# Patient Record
Sex: MALE | Race: WHITE | NOT HISPANIC OR LATINO | Employment: FULL TIME | URBAN - METROPOLITAN AREA
[De-identification: names, ages, dates, MRNs, and addresses within clinical notes are randomized per-mention and may not be internally consistent; named-entity substitution may affect disease eponyms.]

---

## 2017-08-01 ENCOUNTER — GENERIC CONVERSION - ENCOUNTER (OUTPATIENT)
Dept: OTHER | Facility: OTHER | Age: 27
End: 2017-08-01

## 2017-11-21 ENCOUNTER — GENERIC CONVERSION - ENCOUNTER (OUTPATIENT)
Dept: INTERNAL MEDICINE CLINIC | Facility: CLINIC | Age: 27
End: 2017-11-21

## 2018-01-16 ENCOUNTER — GENERIC CONVERSION - ENCOUNTER (OUTPATIENT)
Dept: OTHER | Facility: OTHER | Age: 28
End: 2018-01-16

## 2018-01-16 ENCOUNTER — GENERIC CONVERSION - ENCOUNTER (OUTPATIENT)
Dept: INTERNAL MEDICINE CLINIC | Facility: CLINIC | Age: 28
End: 2018-01-16

## 2018-11-12 ENCOUNTER — IMMUNIZATION (OUTPATIENT)
Dept: INTERNAL MEDICINE CLINIC | Facility: CLINIC | Age: 28
End: 2018-11-12
Payer: COMMERCIAL

## 2018-11-12 DIAGNOSIS — Z23 ENCOUNTER FOR IMMUNIZATION: ICD-10-CM

## 2018-11-12 PROCEDURE — 90471 IMMUNIZATION ADMIN: CPT | Performed by: INTERNAL MEDICINE

## 2018-11-12 PROCEDURE — 90682 RIV4 VACC RECOMBINANT DNA IM: CPT | Performed by: INTERNAL MEDICINE

## 2019-01-01 ENCOUNTER — OFFICE VISIT (OUTPATIENT)
Dept: INTERNAL MEDICINE CLINIC | Facility: CLINIC | Age: 29
End: 2019-01-01
Payer: COMMERCIAL

## 2019-01-01 ENCOUNTER — APPOINTMENT (EMERGENCY)
Dept: CT IMAGING | Facility: HOSPITAL | Age: 29
End: 2019-01-01
Payer: COMMERCIAL

## 2019-01-01 ENCOUNTER — HOSPITAL ENCOUNTER (INPATIENT)
Facility: HOSPITAL | Age: 29
LOS: 5 days | DRG: 441 | End: 2020-01-03
Attending: EMERGENCY MEDICINE | Admitting: EMERGENCY MEDICINE
Payer: COMMERCIAL

## 2019-01-01 ENCOUNTER — APPOINTMENT (INPATIENT)
Dept: RADIOLOGY | Facility: HOSPITAL | Age: 29
DRG: 441 | End: 2019-01-01
Payer: COMMERCIAL

## 2019-01-01 ENCOUNTER — HOSPITAL ENCOUNTER (EMERGENCY)
Facility: HOSPITAL | Age: 29
End: 2019-12-29
Attending: EMERGENCY MEDICINE
Payer: COMMERCIAL

## 2019-01-01 VITALS
DIASTOLIC BLOOD PRESSURE: 61 MMHG | TEMPERATURE: 94.6 F | WEIGHT: 210.1 LBS | RESPIRATION RATE: 22 BRPM | BODY MASS INDEX: 24.59 KG/M2 | HEART RATE: 106 BPM | OXYGEN SATURATION: 98 % | SYSTOLIC BLOOD PRESSURE: 102 MMHG

## 2019-01-01 VITALS
TEMPERATURE: 98.4 F | DIASTOLIC BLOOD PRESSURE: 81 MMHG | RESPIRATION RATE: 14 BRPM | WEIGHT: 207 LBS | BODY MASS INDEX: 23.95 KG/M2 | HEART RATE: 69 BPM | HEIGHT: 78 IN | SYSTOLIC BLOOD PRESSURE: 122 MMHG

## 2019-01-01 DIAGNOSIS — K76.7 HEPATORENAL SYNDROME (HCC): ICD-10-CM

## 2019-01-01 DIAGNOSIS — K56.609 SMALL BOWEL OBSTRUCTION (HCC): ICD-10-CM

## 2019-01-01 DIAGNOSIS — R79.1 ABNORMAL INTERNATIONAL NORMAL RATIO (INR): ICD-10-CM

## 2019-01-01 DIAGNOSIS — K72.90 HEPATIC ENCEPHALOPATHY (HCC): ICD-10-CM

## 2019-01-01 DIAGNOSIS — C22.2 HEPATOBLASTOMA (HCC): ICD-10-CM

## 2019-01-01 DIAGNOSIS — A41.9 SEPTIC SHOCK (HCC): ICD-10-CM

## 2019-01-01 DIAGNOSIS — R78.81 ENTEROCOCCAL BACTEREMIA: ICD-10-CM

## 2019-01-01 DIAGNOSIS — E87.5 ACUTE HYPERKALEMIA: Primary | ICD-10-CM

## 2019-01-01 DIAGNOSIS — R65.21 SEPTIC SHOCK (HCC): ICD-10-CM

## 2019-01-01 DIAGNOSIS — B95.2 ENTEROCOCCAL BACTEREMIA: ICD-10-CM

## 2019-01-01 DIAGNOSIS — N17.9 ACUTE RENAL FAILURE (HCC): Primary | ICD-10-CM

## 2019-01-01 DIAGNOSIS — R57.9 SHOCK (HCC): ICD-10-CM

## 2019-01-01 DIAGNOSIS — E72.20 HYPERAMMONEMIA (HCC): ICD-10-CM

## 2019-01-01 DIAGNOSIS — D72.829 LEUKOCYTOSIS, UNSPECIFIED TYPE: ICD-10-CM

## 2019-01-01 DIAGNOSIS — E83.39 HYPERPHOSPHATEMIA: ICD-10-CM

## 2019-01-01 DIAGNOSIS — C22.2 HEPATOBLASTOMA (HCC): Primary | ICD-10-CM

## 2019-01-01 DIAGNOSIS — C22.0 HEPATOCELLULAR CARCINOMA (HCC): ICD-10-CM

## 2019-01-01 DIAGNOSIS — N17.9 RENAL FAILURE, ACUTE (HCC): ICD-10-CM

## 2019-01-01 LAB
ABO GROUP BLD: NORMAL
ALBUMIN SERPL BCP-MCNC: 1.8 G/DL (ref 3.5–5)
ALBUMIN SERPL BCP-MCNC: 1.9 G/DL (ref 3.5–5)
ALBUMIN SERPL BCP-MCNC: 2 G/DL (ref 3.5–5)
ALBUMIN SERPL BCP-MCNC: 2.1 G/DL (ref 3.5–5)
ALP SERPL-CCNC: 1151 U/L (ref 46–116)
ALP SERPL-CCNC: 1205 U/L (ref 46–116)
ALP SERPL-CCNC: 1411 U/L (ref 46–116)
ALP SERPL-CCNC: 1521 U/L (ref 46–116)
ALT SERPL W P-5'-P-CCNC: 1614 U/L (ref 12–78)
ALT SERPL W P-5'-P-CCNC: 2005 U/L (ref 12–78)
ALT SERPL W P-5'-P-CCNC: 2246 U/L (ref 12–78)
ALT SERPL W P-5'-P-CCNC: 2338 U/L (ref 12–78)
AMMONIA PLAS-SCNC: 164 UMOL/L (ref 11–35)
AMMONIA PLAS-SCNC: 186 UMOL/L (ref 11–35)
AMMONIA PLAS-SCNC: 70 UMOL/L (ref 11–35)
ANION GAP BLD CALC-SCNC: 22 MMOL/L (ref 4–13)
ANION GAP BLD CALC-SCNC: 24 MMOL/L (ref 4–13)
ANION GAP SERPL CALCULATED.3IONS-SCNC: 10 MMOL/L (ref 4–13)
ANION GAP SERPL CALCULATED.3IONS-SCNC: 11 MMOL/L (ref 4–13)
ANION GAP SERPL CALCULATED.3IONS-SCNC: 12 MMOL/L (ref 4–13)
ANION GAP SERPL CALCULATED.3IONS-SCNC: 13 MMOL/L (ref 4–13)
ANION GAP SERPL CALCULATED.3IONS-SCNC: 14 MMOL/L (ref 4–13)
ANION GAP SERPL CALCULATED.3IONS-SCNC: 14 MMOL/L (ref 4–13)
ANION GAP SERPL CALCULATED.3IONS-SCNC: 15 MMOL/L (ref 4–13)
ANION GAP SERPL CALCULATED.3IONS-SCNC: 17 MMOL/L (ref 4–13)
ANION GAP SERPL CALCULATED.3IONS-SCNC: 18 MMOL/L (ref 4–13)
ANION GAP SERPL CALCULATED.3IONS-SCNC: 20 MMOL/L (ref 4–13)
ANION GAP SERPL CALCULATED.3IONS-SCNC: 20 MMOL/L (ref 4–13)
ANISOCYTOSIS BLD QL SMEAR: PRESENT
APAP SERPL-MCNC: <2 UG/ML (ref 10–20)
APTT PPP: 83 SECONDS (ref 23–37)
APTT PPP: 85 SECONDS (ref 23–37)
APTT PPP: 87 SECONDS (ref 23–37)
AST SERPL W P-5'-P-CCNC: 5445 U/L (ref 5–45)
AST SERPL W P-5'-P-CCNC: 8433 U/L (ref 5–45)
AST SERPL W P-5'-P-CCNC: ABNORMAL U/L (ref 5–45)
AST SERPL W P-5'-P-CCNC: ABNORMAL U/L (ref 5–45)
ATRIAL RATE: 115 BPM
ATRIAL RATE: 118 BPM
ATRIAL RATE: 124 BPM
BACTERIA BLD CULT: ABNORMAL
BASE EX.OXY STD BLDV CALC-SCNC: 42.6 % (ref 60–80)
BASE EXCESS BLDV CALC-SCNC: -18.7 MMOL/L
BASOPHILS # BLD MANUAL: 0 THOUSAND/UL (ref 0–0.1)
BASOPHILS # BLD MANUAL: 0.44 THOUSAND/UL (ref 0–0.1)
BASOPHILS NFR MAR MANUAL: 0 % (ref 0–1)
BASOPHILS NFR MAR MANUAL: 1 % (ref 0–1)
BILIRUB DIRECT SERPL-MCNC: 23.18 MG/DL (ref 0–0.2)
BILIRUB SERPL-MCNC: 26.05 MG/DL (ref 0.2–1)
BILIRUB SERPL-MCNC: 26.74 MG/DL (ref 0.2–1)
BILIRUB SERPL-MCNC: 26.85 MG/DL (ref 0.2–1)
BILIRUB SERPL-MCNC: 32.51 MG/DL (ref 0.2–1)
BLD GP AB SCN SERPL QL: NEGATIVE
BLD SMEAR INTERP: NORMAL
BUN BLD-MCNC: 52 MG/DL (ref 5–25)
BUN BLD-MCNC: 55 MG/DL (ref 5–25)
BUN SERPL-MCNC: 19 MG/DL (ref 5–25)
BUN SERPL-MCNC: 20 MG/DL (ref 5–25)
BUN SERPL-MCNC: 21 MG/DL (ref 5–25)
BUN SERPL-MCNC: 24 MG/DL (ref 5–25)
BUN SERPL-MCNC: 25 MG/DL (ref 5–25)
BUN SERPL-MCNC: 30 MG/DL (ref 5–25)
BUN SERPL-MCNC: 32 MG/DL (ref 5–25)
BUN SERPL-MCNC: 38 MG/DL (ref 5–25)
BUN SERPL-MCNC: 49 MG/DL (ref 5–25)
BUN SERPL-MCNC: 53 MG/DL (ref 5–25)
CA-I BLD-SCNC: 0.71 MMOL/L (ref 1.12–1.32)
CA-I BLD-SCNC: 0.72 MMOL/L (ref 1.12–1.32)
CA-I BLD-SCNC: 0.75 MMOL/L (ref 1.12–1.32)
CA-I BLD-SCNC: 0.8 MMOL/L (ref 1.12–1.32)
CA-I BLD-SCNC: 0.87 MMOL/L (ref 1.12–1.32)
CA-I BLD-SCNC: 0.94 MMOL/L (ref 1.12–1.32)
CA-I BLD-SCNC: 0.99 MMOL/L (ref 1.12–1.32)
CA-I BLD-SCNC: 1.05 MMOL/L (ref 1.12–1.32)
CA-I BLD-SCNC: 1.06 MMOL/L (ref 1.12–1.32)
CA-I BLD-SCNC: 1.07 MMOL/L (ref 1.12–1.32)
CA-I BLD-SCNC: 1.08 MMOL/L (ref 1.12–1.32)
CA-I BLD-SCNC: 1.09 MMOL/L (ref 1.12–1.32)
CA-I BLD-SCNC: 1.1 MMOL/L (ref 1.12–1.32)
CA-I BLD-SCNC: 1.1 MMOL/L (ref 1.12–1.32)
CALCIUM SERPL-MCNC: 6.7 MG/DL (ref 8.3–10.1)
CALCIUM SERPL-MCNC: 6.9 MG/DL (ref 8.3–10.1)
CALCIUM SERPL-MCNC: 7.5 MG/DL (ref 8.3–10.1)
CALCIUM SERPL-MCNC: 7.7 MG/DL (ref 8.3–10.1)
CALCIUM SERPL-MCNC: 8.2 MG/DL (ref 8.3–10.1)
CALCIUM SERPL-MCNC: 8.4 MG/DL (ref 8.3–10.1)
CALCIUM SERPL-MCNC: 8.5 MG/DL (ref 8.3–10.1)
CALCIUM SERPL-MCNC: 8.6 MG/DL (ref 8.3–10.1)
CALCIUM SERPL-MCNC: 8.6 MG/DL (ref 8.3–10.1)
CHLORIDE BLD-SCNC: 102 MMOL/L (ref 100–108)
CHLORIDE BLD-SCNC: 94 MMOL/L (ref 100–108)
CHLORIDE SERPL-SCNC: 100 MMOL/L (ref 100–108)
CHLORIDE SERPL-SCNC: 102 MMOL/L (ref 100–108)
CHLORIDE SERPL-SCNC: 105 MMOL/L (ref 100–108)
CHLORIDE SERPL-SCNC: 106 MMOL/L (ref 100–108)
CHLORIDE SERPL-SCNC: 109 MMOL/L (ref 100–108)
CHLORIDE SERPL-SCNC: 110 MMOL/L (ref 100–108)
CHLORIDE SERPL-SCNC: 110 MMOL/L (ref 100–108)
CHLORIDE SERPL-SCNC: 111 MMOL/L (ref 100–108)
CHLORIDE SERPL-SCNC: 94 MMOL/L (ref 100–108)
CHLORIDE SERPL-SCNC: 97 MMOL/L (ref 100–108)
CO2 SERPL-SCNC: 12 MMOL/L (ref 21–32)
CO2 SERPL-SCNC: 12 MMOL/L (ref 21–32)
CO2 SERPL-SCNC: 14 MMOL/L (ref 21–32)
CO2 SERPL-SCNC: 15 MMOL/L (ref 21–32)
CO2 SERPL-SCNC: 17 MMOL/L (ref 21–32)
CO2 SERPL-SCNC: 18 MMOL/L (ref 21–32)
CO2 SERPL-SCNC: 19 MMOL/L (ref 21–32)
CO2 SERPL-SCNC: 19 MMOL/L (ref 21–32)
CO2 SERPL-SCNC: 20 MMOL/L (ref 21–32)
CREAT BLD-MCNC: 4.6 MG/DL (ref 0.6–1.3)
CREAT BLD-MCNC: 5 MG/DL (ref 0.6–1.3)
CREAT SERPL-MCNC: 2.39 MG/DL (ref 0.6–1.3)
CREAT SERPL-MCNC: 2.41 MG/DL (ref 0.6–1.3)
CREAT SERPL-MCNC: 2.53 MG/DL (ref 0.6–1.3)
CREAT SERPL-MCNC: 2.59 MG/DL (ref 0.6–1.3)
CREAT SERPL-MCNC: 2.62 MG/DL (ref 0.6–1.3)
CREAT SERPL-MCNC: 2.71 MG/DL (ref 0.6–1.3)
CREAT SERPL-MCNC: 2.83 MG/DL (ref 0.6–1.3)
CREAT SERPL-MCNC: 2.98 MG/DL (ref 0.6–1.3)
CREAT SERPL-MCNC: 2.98 MG/DL (ref 0.6–1.3)
CREAT SERPL-MCNC: 3.04 MG/DL (ref 0.6–1.3)
CREAT SERPL-MCNC: 3.24 MG/DL (ref 0.6–1.3)
CREAT SERPL-MCNC: 3.58 MG/DL (ref 0.6–1.3)
CREAT SERPL-MCNC: 4.07 MG/DL (ref 0.6–1.3)
CREAT SERPL-MCNC: 4.56 MG/DL (ref 0.6–1.3)
D DIMER PPP FEU-MCNC: >10 UG/ML FEU
DEPRECATED AT III PPP: 36 % OF NORMAL (ref 92–136)
EOSINOPHIL # BLD MANUAL: 0 THOUSAND/UL (ref 0–0.4)
EOSINOPHIL # BLD MANUAL: 0.55 THOUSAND/UL (ref 0–0.4)
EOSINOPHIL NFR BLD MANUAL: 0 % (ref 0–6)
EOSINOPHIL NFR BLD MANUAL: 1 % (ref 0–6)
ERYTHROCYTE [DISTWIDTH] IN BLOOD BY AUTOMATED COUNT: 21.4 % (ref 11.6–15.1)
ERYTHROCYTE [DISTWIDTH] IN BLOOD BY AUTOMATED COUNT: 21.8 % (ref 11.6–15.1)
ERYTHROCYTE [DISTWIDTH] IN BLOOD BY AUTOMATED COUNT: 21.8 % (ref 11.6–15.1)
ERYTHROCYTE [DISTWIDTH] IN BLOOD BY AUTOMATED COUNT: 22 % (ref 11.6–15.1)
ETHANOL SERPL-MCNC: <3 MG/DL (ref 0–3)
FDP BLD QL AGGL: >20 <40
FIBRINOGEN PPP-MCNC: 282 MG/DL (ref 227–495)
FIBRINOGEN PPP-MCNC: 292 MG/DL (ref 227–495)
GFR SERPL CREATININE-BSD FRML MDRD: 14 ML/MIN/1.73SQ M
GFR SERPL CREATININE-BSD FRML MDRD: 16 ML/MIN/1.73SQ M
GFR SERPL CREATININE-BSD FRML MDRD: 16 ML/MIN/1.73SQ M
GFR SERPL CREATININE-BSD FRML MDRD: 19 ML/MIN/1.73SQ M
GFR SERPL CREATININE-BSD FRML MDRD: 22 ML/MIN/1.73SQ M
GFR SERPL CREATININE-BSD FRML MDRD: 24 ML/MIN/1.73SQ M
GFR SERPL CREATININE-BSD FRML MDRD: 26 ML/MIN/1.73SQ M
GFR SERPL CREATININE-BSD FRML MDRD: 27 ML/MIN/1.73SQ M
GFR SERPL CREATININE-BSD FRML MDRD: 27 ML/MIN/1.73SQ M
GFR SERPL CREATININE-BSD FRML MDRD: 29 ML/MIN/1.73SQ M
GFR SERPL CREATININE-BSD FRML MDRD: 30 ML/MIN/1.73SQ M
GFR SERPL CREATININE-BSD FRML MDRD: 32 ML/MIN/1.73SQ M
GFR SERPL CREATININE-BSD FRML MDRD: 32 ML/MIN/1.73SQ M
GFR SERPL CREATININE-BSD FRML MDRD: 33 ML/MIN/1.73SQ M
GFR SERPL CREATININE-BSD FRML MDRD: 35 ML/MIN/1.73SQ M
GFR SERPL CREATININE-BSD FRML MDRD: 35 ML/MIN/1.73SQ M
GLUCOSE SERPL-MCNC: 100 MG/DL (ref 65–140)
GLUCOSE SERPL-MCNC: 102 MG/DL (ref 65–140)
GLUCOSE SERPL-MCNC: 104 MG/DL (ref 65–140)
GLUCOSE SERPL-MCNC: 105 MG/DL (ref 65–140)
GLUCOSE SERPL-MCNC: 105 MG/DL (ref 65–140)
GLUCOSE SERPL-MCNC: 106 MG/DL (ref 65–140)
GLUCOSE SERPL-MCNC: 106 MG/DL (ref 65–140)
GLUCOSE SERPL-MCNC: 107 MG/DL (ref 65–140)
GLUCOSE SERPL-MCNC: 107 MG/DL (ref 65–140)
GLUCOSE SERPL-MCNC: 108 MG/DL (ref 65–140)
GLUCOSE SERPL-MCNC: 111 MG/DL (ref 65–140)
GLUCOSE SERPL-MCNC: 114 MG/DL (ref 65–140)
GLUCOSE SERPL-MCNC: 118 MG/DL (ref 65–140)
GLUCOSE SERPL-MCNC: 118 MG/DL (ref 65–140)
GLUCOSE SERPL-MCNC: 120 MG/DL (ref 65–140)
GLUCOSE SERPL-MCNC: 120 MG/DL (ref 65–140)
GLUCOSE SERPL-MCNC: 123 MG/DL (ref 65–140)
GLUCOSE SERPL-MCNC: 124 MG/DL (ref 65–140)
GLUCOSE SERPL-MCNC: 124 MG/DL (ref 65–140)
GLUCOSE SERPL-MCNC: 127 MG/DL (ref 65–140)
GLUCOSE SERPL-MCNC: 130 MG/DL (ref 65–140)
GLUCOSE SERPL-MCNC: 133 MG/DL (ref 65–140)
GLUCOSE SERPL-MCNC: 246 MG/DL (ref 65–140)
GLUCOSE SERPL-MCNC: 39 MG/DL (ref 65–140)
GLUCOSE SERPL-MCNC: 40 MG/DL (ref 65–140)
GLUCOSE SERPL-MCNC: 59 MG/DL (ref 65–140)
GLUCOSE SERPL-MCNC: 61 MG/DL (ref 65–140)
GLUCOSE SERPL-MCNC: 71 MG/DL (ref 65–140)
GLUCOSE SERPL-MCNC: 71 MG/DL (ref 65–140)
GLUCOSE SERPL-MCNC: 73 MG/DL (ref 65–140)
GLUCOSE SERPL-MCNC: 74 MG/DL (ref 65–140)
GLUCOSE SERPL-MCNC: 75 MG/DL (ref 65–140)
GLUCOSE SERPL-MCNC: 76 MG/DL (ref 65–140)
GLUCOSE SERPL-MCNC: 77 MG/DL (ref 65–140)
GLUCOSE SERPL-MCNC: 77 MG/DL (ref 65–140)
GLUCOSE SERPL-MCNC: 78 MG/DL (ref 65–140)
GLUCOSE SERPL-MCNC: 78 MG/DL (ref 65–140)
GLUCOSE SERPL-MCNC: 79 MG/DL (ref 65–140)
GLUCOSE SERPL-MCNC: 80 MG/DL (ref 65–140)
GLUCOSE SERPL-MCNC: 82 MG/DL (ref 65–140)
GLUCOSE SERPL-MCNC: 83 MG/DL (ref 65–140)
GLUCOSE SERPL-MCNC: 86 MG/DL (ref 65–140)
GLUCOSE SERPL-MCNC: 88 MG/DL (ref 65–140)
GLUCOSE SERPL-MCNC: 90 MG/DL (ref 65–140)
GLUCOSE SERPL-MCNC: 90 MG/DL (ref 65–140)
GLUCOSE SERPL-MCNC: 92 MG/DL (ref 65–140)
GLUCOSE SERPL-MCNC: 93 MG/DL (ref 65–140)
GLUCOSE SERPL-MCNC: 94 MG/DL (ref 65–140)
GLUCOSE SERPL-MCNC: 94 MG/DL (ref 65–140)
GLUCOSE SERPL-MCNC: 95 MG/DL (ref 65–140)
GLUCOSE SERPL-MCNC: 96 MG/DL (ref 65–140)
GLUCOSE SERPL-MCNC: 97 MG/DL (ref 65–140)
GLUCOSE SERPL-MCNC: 97 MG/DL (ref 65–140)
GRAM STN SPEC: ABNORMAL
HCO3 BLDV-SCNC: 8.9 MMOL/L (ref 24–30)
HCT VFR BLD AUTO: 23.5 % (ref 36.5–49.3)
HCT VFR BLD AUTO: 23.8 % (ref 36.5–49.3)
HCT VFR BLD AUTO: 25.2 % (ref 36.5–49.3)
HCT VFR BLD AUTO: 26.2 % (ref 36.5–49.3)
HCT VFR BLD AUTO: 32.6 % (ref 36.5–49.3)
HCT VFR BLD CALC: 27 % (ref 36.5–49.3)
HCT VFR BLD CALC: 35 % (ref 36.5–49.3)
HGB BLD-MCNC: 10.2 G/DL (ref 12–17)
HGB BLD-MCNC: 8.1 G/DL (ref 12–17)
HGB BLD-MCNC: 8.1 G/DL (ref 12–17)
HGB BLD-MCNC: 8.6 G/DL (ref 12–17)
HGB BLD-MCNC: 8.6 G/DL (ref 12–17)
HGB BLDA-MCNC: 11.9 G/DL (ref 12–17)
HGB BLDA-MCNC: 9.2 G/DL (ref 12–17)
INR PPP: 10.3 (ref 0.84–1.19)
INR PPP: 6.6 (ref 0.84–1.19)
INR PPP: >15 (ref 0.84–1.19)
INR PPP: >15 (ref 0.84–1.19)
LACTATE SERPL-SCNC: 10.1 MMOL/L (ref 0.5–2)
LACTATE SERPL-SCNC: 7.3 MMOL/L (ref 0.5–2)
LACTATE SERPL-SCNC: 7.5 MMOL/L (ref 0.5–2)
LACTATE SERPL-SCNC: 7.5 MMOL/L (ref 0.5–2)
LACTATE SERPL-SCNC: 7.7 MMOL/L (ref 0.5–2)
LACTATE SERPL-SCNC: 8.4 MMOL/L (ref 0.5–2)
LACTATE SERPL-SCNC: 9.4 MMOL/L (ref 0.5–2)
LG PLATELETS BLD QL SMEAR: PRESENT
LIPASE SERPL-CCNC: 514 U/L (ref 73–393)
LYMPHOCYTES # BLD AUTO: 0.92 THOUSAND/UL (ref 0.6–4.47)
LYMPHOCYTES # BLD AUTO: 10 % (ref 14–44)
LYMPHOCYTES # BLD AUTO: 2 % (ref 14–44)
LYMPHOCYTES # BLD AUTO: 2.63 THOUSAND/UL (ref 0.6–4.47)
LYMPHOCYTES # BLD AUTO: 4.17 THOUSAND/UL (ref 0.6–4.47)
LYMPHOCYTES # BLD AUTO: 5.45 THOUSAND/UL (ref 0.6–4.47)
LYMPHOCYTES # BLD AUTO: 6 % (ref 14–44)
LYMPHOCYTES # BLD AUTO: 9 % (ref 14–44)
MAGNESIUM SERPL-MCNC: 1.9 MG/DL (ref 1.6–2.6)
MAGNESIUM SERPL-MCNC: 1.9 MG/DL (ref 1.6–2.6)
MAGNESIUM SERPL-MCNC: 2 MG/DL (ref 1.6–2.6)
MAGNESIUM SERPL-MCNC: 2.1 MG/DL (ref 1.6–2.6)
MAGNESIUM SERPL-MCNC: 2.3 MG/DL (ref 1.6–2.6)
MAGNESIUM SERPL-MCNC: 2.5 MG/DL (ref 1.6–2.6)
MCH RBC QN AUTO: 26.9 PG (ref 26.8–34.3)
MCH RBC QN AUTO: 27.1 PG (ref 26.8–34.3)
MCH RBC QN AUTO: 27.3 PG (ref 26.8–34.3)
MCH RBC QN AUTO: 27.6 PG (ref 26.8–34.3)
MCHC RBC AUTO-ENTMCNC: 31.3 G/DL (ref 31.4–37.4)
MCHC RBC AUTO-ENTMCNC: 32.8 G/DL (ref 31.4–37.4)
MCHC RBC AUTO-ENTMCNC: 34 G/DL (ref 31.4–37.4)
MCHC RBC AUTO-ENTMCNC: 34.5 G/DL (ref 31.4–37.4)
MCV RBC AUTO: 79 FL (ref 82–98)
MCV RBC AUTO: 81 FL (ref 82–98)
MCV RBC AUTO: 82 FL (ref 82–98)
MCV RBC AUTO: 87 FL (ref 82–98)
METAMYELOCYTES NFR BLD MANUAL: 2 % (ref 0–1)
METAMYELOCYTES NFR BLD MANUAL: 2 % (ref 0–1)
METAMYELOCYTES NFR BLD MANUAL: 4 % (ref 0–1)
MICROCYTES BLD QL AUTO: PRESENT
MONOCYTES # BLD AUTO: 1.38 THOUSAND/UL (ref 0–1.22)
MONOCYTES # BLD AUTO: 3.07 THOUSAND/UL (ref 0–1.22)
MONOCYTES # BLD AUTO: 3.27 THOUSAND/UL (ref 0–1.22)
MONOCYTES # BLD AUTO: 4.17 THOUSAND/UL (ref 0–1.22)
MONOCYTES NFR BLD: 3 % (ref 4–12)
MONOCYTES NFR BLD: 6 % (ref 4–12)
MONOCYTES NFR BLD: 7 % (ref 4–12)
MONOCYTES NFR BLD: 9 % (ref 4–12)
MYELOCYTES NFR BLD MANUAL: 1 % (ref 0–1)
MYELOCYTES NFR BLD MANUAL: 2 % (ref 0–1)
MYELOCYTES NFR BLD MANUAL: 5 % (ref 0–1)
NEUTROPHILS # BLD MANUAL: 35.54 THOUSAND/UL (ref 1.85–7.62)
NEUTROPHILS # BLD MANUAL: 36.16 THOUSAND/UL (ref 1.85–7.62)
NEUTROPHILS # BLD MANUAL: 42.3 THOUSAND/UL (ref 1.85–7.62)
NEUTROPHILS # BLD MANUAL: 42.54 THOUSAND/UL (ref 1.85–7.62)
NEUTS BAND NFR BLD MANUAL: 10 % (ref 0–8)
NEUTS SEG NFR BLD AUTO: 71 % (ref 43–75)
NEUTS SEG NFR BLD AUTO: 78 % (ref 43–75)
NEUTS SEG NFR BLD AUTO: 78 % (ref 43–75)
NEUTS SEG NFR BLD AUTO: 92 % (ref 43–75)
NRBC BLD AUTO-RTO: 0 /100 WBCS
NRBC BLD AUTO-RTO: 1 /100 WBCS
NRBC BLD AUTO-RTO: 2 /100 WBCS
NRBC BLD AUTO-RTO: 4 /100 WBC (ref 0–2)
NRBC BLD AUTO-RTO: 4 /100 WBC (ref 0–2)
NRBC BLD AUTO-RTO: 5 /100 WBCS
O2 CT BLDV-SCNC: 6.9 ML/DL
P AXIS: 10 DEGREES
P AXIS: 15 DEGREES
P AXIS: 270 DEGREES
PCO2 BLD: 12 MMOL/L (ref 21–32)
PCO2 BLD: 12 MMOL/L (ref 21–32)
PCO2 BLDV: 27.2 MM HG (ref 42–50)
PH BLDV: 7.13 [PH] (ref 7.3–7.4)
PHOSPHATE SERPL-MCNC: 1.3 MG/DL (ref 2.7–4.5)
PHOSPHATE SERPL-MCNC: 12.6 MG/DL (ref 2.7–4.5)
PHOSPHATE SERPL-MCNC: 17.3 MG/DL (ref 2.7–4.5)
PHOSPHATE SERPL-MCNC: 2.5 MG/DL (ref 2.7–4.5)
PHOSPHATE SERPL-MCNC: 2.6 MG/DL (ref 2.7–4.5)
PHOSPHATE SERPL-MCNC: 3.3 MG/DL (ref 2.7–4.5)
PHOSPHATE SERPL-MCNC: 3.5 MG/DL (ref 2.7–4.5)
PHOSPHATE SERPL-MCNC: 4.2 MG/DL (ref 2.7–4.5)
PHOSPHATE SERPL-MCNC: 4.9 MG/DL (ref 2.7–4.5)
PHOSPHATE SERPL-MCNC: 5.6 MG/DL (ref 2.7–4.5)
PHOSPHATE SERPL-MCNC: 6.5 MG/DL (ref 2.7–4.5)
PHOSPHATE SERPL-MCNC: 7.2 MG/DL (ref 2.7–4.5)
PLATELET # BLD AUTO: 466 THOUSANDS/UL (ref 149–390)
PLATELET # BLD AUTO: 567 THOUSANDS/UL (ref 149–390)
PLATELET # BLD AUTO: 656 THOUSANDS/UL (ref 149–390)
PLATELET # BLD AUTO: 667 THOUSANDS/UL (ref 149–390)
PLATELET # BLD AUTO: 811 THOUSANDS/UL (ref 149–390)
PLATELET BLD QL SMEAR: ABNORMAL
PMV BLD AUTO: 10.1 FL (ref 8.9–12.7)
PMV BLD AUTO: 9.5 FL (ref 8.9–12.7)
PMV BLD AUTO: 9.6 FL (ref 8.9–12.7)
PMV BLD AUTO: 9.8 FL (ref 8.9–12.7)
PMV BLD AUTO: 9.8 FL (ref 8.9–12.7)
PO2 BLDV: 33.9 MM HG (ref 35–45)
POIKILOCYTOSIS BLD QL SMEAR: PRESENT
POLYCHROMASIA BLD QL SMEAR: PRESENT
POLYCHROMASIA BLD QL SMEAR: PRESENT
POTASSIUM BLD-SCNC: 6.6 MMOL/L (ref 3.5–5.3)
POTASSIUM BLD-SCNC: 7.5 MMOL/L (ref 3.5–5.3)
POTASSIUM SERPL-SCNC: 3.7 MMOL/L (ref 3.5–5.3)
POTASSIUM SERPL-SCNC: 3.9 MMOL/L (ref 3.5–5.3)
POTASSIUM SERPL-SCNC: 4 MMOL/L (ref 3.5–5.3)
POTASSIUM SERPL-SCNC: 4.1 MMOL/L (ref 3.5–5.3)
POTASSIUM SERPL-SCNC: 4.1 MMOL/L (ref 3.5–5.3)
POTASSIUM SERPL-SCNC: 4.3 MMOL/L (ref 3.5–5.3)
POTASSIUM SERPL-SCNC: 4.4 MMOL/L (ref 3.5–5.3)
POTASSIUM SERPL-SCNC: 4.6 MMOL/L (ref 3.5–5.3)
POTASSIUM SERPL-SCNC: 4.6 MMOL/L (ref 3.5–5.3)
POTASSIUM SERPL-SCNC: 5.2 MMOL/L (ref 3.5–5.3)
POTASSIUM SERPL-SCNC: 6.2 MMOL/L (ref 3.5–5.3)
POTASSIUM SERPL-SCNC: 6.7 MMOL/L (ref 3.5–5.3)
PR INTERVAL: 232 MS
PR INTERVAL: 254 MS
PROCALCITONIN SERPL-MCNC: 36.01 NG/ML
PROT SERPL-MCNC: 4.6 G/DL (ref 6.4–8.2)
PROT SERPL-MCNC: 4.6 G/DL (ref 6.4–8.2)
PROT SERPL-MCNC: 5.1 G/DL (ref 6.4–8.2)
PROT SERPL-MCNC: 5.8 G/DL (ref 6.4–8.2)
PROTHROMBIN TIME: 57.2 SECONDS (ref 11.6–14.5)
PROTHROMBIN TIME: 81.5 SECONDS (ref 11.6–14.5)
PROTHROMBIN TIME: >120 SECONDS (ref 11.6–14.5)
QRS AXIS: -26 DEGREES
QRS AXIS: 125 DEGREES
QRS AXIS: 137 DEGREES
QRSD INTERVAL: 129 MS
QRSD INTERVAL: 134 MS
QRSD INTERVAL: 172 MS
QT INTERVAL: 290 MS
QT INTERVAL: 292 MS
QT INTERVAL: 304 MS
QTC INTERVAL: 420 MS
QTC INTERVAL: 426 MS
QTC INTERVAL: 500 MS
RBC # BLD AUTO: 2.94 MILLION/UL (ref 3.88–5.62)
RBC # BLD AUTO: 2.99 MILLION/UL (ref 3.88–5.62)
RBC # BLD AUTO: 3.2 MILLION/UL (ref 3.88–5.62)
RBC # BLD AUTO: 3.74 MILLION/UL (ref 3.88–5.62)
RBC MORPH BLD: PRESENT
RH BLD: POSITIVE
SALICYLATES SERPL-MCNC: <3 MG/DL (ref 3–20)
SCHISTOCYTES BLD QL SMEAR: PRESENT
SODIUM BLD-SCNC: 119 MMOL/L (ref 136–145)
SODIUM BLD-SCNC: 129 MMOL/L (ref 136–145)
SODIUM SERPL-SCNC: 126 MMOL/L (ref 136–145)
SODIUM SERPL-SCNC: 129 MMOL/L (ref 136–145)
SODIUM SERPL-SCNC: 131 MMOL/L (ref 136–145)
SODIUM SERPL-SCNC: 135 MMOL/L (ref 136–145)
SODIUM SERPL-SCNC: 136 MMOL/L (ref 136–145)
SODIUM SERPL-SCNC: 136 MMOL/L (ref 136–145)
SODIUM SERPL-SCNC: 137 MMOL/L (ref 136–145)
SODIUM SERPL-SCNC: 137 MMOL/L (ref 136–145)
SODIUM SERPL-SCNC: 139 MMOL/L (ref 136–145)
SODIUM SERPL-SCNC: 139 MMOL/L (ref 136–145)
SODIUM SERPL-SCNC: 140 MMOL/L (ref 136–145)
SODIUM SERPL-SCNC: 141 MMOL/L (ref 136–145)
SPECIMEN EXPIRATION DATE: NORMAL
SPECIMEN SOURCE: ABNORMAL
SPECIMEN SOURCE: ABNORMAL
T WAVE AXIS: -26 DEGREES
T WAVE AXIS: -59 DEGREES
T WAVE AXIS: 59 DEGREES
TARGETS BLD QL SMEAR: PRESENT
TOTAL CELLS COUNTED SPEC: 100
TPA PPP QL CHRO: 44 % OF NORMAL (ref 77–138)
TROPONIN I SERPL-MCNC: 0.21 NG/ML
TROPONIN I SERPL-MCNC: 0.7 NG/ML
TROPONIN I SERPL-MCNC: 1.14 NG/ML
TROPONIN I SERPL-MCNC: 2.42 NG/ML
TSH SERPL DL<=0.05 MIU/L-ACNC: 0.01 UIU/ML (ref 0.36–3.74)
URATE SERPL-MCNC: 14.6 MG/DL (ref 4.2–8)
URATE SERPL-MCNC: 2.9 MG/DL (ref 4.2–8)
VARIANT LYMPHS # BLD AUTO: 1 %
VENTRICULAR RATE: 118 BPM
VENTRICULAR RATE: 124 BPM
VENTRICULAR RATE: 179 BPM
WBC # BLD AUTO: 43.88 THOUSAND/UL (ref 4.31–10.16)
WBC # BLD AUTO: 45.98 THOUSAND/UL (ref 4.31–10.16)
WBC # BLD AUTO: 46.36 THOUSAND/UL (ref 4.31–10.16)
WBC # BLD AUTO: 54.54 THOUSAND/UL (ref 4.31–10.16)

## 2019-01-01 PROCEDURE — 36415 COLL VENOUS BLD VENIPUNCTURE: CPT | Performed by: STUDENT IN AN ORGANIZED HEALTH CARE EDUCATION/TRAINING PROGRAM

## 2019-01-01 PROCEDURE — 99292 CRITICAL CARE ADDL 30 MIN: CPT | Performed by: EMERGENCY MEDICINE

## 2019-01-01 PROCEDURE — 5A1D90Z PERFORMANCE OF URINARY FILTRATION, CONTINUOUS, GREATER THAN 18 HOURS PER DAY: ICD-10-PCS | Performed by: INTERNAL MEDICINE

## 2019-01-01 PROCEDURE — 90945 DIALYSIS ONE EVALUATION: CPT | Performed by: INTERNAL MEDICINE

## 2019-01-01 PROCEDURE — NC001 PR NO CHARGE: Performed by: SURGERY

## 2019-01-01 PROCEDURE — 85610 PROTHROMBIN TIME: CPT | Performed by: PHYSICIAN ASSISTANT

## 2019-01-01 PROCEDURE — 99285 EMERGENCY DEPT VISIT HI MDM: CPT | Performed by: STUDENT IN AN ORGANIZED HEALTH CARE EDUCATION/TRAINING PROGRAM

## 2019-01-01 PROCEDURE — 85027 COMPLETE CBC AUTOMATED: CPT | Performed by: EMERGENCY MEDICINE

## 2019-01-01 PROCEDURE — 85610 PROTHROMBIN TIME: CPT | Performed by: EMERGENCY MEDICINE

## 2019-01-01 PROCEDURE — 87077 CULTURE AEROBIC IDENTIFY: CPT | Performed by: STUDENT IN AN ORGANIZED HEALTH CARE EDUCATION/TRAINING PROGRAM

## 2019-01-01 PROCEDURE — 96375 TX/PRO/DX INJ NEW DRUG ADDON: CPT

## 2019-01-01 PROCEDURE — 82140 ASSAY OF AMMONIA: CPT | Performed by: PHYSICIAN ASSISTANT

## 2019-01-01 PROCEDURE — 84100 ASSAY OF PHOSPHORUS: CPT | Performed by: INTERNAL MEDICINE

## 2019-01-01 PROCEDURE — 96365 THER/PROPH/DIAG IV INF INIT: CPT

## 2019-01-01 PROCEDURE — 80048 BASIC METABOLIC PNL TOTAL CA: CPT | Performed by: EMERGENCY MEDICINE

## 2019-01-01 PROCEDURE — 99292 CRITICAL CARE ADDL 30 MIN: CPT | Performed by: INTERNAL MEDICINE

## 2019-01-01 PROCEDURE — 82948 REAGENT STRIP/BLOOD GLUCOSE: CPT

## 2019-01-01 PROCEDURE — 90945 DIALYSIS ONE EVALUATION: CPT

## 2019-01-01 PROCEDURE — 99223 1ST HOSP IP/OBS HIGH 75: CPT | Performed by: INTERNAL MEDICINE

## 2019-01-01 PROCEDURE — 93005 ELECTROCARDIOGRAM TRACING: CPT

## 2019-01-01 PROCEDURE — 94640 AIRWAY INHALATION TREATMENT: CPT

## 2019-01-01 PROCEDURE — C9113 INJ PANTOPRAZOLE SODIUM, VIA: HCPCS | Performed by: PHYSICIAN ASSISTANT

## 2019-01-01 PROCEDURE — 85384 FIBRINOGEN ACTIVITY: CPT | Performed by: EMERGENCY MEDICINE

## 2019-01-01 PROCEDURE — 84100 ASSAY OF PHOSPHORUS: CPT | Performed by: EMERGENCY MEDICINE

## 2019-01-01 PROCEDURE — 85027 COMPLETE CBC AUTOMATED: CPT | Performed by: PHYSICIAN ASSISTANT

## 2019-01-01 PROCEDURE — 96367 TX/PROPH/DG ADDL SEQ IV INF: CPT

## 2019-01-01 PROCEDURE — 85014 HEMATOCRIT: CPT

## 2019-01-01 PROCEDURE — 83735 ASSAY OF MAGNESIUM: CPT | Performed by: INTERNAL MEDICINE

## 2019-01-01 PROCEDURE — 3008F BODY MASS INDEX DOCD: CPT | Performed by: INTERNAL MEDICINE

## 2019-01-01 PROCEDURE — 99285 EMERGENCY DEPT VISIT HI MDM: CPT

## 2019-01-01 PROCEDURE — 83605 ASSAY OF LACTIC ACID: CPT | Performed by: EMERGENCY MEDICINE

## 2019-01-01 PROCEDURE — 85018 HEMOGLOBIN: CPT | Performed by: PHYSICIAN ASSISTANT

## 2019-01-01 PROCEDURE — 82330 ASSAY OF CALCIUM: CPT | Performed by: EMERGENCY MEDICINE

## 2019-01-01 PROCEDURE — 93010 ELECTROCARDIOGRAM REPORT: CPT | Performed by: INTERNAL MEDICINE

## 2019-01-01 PROCEDURE — 71250 CT THORAX DX C-: CPT

## 2019-01-01 PROCEDURE — 85007 BL SMEAR W/DIFF WBC COUNT: CPT | Performed by: PHYSICIAN ASSISTANT

## 2019-01-01 PROCEDURE — 99214 OFFICE O/P EST MOD 30 MIN: CPT | Performed by: INTERNAL MEDICINE

## 2019-01-01 PROCEDURE — NC001 PR NO CHARGE: Performed by: EMERGENCY MEDICINE

## 2019-01-01 PROCEDURE — 87186 SC STD MICRODIL/AGAR DIL: CPT | Performed by: INTERNAL MEDICINE

## 2019-01-01 PROCEDURE — 85730 THROMBOPLASTIN TIME PARTIAL: CPT | Performed by: PHYSICIAN ASSISTANT

## 2019-01-01 PROCEDURE — 85007 BL SMEAR W/DIFF WBC COUNT: CPT | Performed by: EMERGENCY MEDICINE

## 2019-01-01 PROCEDURE — 84145 PROCALCITONIN (PCT): CPT | Performed by: PHYSICIAN ASSISTANT

## 2019-01-01 PROCEDURE — 96361 HYDRATE IV INFUSION ADD-ON: CPT

## 2019-01-01 PROCEDURE — 85730 THROMBOPLASTIN TIME PARTIAL: CPT | Performed by: EMERGENCY MEDICINE

## 2019-01-01 PROCEDURE — 87186 SC STD MICRODIL/AGAR DIL: CPT | Performed by: STUDENT IN AN ORGANIZED HEALTH CARE EDUCATION/TRAINING PROGRAM

## 2019-01-01 PROCEDURE — 80076 HEPATIC FUNCTION PANEL: CPT | Performed by: PHYSICIAN ASSISTANT

## 2019-01-01 PROCEDURE — 80047 BASIC METABLC PNL IONIZED CA: CPT

## 2019-01-01 PROCEDURE — 85362 FIBRIN DEGRADATION PRODUCTS: CPT | Performed by: EMERGENCY MEDICINE

## 2019-01-01 PROCEDURE — 80329 ANALGESICS NON-OPIOID 1 OR 2: CPT | Performed by: EMERGENCY MEDICINE

## 2019-01-01 PROCEDURE — 80053 COMPREHEN METABOLIC PANEL: CPT | Performed by: PHYSICIAN ASSISTANT

## 2019-01-01 PROCEDURE — 80053 COMPREHEN METABOLIC PANEL: CPT | Performed by: EMERGENCY MEDICINE

## 2019-01-01 PROCEDURE — 4A133B1 MONITORING OF ARTERIAL PRESSURE, PERIPHERAL, PERCUTANEOUS APPROACH: ICD-10-PCS | Performed by: INTERNAL MEDICINE

## 2019-01-01 PROCEDURE — 82330 ASSAY OF CALCIUM: CPT | Performed by: INTERNAL MEDICINE

## 2019-01-01 PROCEDURE — 1036F TOBACCO NON-USER: CPT | Performed by: INTERNAL MEDICINE

## 2019-01-01 PROCEDURE — 99291 CRITICAL CARE FIRST HOUR: CPT | Performed by: EMERGENCY MEDICINE

## 2019-01-01 PROCEDURE — 83605 ASSAY OF LACTIC ACID: CPT | Performed by: PHYSICIAN ASSISTANT

## 2019-01-01 PROCEDURE — 36556 INSERT NON-TUNNEL CV CATH: CPT | Performed by: EMERGENCY MEDICINE

## 2019-01-01 PROCEDURE — 86900 BLOOD TYPING SEROLOGIC ABO: CPT | Performed by: EMERGENCY MEDICINE

## 2019-01-01 PROCEDURE — 36415 COLL VENOUS BLD VENIPUNCTURE: CPT | Performed by: EMERGENCY MEDICINE

## 2019-01-01 PROCEDURE — NC001 PR NO CHARGE: Performed by: INTERNAL MEDICINE

## 2019-01-01 PROCEDURE — 84443 ASSAY THYROID STIM HORMONE: CPT | Performed by: EMERGENCY MEDICINE

## 2019-01-01 PROCEDURE — 87040 BLOOD CULTURE FOR BACTERIA: CPT | Performed by: INTERNAL MEDICINE

## 2019-01-01 PROCEDURE — 83690 ASSAY OF LIPASE: CPT | Performed by: EMERGENCY MEDICINE

## 2019-01-01 PROCEDURE — 80320 DRUG SCREEN QUANTALCOHOLS: CPT | Performed by: EMERGENCY MEDICINE

## 2019-01-01 PROCEDURE — 99285 EMERGENCY DEPT VISIT HI MDM: CPT | Performed by: INTERNAL MEDICINE

## 2019-01-01 PROCEDURE — 85014 HEMATOCRIT: CPT | Performed by: PHYSICIAN ASSISTANT

## 2019-01-01 PROCEDURE — 85049 AUTOMATED PLATELET COUNT: CPT | Performed by: EMERGENCY MEDICINE

## 2019-01-01 PROCEDURE — 84484 ASSAY OF TROPONIN QUANT: CPT | Performed by: EMERGENCY MEDICINE

## 2019-01-01 PROCEDURE — 36620 INSERTION CATHETER ARTERY: CPT | Performed by: EMERGENCY MEDICINE

## 2019-01-01 PROCEDURE — 85420 FIBRINOLYTIC PLASMINOGEN: CPT | Performed by: EMERGENCY MEDICINE

## 2019-01-01 PROCEDURE — 86850 RBC ANTIBODY SCREEN: CPT | Performed by: EMERGENCY MEDICINE

## 2019-01-01 PROCEDURE — 96372 THER/PROPH/DIAG INJ SC/IM: CPT

## 2019-01-01 PROCEDURE — 4A133J1 MONITORING OF ARTERIAL PULSE, PERIPHERAL, PERCUTANEOUS APPROACH: ICD-10-PCS | Performed by: INTERNAL MEDICINE

## 2019-01-01 PROCEDURE — 85379 FIBRIN DEGRADATION QUANT: CPT | Performed by: EMERGENCY MEDICINE

## 2019-01-01 PROCEDURE — 99291 CRITICAL CARE FIRST HOUR: CPT | Performed by: PHYSICIAN ASSISTANT

## 2019-01-01 PROCEDURE — 85384 FIBRINOGEN ACTIVITY: CPT | Performed by: PHYSICIAN ASSISTANT

## 2019-01-01 PROCEDURE — 76700 US EXAM ABDOM COMPLETE: CPT

## 2019-01-01 PROCEDURE — 87040 BLOOD CULTURE FOR BACTERIA: CPT | Performed by: STUDENT IN AN ORGANIZED HEALTH CARE EDUCATION/TRAINING PROGRAM

## 2019-01-01 PROCEDURE — 82140 ASSAY OF AMMONIA: CPT | Performed by: EMERGENCY MEDICINE

## 2019-01-01 PROCEDURE — 80074 ACUTE HEPATITIS PANEL: CPT | Performed by: STUDENT IN AN ORGANIZED HEALTH CARE EDUCATION/TRAINING PROGRAM

## 2019-01-01 PROCEDURE — 84550 ASSAY OF BLOOD/URIC ACID: CPT | Performed by: EMERGENCY MEDICINE

## 2019-01-01 PROCEDURE — 82805 BLOOD GASES W/O2 SATURATION: CPT | Performed by: EMERGENCY MEDICINE

## 2019-01-01 PROCEDURE — 99222 1ST HOSP IP/OBS MODERATE 55: CPT | Performed by: INTERNAL MEDICINE

## 2019-01-01 PROCEDURE — 80048 BASIC METABOLIC PNL TOTAL CA: CPT | Performed by: INTERNAL MEDICINE

## 2019-01-01 PROCEDURE — 87147 CULTURE TYPE IMMUNOLOGIC: CPT | Performed by: STUDENT IN AN ORGANIZED HEALTH CARE EDUCATION/TRAINING PROGRAM

## 2019-01-01 PROCEDURE — 87077 CULTURE AEROBIC IDENTIFY: CPT | Performed by: INTERNAL MEDICINE

## 2019-01-01 PROCEDURE — 70450 CT HEAD/BRAIN W/O DYE: CPT

## 2019-01-01 PROCEDURE — 74176 CT ABD & PELVIS W/O CONTRAST: CPT

## 2019-01-01 PROCEDURE — 03HY32Z INSERTION OF MONITORING DEVICE INTO UPPER ARTERY, PERCUTANEOUS APPROACH: ICD-10-PCS | Performed by: INTERNAL MEDICINE

## 2019-01-01 PROCEDURE — 99254 IP/OBS CNSLTJ NEW/EST MOD 60: CPT | Performed by: SURGERY

## 2019-01-01 PROCEDURE — 05HM33Z INSERTION OF INFUSION DEVICE INTO RIGHT INTERNAL JUGULAR VEIN, PERCUTANEOUS APPROACH: ICD-10-PCS | Performed by: INTERNAL MEDICINE

## 2019-01-01 PROCEDURE — 85300 ANTITHROMBIN III ACTIVITY: CPT | Performed by: EMERGENCY MEDICINE

## 2019-01-01 PROCEDURE — 86901 BLOOD TYPING SEROLOGIC RH(D): CPT | Performed by: EMERGENCY MEDICINE

## 2019-01-01 PROCEDURE — 84550 ASSAY OF BLOOD/URIC ACID: CPT | Performed by: INTERNAL MEDICINE

## 2019-01-01 PROCEDURE — 71045 X-RAY EXAM CHEST 1 VIEW: CPT

## 2019-01-01 PROCEDURE — 83605 ASSAY OF LACTIC ACID: CPT | Performed by: STUDENT IN AN ORGANIZED HEALTH CARE EDUCATION/TRAINING PROGRAM

## 2019-01-01 RX ORDER — FUROSEMIDE 10 MG/ML
20 INJECTION INTRAMUSCULAR; INTRAVENOUS ONCE
Status: DISCONTINUED | OUTPATIENT
Start: 2019-01-01 | End: 2019-01-01 | Stop reason: HOSPADM

## 2019-01-01 RX ORDER — DEXTROSE AND SODIUM CHLORIDE 5; .9 G/100ML; G/100ML
100 INJECTION, SOLUTION INTRAVENOUS CONTINUOUS
Status: DISCONTINUED | OUTPATIENT
Start: 2019-01-01 | End: 2019-01-01

## 2019-01-01 RX ORDER — POTASSIUM CHLORIDE 14.9 MG/ML
20 INJECTION INTRAVENOUS
Status: COMPLETED | OUTPATIENT
Start: 2019-01-01 | End: 2019-01-01

## 2019-01-01 RX ORDER — HALOPERIDOL 5 MG/ML
INJECTION INTRAMUSCULAR
Status: DISCONTINUED
Start: 2019-01-01 | End: 2019-01-01 | Stop reason: WASHOUT

## 2019-01-01 RX ORDER — KETAMINE HCL IN NACL, ISO-OSM 100MG/10ML
SYRINGE (ML) INJECTION
Status: COMPLETED
Start: 2019-01-01 | End: 2019-01-01

## 2019-01-01 RX ORDER — FENTANYL CITRATE 50 UG/ML
50 INJECTION, SOLUTION INTRAMUSCULAR; INTRAVENOUS EVERY 2 HOUR PRN
Status: DISCONTINUED | OUTPATIENT
Start: 2019-01-01 | End: 2019-01-01

## 2019-01-01 RX ORDER — NOREPINEPHRINE BITARTRATE 1 MG/ML
INJECTION, SOLUTION INTRAVENOUS
Status: DISCONTINUED
Start: 2019-01-01 | End: 2019-01-01 | Stop reason: HOSPADM

## 2019-01-01 RX ORDER — CALCIUM GLUCONATE 20 MG/ML
1 INJECTION, SOLUTION INTRAVENOUS ONCE
Status: COMPLETED | OUTPATIENT
Start: 2019-01-01 | End: 2019-01-01

## 2019-01-01 RX ORDER — PHENYLEPHRINE HYDROCHLORIDE 10 MG/ML
INJECTION INTRAVENOUS
Status: COMPLETED
Start: 2019-01-01 | End: 2019-01-01

## 2019-01-01 RX ORDER — CALCIUM GLUCONATE 20 MG/ML
2 INJECTION, SOLUTION INTRAVENOUS ONCE
Status: COMPLETED | OUTPATIENT
Start: 2019-01-01 | End: 2019-01-01

## 2019-01-01 RX ORDER — MAGNESIUM SULFATE 1 G/100ML
1 INJECTION INTRAVENOUS ONCE
Status: COMPLETED | OUTPATIENT
Start: 2019-01-01 | End: 2019-01-01

## 2019-01-01 RX ORDER — DEXTROSE MONOHYDRATE 25 G/50ML
INJECTION, SOLUTION INTRAVENOUS
Status: COMPLETED
Start: 2019-01-01 | End: 2019-01-01

## 2019-01-01 RX ORDER — OLANZAPINE 10 MG/1
2.5 INJECTION, POWDER, LYOPHILIZED, FOR SOLUTION INTRAMUSCULAR ONCE
Status: DISCONTINUED | OUTPATIENT
Start: 2019-01-01 | End: 2019-01-01

## 2019-01-01 RX ORDER — KETAMINE HCL IN NACL, ISO-OSM 100MG/10ML
50 SYRINGE (ML) INJECTION EVERY 2 HOUR PRN
Status: DISCONTINUED | OUTPATIENT
Start: 2019-01-01 | End: 2020-01-01

## 2019-01-01 RX ORDER — FENTANYL CITRATE 50 UG/ML
50 INJECTION, SOLUTION INTRAMUSCULAR; INTRAVENOUS
Status: DISCONTINUED | OUTPATIENT
Start: 2019-01-01 | End: 2020-01-01

## 2019-01-01 RX ORDER — DEXTROSE MONOHYDRATE 25 G/50ML
25 INJECTION, SOLUTION INTRAVENOUS ONCE
Status: COMPLETED | OUTPATIENT
Start: 2019-01-01 | End: 2019-01-01

## 2019-01-01 RX ORDER — DEXTROSE MONOHYDRATE 25 G/50ML
50 INJECTION, SOLUTION INTRAVENOUS ONCE
Status: COMPLETED | OUTPATIENT
Start: 2019-01-01 | End: 2019-01-01

## 2019-01-01 RX ORDER — KETAMINE HCL IN NACL, ISO-OSM 100MG/10ML
20 SYRINGE (ML) INJECTION ONCE
Status: DISCONTINUED | OUTPATIENT
Start: 2019-01-01 | End: 2019-01-01

## 2019-01-01 RX ORDER — POTASSIUM CHLORIDE 29.8 MG/ML
40 INJECTION INTRAVENOUS ONCE
Status: COMPLETED | OUTPATIENT
Start: 2019-01-01 | End: 2019-01-01

## 2019-01-01 RX ORDER — FENTANYL CITRATE-0.9 % NACL/PF 10 MCG/ML
100 PLASTIC BAG, INJECTION (ML) INTRAVENOUS CONTINUOUS
Status: DISCONTINUED | OUTPATIENT
Start: 2019-01-01 | End: 2020-01-01 | Stop reason: HOSPADM

## 2019-01-01 RX ORDER — KETAMINE HCL IN NACL, ISO-OSM 100MG/10ML
20 SYRINGE (ML) INJECTION ONCE
Status: COMPLETED | OUTPATIENT
Start: 2019-01-01 | End: 2019-01-01

## 2019-01-01 RX ORDER — PANTOPRAZOLE SODIUM 40 MG/1
40 INJECTION, POWDER, FOR SOLUTION INTRAVENOUS
Status: DISCONTINUED | OUTPATIENT
Start: 2019-01-01 | End: 2020-01-01

## 2019-01-01 RX ORDER — CHLORHEXIDINE GLUCONATE 0.12 MG/ML
15 RINSE ORAL EVERY 12 HOURS SCHEDULED
Status: DISCONTINUED | OUTPATIENT
Start: 2019-01-01 | End: 2019-01-01

## 2019-01-01 RX ORDER — VANCOMYCIN HYDROCHLORIDE 500 MG/100ML
500 INJECTION, SOLUTION INTRAVENOUS ONCE
Status: DISCONTINUED | OUTPATIENT
Start: 2019-01-01 | End: 2019-01-01

## 2019-01-01 RX ORDER — IBUPROFEN 200 MG
200 TABLET ORAL EVERY 6 HOURS PRN
Start: 2019-01-01 | End: 2020-01-01 | Stop reason: HOSPADM

## 2019-01-01 RX ORDER — HALOPERIDOL 5 MG/ML
2 INJECTION INTRAMUSCULAR ONCE
Status: DISCONTINUED | OUTPATIENT
Start: 2019-01-01 | End: 2019-01-01

## 2019-01-01 RX ORDER — HALOPERIDOL 5 MG/ML
INJECTION INTRAMUSCULAR
Status: DISPENSED
Start: 2019-01-01 | End: 2019-01-01

## 2019-01-01 RX ORDER — ALBUTEROL SULFATE 2.5 MG/3ML
5 SOLUTION RESPIRATORY (INHALATION) ONCE
Status: COMPLETED | OUTPATIENT
Start: 2019-01-01 | End: 2019-01-01

## 2019-01-01 RX ORDER — ALBUMIN, HUMAN INJ 5% 5 %
12.5 SOLUTION INTRAVENOUS ONCE
Status: COMPLETED | OUTPATIENT
Start: 2019-01-01 | End: 2019-01-01

## 2019-01-01 RX ORDER — ALBUMIN, HUMAN INJ 5% 5 %
SOLUTION INTRAVENOUS
Status: COMPLETED
Start: 2019-01-01 | End: 2019-01-01

## 2019-01-01 RX ORDER — EPINEPHRINE 1 MG/ML
INJECTION, SOLUTION, CONCENTRATE INTRAVENOUS
Status: DISPENSED
Start: 2019-01-01 | End: 2020-01-01

## 2019-01-01 RX ORDER — KETAMINE HCL IN NACL, ISO-OSM 100MG/10ML
0.5 SYRINGE (ML) INJECTION EVERY 2 HOUR PRN
Status: DISCONTINUED | OUTPATIENT
Start: 2019-01-01 | End: 2019-01-01

## 2019-01-01 RX ORDER — CALCIUM GLUCONATE 20 MG/ML
1 INJECTION, SOLUTION INTRAVENOUS ONCE
Status: DISCONTINUED | OUTPATIENT
Start: 2019-01-01 | End: 2019-01-01

## 2019-01-01 RX ORDER — MINERAL OIL AND PETROLATUM 150; 830 MG/G; MG/G
OINTMENT OPHTHALMIC 2 TIMES DAILY
Status: DISCONTINUED | OUTPATIENT
Start: 2019-01-01 | End: 2020-01-01 | Stop reason: HOSPADM

## 2019-01-01 RX ORDER — ALBUTEROL SULFATE 2.5 MG/3ML
SOLUTION RESPIRATORY (INHALATION)
Status: COMPLETED
Start: 2019-01-01 | End: 2019-01-01

## 2019-01-01 RX ORDER — FENTANYL CITRATE 50 UG/ML
50 INJECTION, SOLUTION INTRAMUSCULAR; INTRAVENOUS ONCE
Status: COMPLETED | OUTPATIENT
Start: 2019-01-01 | End: 2019-01-01

## 2019-01-01 RX ADMIN — Medication 20000 ML: at 14:15

## 2019-01-01 RX ADMIN — WHITE PETROLATUM 57.7 %-MINERAL OIL 31.9 % EYE OINTMENT: at 18:13

## 2019-01-01 RX ADMIN — Medication 20 MG: at 20:04

## 2019-01-01 RX ADMIN — CALCIUM GLUCONATE 1 G: 20 INJECTION, SOLUTION INTRAVENOUS at 23:15

## 2019-01-01 RX ADMIN — Medication 20000 ML: at 20:53

## 2019-01-01 RX ADMIN — CALCIUM GLUCONATE 1 G: 20 INJECTION, SOLUTION INTRAVENOUS at 13:05

## 2019-01-01 RX ADMIN — DEXMEDETOMIDINE 0.7 MCG/KG/HR: 100 INJECTION, SOLUTION, CONCENTRATE INTRAVENOUS at 13:16

## 2019-01-01 RX ADMIN — NOREPINEPHRINE BITARTRATE 30 MCG/MIN: 1 INJECTION INTRAVENOUS at 03:00

## 2019-01-01 RX ADMIN — DEXTROSE MONOHYDRATE 50 ML: 25 INJECTION, SOLUTION INTRAVENOUS at 22:25

## 2019-01-01 RX ADMIN — Medication 20000 ML: at 18:22

## 2019-01-01 RX ADMIN — PHENYLEPHRINE HYDROCHLORIDE 10 MG: 10 INJECTION INTRAVENOUS at 06:22

## 2019-01-01 RX ADMIN — CEFEPIME HYDROCHLORIDE 2000 MG: 2 INJECTION, POWDER, FOR SOLUTION INTRAVENOUS at 09:02

## 2019-01-01 RX ADMIN — CEFEPIME HYDROCHLORIDE 2000 MG: 2 INJECTION, POWDER, FOR SOLUTION INTRAVENOUS at 22:13

## 2019-01-01 RX ADMIN — VANCOMYCIN HYDROCHLORIDE 2000 MG: 1 INJECTION, POWDER, LYOPHILIZED, FOR SOLUTION INTRAVENOUS at 10:53

## 2019-01-01 RX ADMIN — INSULIN HUMAN 10 UNITS: 100 INJECTION, SOLUTION PARENTERAL at 21:52

## 2019-01-01 RX ADMIN — DEXTROSE 50 % IN WATER (D50W) INTRAVENOUS SYRINGE 50 ML: at 22:25

## 2019-01-01 RX ADMIN — VASOPRESSIN 0.04 UNITS/MIN: 20 INJECTION INTRAVENOUS at 10:09

## 2019-01-01 RX ADMIN — METRONIDAZOLE 500 MG: 500 INJECTION, SOLUTION INTRAVENOUS at 00:28

## 2019-01-01 RX ADMIN — CALCIUM GLUCONATE 1 G: 20 INJECTION, SOLUTION INTRAVENOUS at 21:57

## 2019-01-01 RX ADMIN — PANTOPRAZOLE SODIUM 40 MG: 40 INJECTION, POWDER, FOR SOLUTION INTRAVENOUS at 09:44

## 2019-01-01 RX ADMIN — HYDROCORTISONE SODIUM SUCCINATE 50 MG: 100 INJECTION, POWDER, FOR SOLUTION INTRAMUSCULAR; INTRAVENOUS at 15:10

## 2019-01-01 RX ADMIN — METRONIDAZOLE 500 MG: 500 INJECTION, SOLUTION INTRAVENOUS at 17:43

## 2019-01-01 RX ADMIN — HYDROCORTISONE SODIUM SUCCINATE 50 MG: 100 INJECTION, POWDER, FOR SOLUTION INTRAMUSCULAR; INTRAVENOUS at 08:57

## 2019-01-01 RX ADMIN — PHENYLEPHRINE HYDROCHLORIDE 180 MCG/MIN: 10 INJECTION INTRAVENOUS at 18:34

## 2019-01-01 RX ADMIN — Medication 48 MG: at 06:05

## 2019-01-01 RX ADMIN — DEXMEDETOMIDINE 0.2 MCG/KG/HR: 100 INJECTION, SOLUTION, CONCENTRATE INTRAVENOUS at 03:38

## 2019-01-01 RX ADMIN — HYDROCORTISONE SODIUM SUCCINATE 50 MG: 100 INJECTION, POWDER, FOR SOLUTION INTRAMUSCULAR; INTRAVENOUS at 08:33

## 2019-01-01 RX ADMIN — Medication 20 MG: at 22:02

## 2019-01-01 RX ADMIN — PANTOPRAZOLE SODIUM 40 MG: 40 INJECTION, POWDER, FOR SOLUTION INTRAVENOUS at 08:31

## 2019-01-01 RX ADMIN — KETAMINE HYDROCHLORIDE 0.2 MG/KG/HR: 50 INJECTION, SOLUTION INTRAMUSCULAR; INTRAVENOUS at 13:09

## 2019-01-01 RX ADMIN — CALCIUM GLUCONATE 3 G: 98 INJECTION, SOLUTION INTRAVENOUS at 13:37

## 2019-01-01 RX ADMIN — CALCIUM GLUCONATE 3 G: 98 INJECTION, SOLUTION INTRAVENOUS at 20:30

## 2019-01-01 RX ADMIN — FENTANYL CITRATE 50 MCG: 50 INJECTION, SOLUTION INTRAMUSCULAR; INTRAVENOUS at 23:04

## 2019-01-01 RX ADMIN — ASCORBIC ACID 1500 MG: 500 INJECTION, SOLUTION INTRAMUSCULAR; INTRAVENOUS; SUBCUTANEOUS at 04:59

## 2019-01-01 RX ADMIN — PHENYLEPHRINE HYDROCHLORIDE 150 MCG/MIN: 10 INJECTION INTRAVENOUS at 00:38

## 2019-01-01 RX ADMIN — NOREPINEPHRINE BITARTRATE 30 MCG/MIN: 1 INJECTION INTRAVENOUS at 00:36

## 2019-01-01 RX ADMIN — VANCOMYCIN HYDROCHLORIDE 1500 MG: 1 INJECTION, POWDER, LYOPHILIZED, FOR SOLUTION INTRAVENOUS at 23:14

## 2019-01-01 RX ADMIN — VASOPRESSIN 0.04 UNITS/MIN: 20 INJECTION INTRAVENOUS at 17:49

## 2019-01-01 RX ADMIN — CALCIUM GLUCONATE 2 G: 20 INJECTION, SOLUTION INTRAVENOUS at 17:58

## 2019-01-01 RX ADMIN — CALCIUM GLUCONATE 2 G: 20 INJECTION, SOLUTION INTRAVENOUS at 12:56

## 2019-01-01 RX ADMIN — NOREPINEPHRINE BITARTRATE 30 MCG/MIN: 1 INJECTION INTRAVENOUS at 14:53

## 2019-01-01 RX ADMIN — Medication 20000 ML: at 11:52

## 2019-01-01 RX ADMIN — SODIUM CHLORIDE 100 ML/HR: 234 INJECTION INTRAMUSCULAR; INTRAVENOUS; SUBCUTANEOUS at 16:13

## 2019-01-01 RX ADMIN — CEFEPIME HYDROCHLORIDE 2000 MG: 2 INJECTION, POWDER, FOR SOLUTION INTRAVENOUS at 22:48

## 2019-01-01 RX ADMIN — METRONIDAZOLE 500 MG: 500 INJECTION, SOLUTION INTRAVENOUS at 09:23

## 2019-01-01 RX ADMIN — SODIUM PHOSPHATE, MONOBASIC, MONOHYDRATE 12 MMOL: 276; 142 INJECTION, SOLUTION INTRAVENOUS at 08:13

## 2019-01-01 RX ADMIN — HYDROCORTISONE SODIUM SUCCINATE 50 MG: 100 INJECTION, POWDER, FOR SOLUTION INTRAMUSCULAR; INTRAVENOUS at 20:30

## 2019-01-01 RX ADMIN — PHYTONADIONE 10 MG: 10 INJECTION, EMULSION INTRAMUSCULAR; INTRAVENOUS; SUBCUTANEOUS at 08:42

## 2019-01-01 RX ADMIN — PHENYLEPHRINE HYDROCHLORIDE 120 MCG/MIN: 10 INJECTION INTRAVENOUS at 12:49

## 2019-01-01 RX ADMIN — DEXMEDETOMIDINE 0.6 MCG/KG/HR: 100 INJECTION, SOLUTION, CONCENTRATE INTRAVENOUS at 20:51

## 2019-01-01 RX ADMIN — Medication 20000 ML: at 02:40

## 2019-01-01 RX ADMIN — VASOPRESSIN 0.04 UNITS/MIN: 20 INJECTION INTRAVENOUS at 13:15

## 2019-01-01 RX ADMIN — EPINEPHRINE: 0.1 INJECTION, SOLUTION ENDOTRACHEAL; INTRACARDIAC; INTRAVENOUS at 21:00

## 2019-01-01 RX ADMIN — ACETYLCYSTEINE 1180 MG: 200 INJECTION, SOLUTION INTRAVENOUS at 21:15

## 2019-01-01 RX ADMIN — ALBUMIN (HUMAN) 12.5 G: 12.5 INJECTION, SOLUTION INTRAVENOUS at 20:05

## 2019-01-01 RX ADMIN — THIAMINE HYDROCHLORIDE 200 MG: 100 INJECTION, SOLUTION INTRAMUSCULAR; INTRAVENOUS at 10:26

## 2019-01-01 RX ADMIN — METRONIDAZOLE 500 MG: 500 INJECTION, SOLUTION INTRAVENOUS at 10:53

## 2019-01-01 RX ADMIN — Medication 20000 ML: at 23:17

## 2019-01-01 RX ADMIN — EPINEPHRINE 2 MCG/MIN: 1 INJECTION, SOLUTION, CONCENTRATE INTRAVENOUS at 22:30

## 2019-01-01 RX ADMIN — SODIUM CHLORIDE 100 ML/HR: 234 INJECTION INTRAMUSCULAR; INTRAVENOUS; SUBCUTANEOUS at 18:08

## 2019-01-01 RX ADMIN — Medication 20 MG: at 03:08

## 2019-01-01 RX ADMIN — PANTOPRAZOLE SODIUM 40 MG: 40 INJECTION, POWDER, FOR SOLUTION INTRAVENOUS at 08:09

## 2019-01-01 RX ADMIN — NOREPINEPHRINE BITARTRATE 30 MCG/MIN: 1 INJECTION INTRAVENOUS at 10:03

## 2019-01-01 RX ADMIN — HYDROCORTISONE SODIUM SUCCINATE 100 MG: 100 INJECTION, POWDER, FOR SOLUTION INTRAMUSCULAR; INTRAVENOUS at 23:45

## 2019-01-01 RX ADMIN — THIAMINE HYDROCHLORIDE 200 MG: 100 INJECTION, SOLUTION INTRAMUSCULAR; INTRAVENOUS at 09:02

## 2019-01-01 RX ADMIN — HYDROCORTISONE SODIUM SUCCINATE 50 MG: 100 INJECTION, POWDER, FOR SOLUTION INTRAMUSCULAR; INTRAVENOUS at 15:14

## 2019-01-01 RX ADMIN — SODIUM CHLORIDE 1000 ML: 0.9 INJECTION, SOLUTION INTRAVENOUS at 21:47

## 2019-01-01 RX ADMIN — ASCORBIC ACID 1500 MG: 500 INJECTION, SOLUTION INTRAMUSCULAR; INTRAVENOUS; SUBCUTANEOUS at 15:45

## 2019-01-01 RX ADMIN — NOREPINEPHRINE BITARTRATE 30 MCG/MIN: 1 INJECTION INTRAVENOUS at 14:32

## 2019-01-01 RX ADMIN — NOREPINEPHRINE BITARTRATE 30 MCG/MIN: 1 INJECTION INTRAVENOUS at 01:26

## 2019-01-01 RX ADMIN — NOREPINEPHRINE BITARTRATE 30 MCG/MIN: 1 INJECTION INTRAVENOUS at 10:15

## 2019-01-01 RX ADMIN — CALCIUM GLUCONATE 2 G: 20 INJECTION, SOLUTION INTRAVENOUS at 18:40

## 2019-01-01 RX ADMIN — Medication 20000 ML: at 06:36

## 2019-01-01 RX ADMIN — DEXTROSE MONOHYDRATE 25 ML: 25 INJECTION, SOLUTION INTRAVENOUS at 03:48

## 2019-01-01 RX ADMIN — NOREPINEPHRINE BITARTRATE 30 MCG/MIN: 1 INJECTION INTRAVENOUS at 22:13

## 2019-01-01 RX ADMIN — PHENYLEPHRINE HYDROCHLORIDE 130 MCG/MIN: 10 INJECTION INTRAVENOUS at 08:23

## 2019-01-01 RX ADMIN — NOREPINEPHRINE BITARTRATE 30 MCG/MIN: 1 INJECTION INTRAVENOUS at 15:13

## 2019-01-01 RX ADMIN — PHENYLEPHRINE HYDROCHLORIDE 160 MCG/MIN: 10 INJECTION INTRAVENOUS at 02:44

## 2019-01-01 RX ADMIN — CALCIUM GLUCONATE 1 G: 20 INJECTION, SOLUTION INTRAVENOUS at 04:43

## 2019-01-01 RX ADMIN — HYDROCORTISONE SODIUM SUCCINATE 50 MG: 100 INJECTION, POWDER, FOR SOLUTION INTRAMUSCULAR; INTRAVENOUS at 03:11

## 2019-01-01 RX ADMIN — ASCORBIC ACID 1500 MG: 500 INJECTION, SOLUTION INTRAMUSCULAR; INTRAVENOUS; SUBCUTANEOUS at 23:38

## 2019-01-01 RX ADMIN — ALBUMIN (HUMAN) 12.5 G: 12.5 INJECTION, SOLUTION INTRAVENOUS at 22:02

## 2019-01-01 RX ADMIN — VANCOMYCIN HYDROCHLORIDE 2000 MG: 1 INJECTION, POWDER, LYOPHILIZED, FOR SOLUTION INTRAVENOUS at 11:00

## 2019-01-01 RX ADMIN — LACTULOSE 200 G: 10 SOLUTION ORAL; RECTAL at 13:57

## 2019-01-01 RX ADMIN — Medication 20 MG: at 06:21

## 2019-01-01 RX ADMIN — KETAMINE HYDROCHLORIDE 0.2 MG/KG/HR: 50 INJECTION, SOLUTION INTRAMUSCULAR; INTRAVENOUS at 00:08

## 2019-01-01 RX ADMIN — HYDROCORTISONE SODIUM SUCCINATE 50 MG: 100 INJECTION, POWDER, FOR SOLUTION INTRAMUSCULAR; INTRAVENOUS at 20:41

## 2019-01-01 RX ADMIN — Medication 20000 ML: at 10:45

## 2019-01-01 RX ADMIN — SODIUM CHLORIDE 100 ML/HR: 234 INJECTION INTRAMUSCULAR; INTRAVENOUS; SUBCUTANEOUS at 02:12

## 2019-01-01 RX ADMIN — ASCORBIC ACID 1500 MG: 500 INJECTION, SOLUTION INTRAMUSCULAR; INTRAVENOUS; SUBCUTANEOUS at 22:12

## 2019-01-01 RX ADMIN — WHITE PETROLATUM 57.7 %-MINERAL OIL 31.9 % EYE OINTMENT: at 08:27

## 2019-01-01 RX ADMIN — SODIUM CHLORIDE 100 ML/HR: 234 INJECTION INTRAMUSCULAR; INTRAVENOUS; SUBCUTANEOUS at 11:53

## 2019-01-01 RX ADMIN — SODIUM PHOSPHATE, MONOBASIC, MONOHYDRATE 6 MMOL: 276; 142 INJECTION, SOLUTION INTRAVENOUS at 13:46

## 2019-01-01 RX ADMIN — CALCIUM GLUCONATE 1 G: 20 INJECTION, SOLUTION INTRAVENOUS at 00:40

## 2019-01-01 RX ADMIN — SODIUM CHLORIDE 1000 ML: 0.9 INJECTION, SOLUTION INTRAVENOUS at 22:38

## 2019-01-01 RX ADMIN — Medication 20 MG: at 03:05

## 2019-01-01 RX ADMIN — MAGNESIUM SULFATE HEPTAHYDRATE 1 G: 1 INJECTION, SOLUTION INTRAVENOUS at 19:06

## 2019-01-01 RX ADMIN — THIAMINE HYDROCHLORIDE 200 MG: 100 INJECTION, SOLUTION INTRAMUSCULAR; INTRAVENOUS at 22:30

## 2019-01-01 RX ADMIN — NOREPINEPHRINE BITARTRATE 30 MCG/MIN: 1 INJECTION INTRAVENOUS at 07:49

## 2019-01-01 RX ADMIN — PHENYLEPHRINE HYDROCHLORIDE 160 MCG/MIN: 10 INJECTION INTRAVENOUS at 06:19

## 2019-01-01 RX ADMIN — ALBUTEROL SULFATE 5 MG: 2.5 SOLUTION RESPIRATORY (INHALATION) at 21:51

## 2019-01-01 RX ADMIN — DEXTROSE MONOHYDRATE 25 ML: 500 INJECTION PARENTERAL at 03:48

## 2019-01-01 RX ADMIN — SODIUM PHOSPHATE, MONOBASIC, MONOHYDRATE 21 MMOL: 276; 142 INJECTION, SOLUTION INTRAVENOUS at 11:27

## 2019-01-01 RX ADMIN — NOREPINEPHRINE BITARTRATE 25 MCG/MIN: 1 INJECTION INTRAVENOUS at 03:48

## 2019-01-01 RX ADMIN — THIAMINE HYDROCHLORIDE 200 MG: 100 INJECTION, SOLUTION INTRAMUSCULAR; INTRAVENOUS at 10:07

## 2019-01-01 RX ADMIN — SODIUM BICARBONATE 50 MEQ: 84 INJECTION, SOLUTION INTRAVENOUS at 22:22

## 2019-01-01 RX ADMIN — DEXTROSE AND SODIUM CHLORIDE 100 ML/HR: 5; .9 INJECTION, SOLUTION INTRAVENOUS at 06:27

## 2019-01-01 RX ADMIN — ASCORBIC ACID 1500 MG: 500 INJECTION, SOLUTION INTRAMUSCULAR; INTRAVENOUS; SUBCUTANEOUS at 10:45

## 2019-01-01 RX ADMIN — DEXMEDETOMIDINE 0.6 MCG/KG/HR: 100 INJECTION, SOLUTION, CONCENTRATE INTRAVENOUS at 15:19

## 2019-01-01 RX ADMIN — NOREPINEPHRINE BITARTRATE 30 MCG/MIN: 1 INJECTION INTRAVENOUS at 05:50

## 2019-01-01 RX ADMIN — HYDROCORTISONE SODIUM SUCCINATE 50 MG: 100 INJECTION, POWDER, FOR SOLUTION INTRAMUSCULAR; INTRAVENOUS at 20:34

## 2019-01-01 RX ADMIN — NOREPINEPHRINE BITARTRATE 30 MCG/MIN: 1 INJECTION INTRAVENOUS at 16:13

## 2019-01-01 RX ADMIN — POTASSIUM CHLORIDE 40 MEQ: 29.8 INJECTION, SOLUTION INTRAVENOUS at 18:33

## 2019-01-01 RX ADMIN — DEXMEDETOMIDINE 0.6 MCG/KG/HR: 100 INJECTION, SOLUTION, CONCENTRATE INTRAVENOUS at 05:11

## 2019-01-01 RX ADMIN — CALCIUM GLUCONATE 2 G: 20 INJECTION, SOLUTION INTRAVENOUS at 06:49

## 2019-01-01 RX ADMIN — AMPICILLIN SODIUM 2000 MG: 2 INJECTION, POWDER, FOR SOLUTION INTRAMUSCULAR; INTRAVENOUS at 07:33

## 2019-01-01 RX ADMIN — NOREPINEPHRINE BITARTRATE 30 MCG/MIN: 1 INJECTION INTRAVENOUS at 05:21

## 2019-01-01 RX ADMIN — PHENYLEPHRINE HYDROCHLORIDE 150 MCG/MIN: 10 INJECTION INTRAVENOUS at 19:15

## 2019-01-01 RX ADMIN — NOREPINEPHRINE BITARTRATE 30 MCG/MIN: 1 INJECTION INTRAVENOUS at 07:35

## 2019-01-01 RX ADMIN — DEXMEDETOMIDINE 0.7 MCG/KG/HR: 100 INJECTION, SOLUTION, CONCENTRATE INTRAVENOUS at 21:43

## 2019-01-01 RX ADMIN — Medication 50 MCG/HR: at 21:32

## 2019-01-01 RX ADMIN — WHITE PETROLATUM 57.7 %-MINERAL OIL 31.9 % EYE OINTMENT: at 17:37

## 2019-01-01 RX ADMIN — ASCORBIC ACID 1500 MG: 500 INJECTION, SOLUTION INTRAMUSCULAR; INTRAVENOUS; SUBCUTANEOUS at 10:32

## 2019-01-01 RX ADMIN — PHENYLEPHRINE HYDROCHLORIDE 180 MCG/MIN: 10 INJECTION INTRAVENOUS at 13:28

## 2019-01-01 RX ADMIN — CALCIUM GLUCONATE 2 G: 20 INJECTION, SOLUTION INTRAVENOUS at 08:05

## 2019-01-01 RX ADMIN — ASCORBIC ACID 1500 MG: 500 INJECTION, SOLUTION INTRAMUSCULAR; INTRAVENOUS; SUBCUTANEOUS at 16:41

## 2019-01-01 RX ADMIN — DEXMEDETOMIDINE 0.6 MCG/KG/HR: 100 INJECTION, SOLUTION, CONCENTRATE INTRAVENOUS at 13:31

## 2019-01-01 RX ADMIN — MAGNESIUM SULFATE HEPTAHYDRATE 1 G: 1 INJECTION, SOLUTION INTRAVENOUS at 13:23

## 2019-01-01 RX ADMIN — CALCIUM GLUCONATE 3 G: 98 INJECTION, SOLUTION INTRAVENOUS at 02:06

## 2019-01-01 RX ADMIN — ASCORBIC ACID 1500 MG: 500 INJECTION, SOLUTION INTRAMUSCULAR; INTRAVENOUS; SUBCUTANEOUS at 16:11

## 2019-01-01 RX ADMIN — CALCIUM GLUCONATE 3 G: 98 INJECTION, SOLUTION INTRAVENOUS at 08:15

## 2019-01-01 RX ADMIN — POTASSIUM CHLORIDE 20 MEQ: 14.9 INJECTION, SOLUTION INTRAVENOUS at 07:15

## 2019-01-01 RX ADMIN — AMPICILLIN SODIUM 2000 MG: 2 INJECTION, POWDER, FOR SOLUTION INTRAMUSCULAR; INTRAVENOUS at 21:39

## 2019-01-01 RX ADMIN — HYDROCORTISONE SODIUM SUCCINATE 50 MG: 100 INJECTION, POWDER, FOR SOLUTION INTRAMUSCULAR; INTRAVENOUS at 02:56

## 2019-01-01 RX ADMIN — THIAMINE HYDROCHLORIDE 200 MG: 100 INJECTION, SOLUTION INTRAMUSCULAR; INTRAVENOUS at 22:08

## 2019-01-01 RX ADMIN — FENTANYL CITRATE 50 MCG: 50 INJECTION, SOLUTION INTRAMUSCULAR; INTRAVENOUS at 21:26

## 2019-01-01 RX ADMIN — DEXMEDETOMIDINE 1 MCG/KG/HR: 100 INJECTION, SOLUTION, CONCENTRATE INTRAVENOUS at 08:32

## 2019-01-01 RX ADMIN — FENTANYL CITRATE 50 MCG: 50 INJECTION, SOLUTION INTRAMUSCULAR; INTRAVENOUS at 21:54

## 2019-01-01 RX ADMIN — VASOPRESSIN 0.04 UNITS/MIN: 20 INJECTION INTRAVENOUS at 03:39

## 2019-01-01 RX ADMIN — WHITE PETROLATUM 57.7 %-MINERAL OIL 31.9 % EYE OINTMENT: at 08:15

## 2019-01-01 RX ADMIN — POTASSIUM CHLORIDE 20 MEQ: 14.9 INJECTION, SOLUTION INTRAVENOUS at 09:16

## 2019-01-01 RX ADMIN — NOREPINEPHRINE BITARTRATE 30 MCG/MIN: 1 INJECTION INTRAVENOUS at 10:45

## 2019-01-01 RX ADMIN — DEXTROSE 50 % IN WATER (D50W) INTRAVENOUS SYRINGE 25 ML: at 21:57

## 2019-01-01 RX ADMIN — SODIUM CHLORIDE 100 ML/HR: 234 INJECTION INTRAMUSCULAR; INTRAVENOUS; SUBCUTANEOUS at 07:54

## 2019-01-01 RX ADMIN — DEXMEDETOMIDINE 0.6 MCG/KG/HR: 100 INJECTION, SOLUTION, CONCENTRATE INTRAVENOUS at 23:08

## 2019-01-01 RX ADMIN — THIAMINE HYDROCHLORIDE 200 MG: 100 INJECTION, SOLUTION INTRAMUSCULAR; INTRAVENOUS at 23:44

## 2019-01-01 RX ADMIN — ASCORBIC ACID 1500 MG: 500 INJECTION, SOLUTION INTRAMUSCULAR; INTRAVENOUS; SUBCUTANEOUS at 04:06

## 2019-01-01 RX ADMIN — VASOPRESSIN 0.04 UNITS/MIN: 20 INJECTION INTRAVENOUS at 03:55

## 2019-01-01 RX ADMIN — DEXTROSE MONOHYDRATE 25 ML: 25 INJECTION, SOLUTION INTRAVENOUS at 21:57

## 2019-01-01 RX ADMIN — METRONIDAZOLE 500 MG: 500 INJECTION, SOLUTION INTRAVENOUS at 15:35

## 2019-01-01 RX ADMIN — PHENYLEPHRINE HYDROCHLORIDE 120 MCG/MIN: 10 INJECTION INTRAVENOUS at 12:59

## 2019-01-01 RX ADMIN — HYDROCORTISONE SODIUM SUCCINATE 50 MG: 100 INJECTION, POWDER, FOR SOLUTION INTRAMUSCULAR; INTRAVENOUS at 08:09

## 2019-01-01 RX ADMIN — VASOPRESSIN 0.04 UNITS/MIN: 20 INJECTION INTRAVENOUS at 18:43

## 2019-01-01 RX ADMIN — PHENYLEPHRINE HYDROCHLORIDE 140 MCG/MIN: 10 INJECTION INTRAVENOUS at 21:02

## 2019-01-01 RX ADMIN — Medication 48 MG: at 22:00

## 2019-01-01 RX ADMIN — NOREPINEPHRINE BITARTRATE 30 MCG/MIN: 1 INJECTION INTRAVENOUS at 17:23

## 2019-01-01 RX ADMIN — PHENYLEPHRINE HYDROCHLORIDE 180 MCG/MIN: 10 INJECTION INTRAVENOUS at 23:05

## 2019-01-01 RX ADMIN — DEXMEDETOMIDINE 0.7 MCG/KG/HR: 100 INJECTION, SOLUTION, CONCENTRATE INTRAVENOUS at 06:20

## 2019-01-01 RX ADMIN — NOREPINEPHRINE BITARTRATE 30 MCG/MIN: 1 INJECTION INTRAVENOUS at 12:25

## 2019-01-01 RX ADMIN — KETAMINE HYDROCHLORIDE 0.2 MG/KG/HR: 50 INJECTION, SOLUTION INTRAMUSCULAR; INTRAVENOUS at 06:30

## 2019-01-01 RX ADMIN — VASOPRESSIN 0.04 UNITS/MIN: 20 INJECTION INTRAVENOUS at 08:24

## 2019-01-01 RX ADMIN — AMPICILLIN SODIUM 2000 MG: 2 INJECTION, POWDER, FOR SOLUTION INTRAMUSCULAR; INTRAVENOUS at 17:21

## 2019-01-01 RX ADMIN — Medication 20000 ML: at 15:30

## 2019-01-01 RX ADMIN — AMPICILLIN SODIUM 2000 MG: 2 INJECTION, POWDER, FOR SOLUTION INTRAMUSCULAR; INTRAVENOUS at 15:33

## 2019-01-01 RX ADMIN — ASCORBIC ACID 1500 MG: 500 INJECTION, SOLUTION INTRAMUSCULAR; INTRAVENOUS; SUBCUTANEOUS at 11:41

## 2019-01-01 RX ADMIN — WHITE PETROLATUM 57.7 %-MINERAL OIL 31.9 % EYE OINTMENT: at 17:43

## 2019-01-01 RX ADMIN — Medication 50 MEQ: at 23:09

## 2019-01-01 RX ADMIN — KETAMINE HYDROCHLORIDE 0.2 MG/KG/HR: 50 INJECTION, SOLUTION INTRAMUSCULAR; INTRAVENOUS at 03:51

## 2019-01-01 RX ADMIN — VASOPRESSIN 0.04 UNITS/MIN: 20 INJECTION INTRAVENOUS at 22:43

## 2019-01-01 RX ADMIN — NOREPINEPHRINE BITARTRATE 30 MCG/MIN: 1 INJECTION INTRAVENOUS at 12:38

## 2019-01-01 RX ADMIN — HYDROCORTISONE SODIUM SUCCINATE 50 MG: 100 INJECTION, POWDER, FOR SOLUTION INTRAMUSCULAR; INTRAVENOUS at 14:46

## 2019-01-01 RX ADMIN — METRONIDAZOLE 500 MG: 500 INJECTION, SOLUTION INTRAVENOUS at 07:59

## 2019-01-01 RX ADMIN — ALBUMIN, HUMAN INJ 5% 12.5 G: 5 SOLUTION at 20:05

## 2019-01-01 RX ADMIN — NOREPINEPHRINE BITARTRATE 30 MCG/MIN: 1 INJECTION INTRAVENOUS at 21:01

## 2019-01-01 RX ADMIN — SODIUM CHLORIDE 100 ML/HR: 234 INJECTION INTRAMUSCULAR; INTRAVENOUS; SUBCUTANEOUS at 05:13

## 2019-01-01 RX ADMIN — NOREPINEPHRINE BITARTRATE 30 MCG/MIN: 1 INJECTION INTRAVENOUS at 19:49

## 2019-01-01 RX ADMIN — CEFEPIME HYDROCHLORIDE 2000 MG: 2 INJECTION, POWDER, FOR SOLUTION INTRAVENOUS at 09:43

## 2019-01-01 RX ADMIN — Medication 20000 ML: at 19:29

## 2019-01-01 RX ADMIN — Medication 20000 ML: at 04:31

## 2019-01-01 RX ADMIN — ACETYLCYSTEINE 14140 MG: 200 INJECTION, SOLUTION INTRAVENOUS at 18:38

## 2019-01-01 RX ADMIN — AMPICILLIN SODIUM 2000 MG: 2 INJECTION, POWDER, FOR SOLUTION INTRAMUSCULAR; INTRAVENOUS at 00:06

## 2019-01-01 RX ADMIN — SODIUM CHLORIDE 1000 ML: 0.9 INJECTION, SOLUTION INTRAVENOUS at 22:20

## 2019-01-31 PROBLEM — C22.2 HEPATOBLASTOMA (HCC): Status: ACTIVE | Noted: 2019-01-01

## 2019-01-31 NOTE — PROGRESS NOTES
Assessment/Plan:    Hepatoblastoma ([address] )  Doing remarkably well he denies any abdominal pain fever chills he had an episode of fever probably tumor fever a few weeks ago which resolved  He is being followed by Oncology and hepatology quite closely  He takes ibuprofen whenever he has a fever  He is on the following medication  LENVIMA 12mg        Diagnoses and all orders for this visit:    Hepatoblastoma ([address] )  -     ibuprofen (MOTRIN) 200 mg tablet; Take 1 tablet (200 mg total) by mouth every 6 (six) hours as needed for mild pain    Other orders  -     Lenvatinib Mesylate (LENVIMA 12 MG DAILY DOSE PO); Take by mouth          Subjective:      Patient ID: Omar Funes is a 29 y o  male  Chief Complaint   Patient presents with    Follow-up         Current Outpatient Prescriptions:     Lenvatinib Mesylate (LENVIMA 12 MG DAILY DOSE PO), Take by mouth, Disp: , Rfl:     ibuprofen (MOTRIN) 200 mg tablet, Take 1 tablet (200 mg total) by mouth every 6 (six) hours as needed for mild pain, Disp: , Rfl:     Patient came in after not being here for about 3 years he has been fighting hepatoblastoma had recently admission for small-bowel obstruction that resolved with conservative therapy had another admission back in the summer with leg pain which there was no evidence of the deep vein thrombosis he is in good spirits  He plays basketball to occasionally he runs whenever he can  Liver function studies were review alkaline phosphatase the elevated alpha-fetoprotein is pending  Just so his oncologist yesterday  He denies being depressed or anxious is an amazing person he has been fighting this cancer since we discover as in our office back in October 2013      In 2016 day of order 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 which was unfortunately it is gone to different protocols chemotherapy at this particular time labs were review a CBC was 15 hematocrit 44 white count 6000 platelet count [id number]  Electrolytes were normal sodium 136 potassium 4 5 renal function normal BUN 60 creatinine 0 9 glucose 93  Liver function studies AST 65 ALT 83 alkaline phosphatase 396  Last alpha-fetoprotein is 12 10    Recommendations will be to see if he is a candidate for the pneumococcal vaccine booster normal back last dose was 2015 to check with Oncology and also the Tdap vaccine the last dose was in [address]  Takes ibuprofen for fever as needed  The following portions of the patient's history were reviewed and updated as appropriate: allergies, current medications, past family history, past medical history, past social history, past surgical history and problem list     Review of Systems   Constitutional: Negative  Negative for activity change, appetite change, fatigue, fever and unexpected weight change  HENT: Negative for congestion, ear pain, hearing loss, mouth sores, postnasal drip, rhinorrhea, sore throat, trouble swallowing and voice change  Eyes: Negative for pain, redness and visual disturbance  Respiratory: Negative for cough, chest tightness, shortness of breath and wheezing  Cardiovascular: Negative for chest pain, palpitations and leg swelling  Gastrointestinal: Negative for abdominal distention, abdominal pain, blood in stool, constipation, diarrhea and nausea  Endocrine: Negative for cold intolerance, heat intolerance, polydipsia, polyphagia and polyuria  Genitourinary: Negative for difficulty urinating, dysuria, flank pain, frequency, hematuria and urgency  Musculoskeletal: Negative for arthralgias, back pain, gait problem, joint swelling and myalgias  Skin: Negative for color change and pallor  Neurological: Negative for dizziness, tremors, seizures, syncope, weakness, numbness and headaches  Hematological: Negative for adenopathy  Does not bruise/bleed easily  Psychiatric/Behavioral: Negative  Negative for sleep disturbance   The patient is not nervous/anxious  Objective:    Results for orders placed or performed in visit on 10/07/13   PT (Historical)   Result Value Ref Range    INR 1 08 0 86 - 1 16    Protime 13 5 11 8 - 14 1 Seconds   CBC AND DIFFERENTIAL (HISTORICAL)   Result Value Ref Range    WBC 14 50 (H) 4 17 - 10 16 Thousand/uL    RBC 5 19 3 93 - 5 64 Million/uL    Hemoglobin 13 7 11 9 - 16 7 g/dL    Hematocrit 40 8 35 9 - 49 6 %    MCV 79 (L) 81 - 97 fL    MCH 26 4 (L) 26 8 - 34 3 pg    MCHC 33 6 31 4 - 37 4 g/dL    RDW 13 7 11 6 - 15 1 %    MPV 8 9 8 9 - 12 7 fL    Platelets 238 (H) 985 - 360 Thousand/uL    nRBC 0 /100WBC    Neutrophils Relative 76 45 - 77 %    Lymphocytes Relative 14 14 - 44 %    Monocytes Relative 7 4 - 12 %    Eosinophils Relative 3 0 - 6 %    Basophils Relative 0 0 - 1 %    Neutrophils Absolute 11 02 (H) 1 88 - 7 82 Thousand/uL    Lymphocytes Absolute 2 03 0 58 - 4 47 Thousand/uL    Monocytes Absolute 1 02 0 17 - 1 22 Thousand/uL    Eosinophils Absolute 0 44 0 00 - 0 61 Thousand/uL    Basophils Absolute 0 00 0 00 - 0 10 Thousand/uL    DIFFERENTIAL METHOD Automated    HEPATITIS C QUAL BY PCR (HISTORICAL)   Result Value Ref Range    HEP C RNA QUAL Negative Negative   HEPATITIS B SURFACE ANTIGEN (HISTORICAL)   Result Value Ref Range    HEPATITIS B SURFACE ANTIGEN Nonreactive (NR) Nonreactiv   APTT (HISTORICAL)   Result Value Ref Range    PTT 34 24 - 35 Seconds       /81 (BP Location: Left arm, Patient Position: Sitting, Cuff Size: Standard)   Pulse 69   Temp 98 4 °F (36 9 °C)   Resp 14   Ht 6' 5 5" (1 969 m)   Wt 93 9 kg (207 lb)   BMI 24 23 kg/m²      Physical Exam   Constitutional: He is oriented to person, place, and time  He appears well-developed and well-nourished  HENT:   Head: Normocephalic  Right Ear: External ear normal    Left Ear: External ear normal    Nose: Nose normal    Mouth/Throat: Oropharynx is clear and moist  No oropharyngeal exudate     Eyes: Pupils are equal, round, and reactive to light  Conjunctivae and EOM are normal    Neck: Normal range of motion  Neck supple  No thyromegaly present  Cardiovascular: Normal rate, regular rhythm, normal heart sounds and intact distal pulses  Exam reveals no gallop and no friction rub  No murmur heard  S1-S2 regular rhythm  Extremities no edema  Blood pressure 130/80   Pulmonary/Chest: Effort normal and breath sounds normal  No respiratory distress  He has no wheezes  He has no rales  Lungs are clear no wheezing rales or rhonchi   Abdominal: Soft  Bowel sounds are normal  He exhibits no distension and no mass  There is no tenderness  There is no rebound and no guarding  Abdomen soft nontender no right upper quadrant tenderness no lymphadenopathy palpable in the neck supraclavicular epitrochlear axillary or inguinal   Musculoskeletal: Normal range of motion  Lymphadenopathy:     He has no cervical adenopathy  Neurological: He is alert and oriented to person, place, and time  Skin: Skin is warm and dry  Psychiatric: He has a normal mood and affect  His behavior is normal  Judgment normal    Nursing note and vitals reviewed

## 2019-01-31 NOTE — ASSESSMENT & PLAN NOTE
Doing remarkably well he denies any abdominal pain fever chills he had an episode of fever probably tumor fever a few weeks ago which resolved  He is being followed by Oncology and hepatology quite closely  He takes ibuprofen whenever he has a fever    He is on the following medication  LENVIMA 12mg

## 2019-01-31 NOTE — PATIENT INSTRUCTIONS
Ask your oncologist if we can give you the new mole coccal vaccine Pneumovax last dose was 2014    Also the Tdap booster

## 2019-12-29 PROBLEM — R77.8 ELEVATED TROPONIN: Status: ACTIVE | Noted: 2019-01-01

## 2019-12-29 PROBLEM — E87.1 HYPONATREMIA: Status: ACTIVE | Noted: 2019-01-01

## 2019-12-29 PROBLEM — K83.1 CHOLESTASIS: Status: ACTIVE | Noted: 2018-03-16

## 2019-12-29 PROBLEM — R57.9 SHOCK (HCC): Status: ACTIVE | Noted: 2019-01-01

## 2019-12-29 PROBLEM — E87.2 LACTIC ACID ACIDOSIS: Status: ACTIVE | Noted: 2019-01-01

## 2019-12-29 PROBLEM — E87.5 HYPERKALEMIA: Status: ACTIVE | Noted: 2019-01-01

## 2019-12-29 PROBLEM — K56.600 PARTIAL SMALL BOWEL OBSTRUCTION (HCC): Status: ACTIVE | Noted: 2018-12-04

## 2019-12-29 PROBLEM — D72.829 LEUKOCYTOSIS: Status: ACTIVE | Noted: 2019-01-01

## 2019-12-29 PROBLEM — K56.609 SMALL BOWEL OBSTRUCTION (HCC): Status: ACTIVE | Noted: 2019-01-01

## 2019-12-29 PROBLEM — E87.2 METABOLIC ACIDOSIS: Status: ACTIVE | Noted: 2019-01-01

## 2019-12-29 PROBLEM — K72.90 LIVER FAILURE (HCC): Status: ACTIVE | Noted: 2019-01-01

## 2019-12-29 PROBLEM — E16.2 HYPOGLYCEMIA: Status: ACTIVE | Noted: 2019-01-01

## 2019-12-29 PROBLEM — N17.9 AKI (ACUTE KIDNEY INJURY) (HCC): Status: ACTIVE | Noted: 2019-01-01

## 2019-12-29 PROBLEM — K72.90 HEPATIC ENCEPHALOPATHY (HCC): Status: ACTIVE | Noted: 2019-01-01

## 2019-12-29 PROBLEM — R74.01 TRANSAMINITIS: Status: ACTIVE | Noted: 2019-01-01

## 2019-12-29 PROBLEM — T68.XXXA HYPOTHERMIA: Status: ACTIVE | Noted: 2019-01-01

## 2019-12-29 PROBLEM — R79.1 ELEVATED INR: Status: ACTIVE | Noted: 2019-01-01

## 2019-12-29 PROBLEM — R34 ANURIA: Status: ACTIVE | Noted: 2019-01-01

## 2019-12-29 NOTE — PROCEDURES
Arterial Line Insertion  Date/Time: 12/29/2019 5:56 AM  Performed by: Telma Davenport MD  Authorized by: Telma Davenport MD     Patient location:  Bedside  Consent:     Consent obtained:  Verbal    Consent given by:  Patient    Risks discussed:  Bleeding, ischemia, infection and pain  Universal protocol:     Patient identity confirmed:  Verbally with patient and arm band  Indications:     Indications: hemodynamic monitoring, multiple ABGs and frequent labs / infusion    Pre-procedure details:     Skin preparation:  Chlorhexidine    Preparation: Patient was prepped and draped in sterile fashion    Sedation:     Sedation type: Moderate (conscious) sedation  Anesthesia (see MAR for exact dosages): Anesthesia method:  Local infiltration    Local anesthetic:  Lidocaine 1% WITH epi  Procedure details:     Location / Tip of Catheter:  Radial    Laterality:  Right    Needle gauge:  20 G    Placement technique:  Ultrasound guided    Number of attempts:  1    Successful placement: yes      Transducer: waveform confirmed    Post-procedure details:     Post-procedure:  Biopatch applied    Patient tolerance of procedure:   Tolerated well, no immediate complications

## 2019-12-29 NOTE — PROGRESS NOTES
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 TMA secondary to Ramucirumab (VEGF inhibitor) vs recent use of Nivolumab (from 10/16 to 12/4/19) can cause immune mediated nephritis (check point inhibitor) vs Abemaciclib (from 10/16 to 12/4/19) can cause increase in creatinine vs rule out tumor lysis syndrome vs Coumadin nephropathy (INR greater than 15)  -bladder scan nonsignificant  -status post 4 L IV fluid bolus in the ER without any significant urine output  -given severe renal failure, acidosis, life-threatening hyperkalemia, anuria, severe lactic acidosis, patient was started on CVVHD on 12/29/19  Patient was seen and examined on CVVHD at 6:30 a m  Jitendra Acevedo Tolerating dialysis well so far    Continue current prescription  -mother has provided consent which is in the chart   -avoid any further chemotherapy for time being  -avoid other nephrotoxins or NSAIDs  -when urine sample is available, check urinalysis microscopy, urine sodium, urine chloride, urine creatinine   -uric acid level 14 6     Severe life-threatening hyperkalemia  -this seems to be overall slowly improving, most recent serum potassium 6 7, continue current 0 K bath on CRRT and when serum potassium improves and less than 5 5, will change to two K bath and when serum potassium less than four, will change to 4K bath  -initial wide complex tachycardia overall improved with calcium gluconate and medical management  -strict low-potassium diet     Severe hyponatremia, serum sodium initially 119 on istat, 126 on CMP, continue to closely monitor   -avoid rapid correction and no more than 8 to 10 mEq correction in 24 hours  -monitor on dialysis closely  -may consider hypotonic IV fluid if continued to worsen further      Severe metabolic/lactic acidosis  -lactic acid level 10 1 likely in the setting of hypotension, liver dysfunction, malignancy  -continue CVVHD  -continue to trend lactic acid level     Altered mental status, concern for hepatic encephalopathy versus rule out sepsis, ammonia level 186  Closely monitor on dialysis, further management as per ICU team    Severe shock, currently remains on three vaso pressors, continue for close monitoring in the ICU  Anemia, hemolysis smear shows positive schistocytes, consider Hematology consultation  Discussed with ICU team     SUBJECTIVE:  Patient seen and examined at bedside  Remains critically ill and now remains on three vessel pressors  Urine output remains very minimal     OBJECTIVE:  Current Weight: Weight - Scale: 95 3 kg (210 lb 1 6 oz)  There were no vitals filed for this visit    No intake or output data in the 24 hours ending 12/29/19 0655  Wt Readings from Last 3 Encounters:   12/29/19 95 3 kg (210 lb 1 6 oz)   12/28/19 95 3 kg (210 lb 1 6 oz)   01/31/19 93 9 kg (207 lb)     Temp Readings from Last 3 Encounters:   12/28/19 (!) 94 6 °F (34 8 °C) (Rectal)   01/31/19 98 4 °F (36 9 °C)     BP Readings from Last 3 Encounters:   12/29/19 102/61   01/31/19 122/81   06/16/16 110/70     Pulse Readings from Last 3 Encounters:   12/29/19 (!) 106   01/31/19 69   06/16/16 76      Physical Examination:  General:  Lying in bed, no acute distress   Eyes:  Mild conjunctival pallor present, sclerae icteric  ENT:  External examination of ears and nose unremarkable  Neck:  No obvious lymphadenopathy appreciated  Respiratory:  Bilateral air entry present  CVS:  S1, S2 present  GI:  Soft, non distended  CNS:  Overall sleepy, lethargic, drowsy  Extremities:  Trace to 1+ edema in both legs  Skin:  No new rash in legs, jaundiced skin    Medications:    Current Facility-Administered Medications:     chlorhexidine (PERIDEX) 0 12 % oral rinse 15 mL, 15 mL, Swish & Spit, Q12H [address], Sandi Garcia MD    dexmedetomidine (PRECEDEX) 400 mcg in sodium chloride 0 9 % 100 mL infusion, 0 1-0 7 mcg/kg/hr, Intravenous, Titrated, Josesito Garcia MD, Last Rate: 23 8 mL/hr at 12/29/19 0615, 1 mcg/kg/hr at 12/29/19 0615    dextrose 5 % and sodium chloride 0 9 % infusion, 100 mL/hr, Intravenous, Continuous, Sandi Garcia MD, Last Rate: 100 mL/hr at 12/29/19 0627, 100 mL/hr at 12/29/19 7309    haloperidol lactate (HALDOL) 5 mg/mL injection **ADS Override Pull**, , , ,     haloperidol lactate (HALDOL) injection 2 mg, 2 mg, Intravenous, Once, Sugar Van PA-C    ketamine 250 mg (STANDARD CONCENTRATION) IV in sodium chloride 0 9% 250 mL, 0 2 mg/kg/hr, Intravenous, Continuous, Sugar Van PA-C, Last Rate: 19 1 mL/hr at 12/29/19 0630, 0 2 mg/kg/hr at 12/29/19 0630    lactulose retention enema 200 g, 200 g, Rectal, Once, Sandi Garcia MD    lidocaine-epinephrine (XYLOCAINE-MPF/EPINEPHRINE) 1%-1:200,000 injection 10 mL, 10 mL, Infiltration, Once, Sandi Garcia MD    norepinephrine (LEVOPHED) 4 mg (STANDARD CONCENTRATION) IV in sodium chloride 0 9% 250 mL, 1-30 mcg/min, Intravenous, Titrated, Josesito Garcia MD, Last Rate: 112 5 mL/hr at 12/29/19 0616, 30 mcg/min at 12/29/19 0616    NxStage K 0/Ca 3 dialysis solution (RFP-402) 20,000 mL, 20,000 mL, Dialysis, Continuous, Rangel Covarrubias MD, 20,000 mL at 12/29/19 0240    phytonadione (AQUA-MEPHYTON) 10 mg/mL 10 mg in sodium chloride 0 9 % 50 mL IVPB, 10 mg, Intravenous, Once, Silvestre Patel PA-C    sodium chloride (concentrated) 154 mEq in dextrose 10 % 1,000 mL infusion, 100 mL/hr, Intravenous, Continuous, Darrin Dakin Check, MD    vasopressin (PITRESSIN) 20 Units in sodium chloride 0 9 % 100 mL infusion, 0 04 Units/min, Intravenous, Continuous, Darrin Dakin Check, MD, Last Rate: 12 mL/hr at 12/29/19 0339, 0 04 Units/min at 12/29/19 7859    Laboratory Results:  Results from last 7 days   Lab Units 12/29/19  0422 12/29/19  0222 12/28/19  2330 12/28/19  2145 12/28/19  2144   WBC Thousand/uL  --   --   --   --  43 88*   HEMOGLOBIN g/dL  --   --   --   --  10 2*   I STAT HEMOGLOBIN g/dl  --   --  9 2* 11 9*  --    HEMATOCRIT %  --   --   --   --  32 6*   HEMATOCRIT, ISTAT %  --   --  27* 35*  --    PLATELETS Thousands/uL 656*  --   --   --  811*   SODIUM mmol/L  --  126*  --   --   --    POTASSIUM mmol/L  --  6 7*  --   --   --    CHLORIDE mmol/L  --  94*  --   --   --    CO2 mmol/L  --  12*  --   --   --    CO2, I-STAT mmol/L  --   --  12* 12*  --    BUN mg/dL  --  53*  --   --   --    CREATININE mg/dL  --  4 56*  --   --   --    CALCIUM mg/dL  --  6 7*  --   --   --    MAGNESIUM mg/dL  --  2 5  --   --   --    PHOSPHORUS mg/dL  --  12 6*  --   --  17 3*   GLUCOSE, ISTAT mg/dl  --   --  95 40*  --        XR chest portable ICU    (Results Pending)       Portions of the record may have been created with voice recognition software  Occasional wrong word or "sound a like" substitutions may have occurred due to the inherent limitations of voice recognition software  Read the chart carefully and recognize, using context, where substitutions have occurred

## 2019-12-29 NOTE — EMTALA/ACUTE CARE TRANSFER
[facility]  [address] [facility] [address]  Dept: [phone]      EMTALA TRANSFER CONSENT    NAME Whit Davis                                         1990                              MRN [id number]    I have been informed of my rights regarding examination, treatment, and transfer   by Dr Sunny Abebe DO    Benefits: Specialized equipment and/or services available at the receiving facility (Include comment)________________________    Risks: Potential for delay in receiving treatment      Consent for Transfer:  I acknowledge that my medical condition has been evaluated and explained to me by the emergency department physician or other qualified medical person and/or my attending physician, who has recommended that I be transferred to the service of  Accepting Physician: Dr Kip Olivera at [address] [place] [address] Name, [address] : [address]  The above potential benefits of such transfer, the potential risks associated with such transfer, and the probable risks of not being transferred have been explained to me, and I fully understand them  The doctor has explained that, in my case, the benefits of transfer outweigh the risks  I agree to be transferred  I authorize the performance of emergency medical procedures and treatments upon me in both transit and upon arrival at the receiving facility  Additionally, I authorize the release of any and all medical records to the receiving facility and request they be transported with me, if possible  I understand that the safest mode of transportation during a medical emergency is an ambulance and that the Hospital advocates the use of this mode of transport  Risks of traveling to the receiving facility by car, including absence of medical control, life sustaining equipment, such as oxygen, and medical personnel has been explained to me and I fully understand them      ([place] BELOW)  [  ]  I consent to the stated transfer and to be transported by ambulance/helicopter  [  ]  I consent to the stated transfer, but refuse transportation by ambulance and accept full responsibility for my transportation by car  I understand the risks of non-ambulance transfers and I exonerate the Hospital and its staff from any deterioration in my condition that results from this refusal     X___________________________________________    DATE  19  TIME________  Signature of patient or legally responsible individual signing on patient behalf           RELATIONSHIP TO PATIENT_________________________          Provider Certification    NAME Charly Davis                                         1990                              MRN [id number]    A medical screening exam was performed on the above named patient  Based on the examination:    Condition Necessitating Transfer There were no encounter diagnoses  Patient Condition: The patient has been stabilized such that within reasonable medical probability, no material deterioration of the patient condition or the condition of the unborn child(shiloh) is likely to result from the transfer    Reason for Transfer: Level of Care needed not available at this facility    Transfer Requirements: [facility] [address]   · Space available and qualified personnel available for treatment as acknowledged by    · Agreed to accept transfer and to provide appropriate medical treatment as acknowledged by       Dr Dixon Sutherland  · Appropriate medical records of the examination and treatment of the patient are provided at the time of transfer   [address] [facility], [address] _______  · Transfer will be performed by qualified personnel from    and appropriate transfer equipment as required, including the use of necessary and appropriate life support measures      Provider Certification: I have examined the patient and explained the following risks and benefits of being transferred/refusing transfer to the patient/family:  General risk, such as traffic hazards, adverse weather conditions, rough terrain or turbulence, possible failure of equipment (including vehicle or aircraft), or consequences of actions of persons outside the control of the transport personnel      Based on these reasonable risks and benefits to the patient and/or the unborn child(shiloh), and based upon the information available at the time of the patients examination, I certify that the medical benefits reasonably to be expected from the provision of appropriate medical treatments at another medical facility outweigh the increasing risks, if any, to the individuals medical condition, and in the case of labor to the unborn child, from effecting the transfer      X____________________________________________ DATE 12/28/19        TIME_______      ORIGINAL - SEND TO MEDICAL RECORDS   COPY - SEND WITH PATIENT DURING TRANSFER

## 2019-12-29 NOTE — ED NOTES
Patient's fluid bolus transitioned to fluid warmer at this time          Kory Jimenez RN  12/28/19 3252

## 2019-12-29 NOTE — PROGRESS NOTES
Vancomycin IV Pharmacy-to-Dose Consultation    Samy Long is a 34 y o  male who is currently receiving Vancomycin IV with management by the Pharmacy Consult service  Relevant clinical data and objective / subjective history reviewed  Vancomycin Assessment:  Indication: other Profound shock - likely sepsis of unknown source (also on Cefepime 2g q12h + IV Flagyl)  Renal Function: anuric; started CVVHD (2500mL/hr) this morning  Days of Therapy: 2  Current Dose: 1500mg (15mg/kg) IV Q12H (last dose: 2314 on 12/28)  Goal Trough: 15-20 (appropriate for most indications)   Goal AUC(24h): 400-600  Last Level: none      Vancomycin Plan:  New Dosing: change to 2000mg (20mg/kg) IV Q24H with a start ~12hr (1100 today) from the last 1500mg dose for an attempt to load the patient  Next Level: p/t 3rd 2000mg dose on Tue (12/31) at 1030 - note this may not be at steady-state  Renal Function Monitoring: at least every other day while AZALIA is ongoing      Pharmacy will continue to follow closely for s/sx of nephrotoxicity, infusion reactions and appropriateness of therapy  BMP and CBC will be ordered per protocol  We will continue to follow the patients culture results and clinical progress daily  Yan Hooper, Pharm  D , [facility]  Clinical Pharmacist, [address]  [phone] or [place]

## 2019-12-29 NOTE — PROGRESS NOTES
I reviewed admission with primary team    The patient is now calm and comfortable  Remains on 4 pressors and CVVHD  Repeat labs pending  Mom at bedside    Additional impressions/plans:    1  Profound shock - likely sepsis of unknown source  · Continue broad spectrum antibiotics with cefepime and vancomycin  · Will add flagyl with concern for intra-abdominal process  · Add Meric protocol - start solu-cortef, vitamin C and Thiamine    2  AZALIA - continue CVVHD as tolerated    3  Acute/chonirc liver failure in the setting of profound shock with underlying hepatocellular carcinoma  · Follow up on Coags   · Continue D10 for ongoing hypoglycemia    4   ICU Care  · Follow up repeat labs  · Palliative care consult pending - spoke with mom at bedside - we will continue aggressive measures short of intubation/CPR as long as we can also keep him comfortable - her goal is to try and get him home (she is aware how sick he is and that is unlikely)    Additional CC time excluding procedures and family updates 30 minutes

## 2019-12-29 NOTE — PROCEDURES
Temporary HD Catheter  Date/Time: 12/29/2019 2:02 AM  Performed by: Orlando Mccabe MD  Authorized by: Orlando Mccabe MD     Patient location:  ED  Other Assisting Provider: Yes (comment) (Dr Janise Harada)    Consent:     Consent obtained:  Verbal    Consent given by:  Patient    Risks discussed:  Arterial puncture, bleeding, incorrect placement and infection    Alternatives discussed:  No treatment  Universal protocol:     Patient identity confirmed:  Verbally with patient and arm band  Pre-procedure details:     Hand hygiene: Hand hygiene performed prior to insertion      Sterile barrier technique: All elements of maximal sterile technique followed      Skin preparation:  2% chlorhexidine and ChloraPrep    Skin preparation agent: Skin preparation agent completely dried prior to procedure    Indications:     Central line indications: medications requiring central line and dialysis    Sedation:     Sedation type: Moderate (conscious) sedation  Anesthesia (see MAR for exact dosages): Anesthesia method:  Local infiltration    Local anesthetic:  Lidocaine 1% w/o epi  Procedure details:     Location:  Right internal jugular    Vessel type: vein      Laterality:  Right    Approach: percutaneous technique used      Patient position:  Reverse Trendelenburg    Catheter type:  Triple lumen    Catheter size:  12 5 Fr    Catheter length:  16 cm    Landmarks identified: yes      Ultrasound guidance: yes      Sterile ultrasound techniques: Sterile gel and sterile probe covers were used      Number of attempts:  1    Successful placement: yes    Post-procedure details:     Post-procedure:  Dressing applied and line sutured    Assessment:  Blood return through all ports    Patient tolerance of procedure:   Tolerated well, no immediate complications    Observer: Yes      Observer name:  Dr Janise Harada

## 2019-12-29 NOTE — CONSULTS
Consultation - General Surgery   Bessie Juan Antonio Davis 34 y o  male MRN: [id number]  [facility] Encounter: [id number]    Assessment/Plan     Assessment:  33 y/o M with w/ Stage IV Hepatocellular carcinoma/hepatoblastoma, now w/ severe metabolic acidosis and renal failure requiring CVVHD, also p/w imaging findings consistent with SBO    Plan:  --NPO, NGT if nausea or vomiting  --Serial abdominal exams  --Continue CVVH per Nephrology  --No acute surgical intervention at this time due to high surgical risk  --Rest of care per MICU    History of Present Illness     HPI:  Ghulam Osborn is a 34 y o  male w/ Stage IV Hepatocellular carcinoma/hepatoblastoma (dx in 2013), with extensive treatment hx s/p extended left hepatic lobectomy, bile duct resection, liver hilum LN dissection, hepaticojejunostomy with Jyothi-en-Y jejunojejunostomy (10/2013--[facility]), multiple rounds of chemotherapy, 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), TACE x2 (2016), who p/w altered mental status, acute renal failure, and decreased urine output  Cr of 5 0 on iSTAT  Baseline Cr of 0 9 earlier this month  Pt initially presented yesterday to [facility] ED, and was seen by Nephrology, who recommended transfer to [facility] MICU for emergent CVVHD  CT CAP was obtained upon arrival due to SOB, weakness, and fatigue, showing innumerable hepatic lesions, multiple pulmonary nodules consistent with metastases, along with multiple moderately dilated loops of small bowel in the mid and lower abdomen, largest 4 8 cm in diameter with some fecal material in SB loops, with no discrete transition point identified, but suggestive of SBO  Acute Care Surgery was consulted in the management of this pt's SBO  Upon presentation, had WBC of 43 with 10% bands  Pt with lactic acidosis of 8 4 at midnight tonight, decreased from initial level of 9 4  Initial VBG yesterday showed pH of 7 13, base deficit of -18   Pt currently on chemotherapy with Lenvatinib prior to arrival, follows with Hematology/Oncology at [address] [place] [address]  Of note, pt is Level 3--DNR/DNI code status  Currently, pt is encephalopathic  Pt reports he is passing flatus and having bowel movements, but he is an unreliable historian due to his altered mental status  [email or web address]  Inpatient consult to Acute Care Surgery     Performed by  Tyra Son MD     Authorized by Orlando Mccabe MD              Review of Systems   Unable to perform ROS: Mental status change       Historical Information   No past medical history on file  Past Surgical History:   Procedure Laterality Date    TOOTH EXTRACTION       Social History   Social History     Substance and Sexual Activity   Alcohol Use No     Social History     Substance and Sexual Activity   Drug Use No     Social History     Tobacco Use   Smoking Status Never Smoker   Smokeless Tobacco Never Used     Family History:   Family History   Problem Relation Age of Onset    Diabetes Father     Breast cancer Maternal Grandmother     Breast cancer Paternal Grandmother     Ovarian cancer Paternal Grandmother     Stomach cancer Family        Meds/Allergies   current meds:   Current Facility-Administered Medications   Medication Dose Route Frequency    chlorhexidine (PERIDEX) 0 12 % oral rinse 15 mL  15 mL Swish & Spit Q12H [address]    Ketamine HCl 20 mg  20 mg Intravenous Once    Ketamine HCl 50 mg/5 mL **ADS Override Pull**        lactulose retention enema 200 g  200 g Rectal Once    lidocaine-epinephrine (XYLOCAINE-MPF/EPINEPHRINE) 1%-1:200,000 injection 10 mL  10 mL Infiltration Once    NxStage K 0/Ca 3 dialysis solution (RFP-402) 20,000 mL  20,000 mL Dialysis Continuous    phytonadione (AQUA-MEPHYTON) 10 mg/mL 10 mg in sodium chloride 0 9 % 50 mL IVPB  10 mg Intravenous Once    and PTA meds:   Prior to Admission Medications   Prescriptions Last Dose Informant Patient Reported? Taking? Lenvatinib Mesylate (LENVIMA 12 MG DAILY DOSE PO)   Yes No   Sig: Take by mouth   ibuprofen (MOTRIN) 200 mg tablet   No No   Sig: Take 1 tablet (200 mg total) by mouth every 6 (six) hours as needed for mild pain   Patient not taking: Reported on 12/28/2019      Facility-Administered Medications: None     No Known Allergies    Objective   First Vitals:        Current Vitals:        No intake or output data in the 24 hours ending 12/29/19 0238    Invasive Devices     Peripheral Intravenous Line            Peripheral IV 12/28/19 Left Arm less than 1 day    Peripheral IV 12/28/19 Right Antecubital less than 1 day    Peripheral IV 12/28/19 Right Forearm less than 1 day          Hemodialysis Catheter            HD Temporary Double Catheter less than 1 day                Physical Exam   Constitutional: He is oriented to person, place, and time  He appears well-developed and well-nourished  No distress  HENT:   Head: Normocephalic and atraumatic  Eyes: EOM are normal  Scleral icterus is present  Neck: Normal range of motion  Neck supple  Cardiovascular: Normal rate and regular rhythm  Pulmonary/Chest: Effort normal and breath sounds normal  No stridor  No respiratory distress  He has no wheezes  Abdominal: Soft  He exhibits distension  There is no tenderness  +tympanic   Musculoskeletal: Normal range of motion  Neurological: He is alert and oriented to person, place, and time  Skin: Skin is warm  He is not diaphoretic    +jaundice   Psychiatric: He has a normal mood and affect       Lab Results:   CBC:   Lab Results   Component Value Date    WBC 43 88 (HH) 12/28/2019    HGB 9 2 (L) 12/28/2019    HCT 27 (L) 12/28/2019    MCV 87 12/28/2019     (H) 12/28/2019    MCH 27 3 12/28/2019    MCHC 31 3 (L) 12/28/2019    RDW 22 0 (H) 12/28/2019    MPV 9 6 12/28/2019    NRBC 0 12/28/2019   , CMP:   Lab Results   Component Value Date    CO2 12 (L) 12/28/2019    GLUCOSE 95 12/28/2019    EGFR 16 12/28/2019   , Coagulation:   Lab Results   Component Value Date    INR  12/28/2019      Comment:      Unable to calculate    , Urinalysis: No results found for: Georjean Shall, SPECGRAV, PHUR, LEUKOCYTESUR, NITRITE, PROTEINUA, GLUCOSEU, KETONESU, BILIRUBINUR, BLOODU, Amylase: No results found for: AMYLASE, Lipase:   Lab Results   Component Value Date    LIPASE 514 (H) 12/28/2019     Imaging:     XR chest portable ICU    (Results Pending)

## 2019-12-29 NOTE — H&P
H&P Exam - [address] [facility] Susan 34 y o  male MRN: [id number]  Unit/Bed#: MICU 06 Encounter: [id number]      -------------------------------------------------------------------------------------------------------------  Chief Complaint: Altered mental status    History of Present Illness   HX and PE limited by: Altered mental status  Jackson Alvarado is a 34 y o  male who presents as a transfer from [facility]  Patient initially presented to [facility] on 12/28 with chief complaint of altered mental status  Per the patient's mother, he was brought in secondary to worsening confusion and the fact that he did not have any urine output over the prior 12 hours  The patient has hepatocellular carcinoma initially diagnosed in 2013  He has since been on chemotherapy and most recently has been on Ramucirumab  When he arrived to the emergency department, he was found to have severe renal failure, acidosis, hyperkalemia, and anuria  After 4 L of IV fluids, patient still remained anuric  Nephrology was consulted and the decision was made to transfer the patient to [facility] for CVVHD  Prior to transfer, a CT head, chest, abdomen, and pelvis were performed  CT head was negative for any acute intracranial abnormality  CT chest abdomen and pelvis demonstrated a diffusely enlarged liver with a numeral focal low density lesions seen throughout  There are multiple moderately dilated loops of small bowel, with some relative underdistention of the distal small bowel and the colon suspicious for small-bowel obstruction  When the patient arrived to [facility], he was noted to be confused, but redirectable  A temporary HD catheter was placed in the right IJ and see CVVHD was initiated  History obtained from chart review and patient's mother  -------------------------------------------------------------------------------------------------------------  Assessment and Plan:    Neuro:    Diagnosis:  Hepatic encephalopathy  o Plan:   o Ammonia 186  o Lactulose enema  o Frequent neuro checks  o Daily CAM-ICU    CV:    Diagnosis:  Shock likely secondary to liver failure , elevated troponin, wide complex tachycardia  o Plan:  o Map goal greater than 65  o Titrate Levophed  o Vasopressin  o Fluid resuscitation  o Trend troponin    Pulm:   Diagnosis:  No active issues  o Plan:  Maintain oxygen saturation greater than 92%    GI:    Diagnosis:  Hepatocellular carcinoma, chronic liver disease, possible small-bowel obstruction  o Plan:  Status post chemotherapy, currently on Ramucirumab  o Red surgery consulted  o Patient states he is still passing gas with occasional bowel movements  o NG tube if vomiting       :    Diagnosis:  AZALIA, severe metabolic/lactic acidosis  o Plan:   o CVVHD  o Fluid resuscitation   o Heart catheter to monitor I's and O's      F/E/N:   · Diagnosis: Hyponatremia, hyperkalemia   Plan:    Monitor electrolytes and replete per CVVHD   NPO      Heme/Onc:    Diagnosis: Hepatocellular carcinoma, Elevated INR  o Plan: Currently on Ramucirumab  o Consider kcentra or vitamin K  o SCDs for DVT prophylaxis      Endo:    Diagnosis: Hypoglycemia  o Plan: D5 normal saline  o Continue to monitor  o Hypoglycemia protocol      ID:    Diagnosis: Hypothermia, leukocytosis  o Plan: No obvious source of infection  o Blood cultures pending  o Trend lactic acid        MSK/Skin:    Diagnosis: No active issues  o Plan: Frequent repositioning, monitor for signs of breakdown  o PT and OT when stable      Disposition: Continue Critical Care   Code Status: Level 3 - DNAR and DNI  --------------------------------------------------------------------------------------------------------------  Review of Systems  He denies any chest pain, shortness of breath, nausea, vomiting, or diarrhea  He has some mild right-sided abdominal pain  A 12-point, complete review of systems was reviewed and negative except as stated above     Physical Exam   Constitutional: He appears ill  HENT:   Head: Normocephalic and atraumatic  Right Ear: External ear normal    Left Ear: External ear normal    Mouth/Throat: Oropharynx is clear and moist    Eyes: Pupils are equal, round, and reactive to light  EOM are normal  Right conjunctiva has a hemorrhage  Left conjunctiva has a hemorrhage  Scleral icterus is present  Neck: Normal range of motion  Neck supple  Cardiovascular: Normal rate, regular rhythm, normal heart sounds and intact distal pulses  No murmur heard  Pulmonary/Chest: Effort normal and breath sounds normal  No stridor  No respiratory distress  He has no wheezes  Abdominal: Soft  Bowel sounds are normal  He exhibits no distension  There is tenderness in the right upper quadrant and right lower quadrant  There is no rebound and no guarding  Musculoskeletal: Normal range of motion  He exhibits edema  He exhibits no tenderness or deformity  1+ pitting edema in the bilateral lower extremities   Neurological: No cranial nerve deficit or sensory deficit  GCS eye subscore is 3  GCS verbal subscore is 4  GCS motor subscore is 6  Patient appears somnolent, he is alert to person and time but not place  He answers questions appropriately, but occasionally confused  Asterixis noted  Skin: Skin is warm, dry and intact  Diffuse jaundice   Nursing note and vitals reviewed  --------------------------------------------------------------------------------------------------------------  Historical Information   No past medical history on file    Past Surgical History:   Procedure Laterality Date    TOOTH EXTRACTION       Social History   Social History     Substance and Sexual Activity   Alcohol Use No     Social History     Substance and Sexual Activity   Drug Use No Social History     Tobacco Use   Smoking Status Never Smoker   Smokeless Tobacco Never Used     Family History:   Family History   Problem Relation Age of Onset    Diabetes Father     Breast cancer Maternal Grandmother     Breast cancer Paternal Grandmother     Ovarian cancer Paternal Grandmother     Stomach cancer Family      I have reviewed this patient's family history and commented on sigificant items within the HPI    Vitals: There were no vitals filed for this visit  Temp  Min: 94 6 °F (34 8 °C)  Max: 94 6 °F (34 8 °C)        There is no height or weight on file to calculate BMI  Medications:  Current Facility-Administered Medications   Medication Dose Route Frequency    chlorhexidine (PERIDEX) 0 12 % oral rinse 15 mL  15 mL Swish & Spit Q12H [address]    Ketamine HCl 50 mg/5 mL **ADS Override Pull**        lactulose retention enema 200 g  200 g Rectal Once    NxStage K 0/Ca 3 dialysis solution (RFP-402) 20,000 mL  20,000 mL Dialysis Continuous    phytonadione (AQUA-MEPHYTON) 10 mg/mL 10 mg in sodium chloride 0 9 % 50 mL IVPB  10 mg Intravenous Once     Home medications:  Prior to Admission Medications   Prescriptions Last Dose Informant Patient Reported? Taking?    Lenvatinib Mesylate (LENVIMA 12 MG DAILY DOSE PO)   Yes No   Sig: Take by mouth   ibuprofen (MOTRIN) 200 mg tablet   No No   Sig: Take 1 tablet (200 mg total) by mouth every 6 (six) hours as needed for mild pain   Patient not taking: Reported on 12/28/2019      Facility-Administered Medications: None     Allergies:  No Known Allergies      Laboratory and Diagnostics:  Results from last 7 days   Lab Units 12/28/19  2330 12/28/19  2145 12/28/19  2144   WBC Thousand/uL  --   --  43 88*   HEMOGLOBIN g/dL  --   --  10 2*   I STAT HEMOGLOBIN g/dl 9 2* 11 9*  --    HEMATOCRIT %  --   --  32 6*   HEMATOCRIT, ISTAT % 27* 35*  --    PLATELETS Thousands/uL  --   --  811*   BANDS PCT %  --   --  10*   MONO PCT %  --   --  7     Results from last 7 days   Lab Units 12/28/19 2330 12/28/19 2145   CO2, I-STAT mmol/L 12* 12*   AGAP mmol/L 24* 22*     Results from last 7 days   Lab Units 12/28/19 2144   PHOSPHORUS mg/dL 17 3*      Results from last 7 days   Lab Units 12/28/19 2144   PTT seconds 83*      Results from last 7 days   Lab Units 12/28/19 2144   TROPONIN I ng/mL 0 21*     Results from last 7 days   Lab Units 12/29/19  0008 12/28/19 2144   LACTIC ACID mmol/L 8 4* 9 4*     ABG:    VBG:  Results from last 7 days   Lab Units 12/28/19 2144   PH BRENDA  7 135*   PCO2 BRENDA mm Hg 27 2*   PO2 BRENDA mm Hg 33 9*   HCO3 BRENDA mmol/L 8 9*   BASE EXC BRENDA mmol/L -18 7                 Imaging: I have personally reviewed pertinent reports  ------------------------------------------------------------------------------------------------------------------------------------------------------------------------------------------------------------------------  Anticipated Length of Stay is > 2 midnights    Counseling / Coordination of Care  Total Critical Care time spent 45 minutes excluding procedures, teaching and family updates  Jackelyn Watson MD        Portions of the record may have been created with voice recognition software  Occasional wrong word or "sound a like" substitutions may have occurred due to the inherent limitations of voice recognition software    Read the chart carefully and recognize, using context, where substitutions have occurred

## 2019-12-29 NOTE — CONSULTS
Consultation - Palliative and Supportive Care   Suraj Davis 34 y o  male [id number]      IDENTIFICATION:  Inpatient consult to Palliative Care  Consult performed by: Felicia Lennox, MD  Consult ordered by: Arnoldo Valdes DO        Physician Requesting Consult: Matt Borrero DO  Reason for Consult / Principal Problem: assistance with goals setting  Hx and PE limited by: encephalopathy    HISTORY OF PRESENT ILLNESS:       Kristi Garcia is a 34 y o  male with PMHx significant for hepatocellular carcinoma (Dx in 2013) s/p chemo and currently on Ramucirumab, who us currently admitted with severe anuric renal failure, acidosis and hyperkalemia  PCS is consulted to help clarify goals and provide support as hospitalization evolves  He was admitted overnight 12/28 from [facility] where he initially presented from home with altered mental status and no urine output for maybe 12 hours  A CT head and CAP were down that showed no acute intracranial abnormality, presence of diffuse enlarged liver with lesions throughout, and findings suggestive of small tay obstruction  He was assessed by nephrology there who recommended transfer to [facility] for the initiation of CVVHD  He has since received a temporary HD cath and CVVHD has been initiated  Other significant findings include hypotension/shock, hyperkalemia of 6 7, hyponatremia of 321, gap metabolic acidosis, liver failure with AST/ALT/ALP/TBili of 13,166/2338/1521/32 51, hypoglycemia with BG of 39, elevated creatinine 4 56 (was 0 94 in October), ammonia of 186, uptrending lactic acid of 10 from 9 4, leukocytosis of 43K, thrombocytosis 600K  He was admitted to the ICU for the initiation of vasopressors, fluids, and CVVHD  Met with the family - mom, dad and sister - at bedside and introduced myself and our service  We spoke about Pepito's medical history including hepatocellular carcinoma first diagnose din 2013   We spoke about the different treatments he sought and received - including clinical trials, etc  We spoke about how he was just jogging a month ago  He was not doing well on Corky chantelle - he was apparently more sleepy and lethargic  This continued until last night when he became acutely confused  He was willing to come to the hospital for treatments  His mom describes him as a fighter and somebody who will give themselves all the chances in the world to try to survive  Hosea Christy is not  nor does he have any children  Her mother asked who would be his POA if ever and we spoke about that per [place] Act 169, if there is no designated POA, then Pepito's next of kin will be the HCR  In his case, it would be his parents  We spoke about how palliative care will be available as another layer of support as his hospitalization evolves  Review of Systems   Unable to perform ROS: patient unresponsive (moans occasionally but appears largely comfortable)       No past medical history on file    Past Surgical History:   Procedure Laterality Date    TOOTH EXTRACTION       Social History     Socioeconomic History    Marital status: Single     Spouse name: Not on file    Number of children: Not on file    Years of education: Not on file    Highest education level: Not on file   Occupational History    Not on file   Social Needs    Financial resource strain: Not on file    Food insecurity:     Worry: Not on file     Inability: Not on file    Transportation needs:     Medical: Not on file     Non-medical: Not on file   Tobacco Use    Smoking status: Never Smoker    Smokeless tobacco: Never Used   Substance and Sexual Activity    Alcohol use: No    Drug use: No    Sexual activity: Not on file   Lifestyle    Physical activity:     Days per week: Not on file     Minutes per session: Not on file    Stress: Not on file   Relationships    Social connections:     Talks on phone: Not on file     Gets together: Not on file     Attends Yazidism service: Not on file     Active member of club or organization: Not on file     Attends meetings of clubs or organizations: Not on file     Relationship status: Not on file    Intimate partner violence:     Fear of current or ex partner: Not on file     Emotionally abused: Not on file     Physically abused: Not on file     Forced sexual activity: Not on file   Other Topics Concern    Not on file   Social History Narrative    Not on file     Family History   Problem Relation Age of Onset    Diabetes Father     Breast cancer Maternal Grandmother     Breast cancer Paternal Grandmother     Ovarian cancer Paternal Grandmother     Stomach cancer Family        MEDICATIONS / ALLERGIES:    all current active meds have been reviewed    No Known Allergies    OBJECTIVE:    Physical Exam  Physical Exam   Constitutional: He appears well-developed and well-nourished  No distress  HENT:   Head: Normocephalic and atraumatic  Right Ear: External ear normal    Left Ear: External ear normal    Eyes: Right eye exhibits no discharge  Left eye exhibits no discharge  Icteric sclera   Cardiovascular: Normal rate, regular rhythm and intact distal pulses  No murmur heard  Pulmonary/Chest: Effort normal  No respiratory distress  Abdominal: Soft  He exhibits no distension  Musculoskeletal: He exhibits no edema  Neurological:   Largely unresponsive, moans occasionally   Skin: He is not diaphoretic    jaundiced   Psychiatric:   Not agitated       Lab Results: I have personally reviewed pertinent labs  as noted in the HPI  Imaging Studies: I have personally reviewed pertinent reports  as noted in the HPI  EKG, Pathology, and Other Studies: I have personally reviewed pertinent reports     as noted in the HPI      Assessment:  Hepatocellular carcinoma s/p chemo, currently on Ramucirumab  Anuric renal failure, now on CVVHD  Hyperkalemia, Hyponatremia  Small bowel obstruction  Hypotension/Shock, on pressors  Liver failure  Hepatorenal syndrome  Hepatic encephalopathy  Goals of care    Plan:  1  Symptom management    - per ICU team    2  Goals    - Patient demonstrates good insight and judgement into his medical condition  He seems competent on my exam today  - Power of :  presumed to be mother by [place] 169   - Expressed goal: continue current supportive cares short of intubation and CPR   - Code Status: DNAR/DNI - Level 3    3  Psychosocial support   - assumed the active listener role   - provided assurance that palliative care will be available for support     We appreciate the invitation to be involved in this patient's care  We will continue to follow  Please do not hesitate to reach our on call provider through our clinic answering service at  should you have acute symptom control concerns  Counseling / Coordination of Care  Total floor / unit time spent today 60 minutes  Greater than 50% of total time was spent with the patient and / or family counseling and / or coordination of care  A description of the counseling / coordination of care: provided medical updates, discussed palliative care, discussed hospice care, determined capacity/competency, determined goals of care, determined POA, determined social/family support, discussed plans of care, discussed symptom management, provided psychosocial support            Jennifer Verde MD  Palliative Medicine & Supportive Care  Internal Medicine  Available via [facility] Text  Office: [phone]  Fax: [phone]

## 2019-12-29 NOTE — ED ATTENDING ATTESTATION
12/28/2019  IRiley DO, saw and evaluated the patient  I have discussed the patient with the resident/non-physician practitioner and agree with the resident's/non-physician practitioner's findings, Plan of Care, and MDM as documented in the resident's/non-physician practitioner's note, except where noted  All available labs and Radiology studies were reviewed  I was present for key portions of any procedure(s) performed by the resident/non-physician practitioner and I was immediately available to provide assistance  At this point I agree with the current assessment done in the Emergency Department  I have conducted an independent evaluation of this patient a history and physical is as follows:    Patient presents to the emergency department for worsening fatigue, back pain, lower abdominal pain and lethargy and confusion that worsened over the last 24 hours  Patient has a history of liver cancer  EMS was called tonight for his worsening mental status  Patient was alert and oriented for EMS but was found to be in a wide complex tachycardia  We did speak with them via command phone  Patient was stable without chest pain, alteration of his mental status or hypotension so no intervention was needed at that time  Patient arrives in the emergency department with persistent wide complex tachycardia  Patient still denying any chest pain or shortness of breath  Patient last underwent chemo about 3 weeks ago  Patient has no other complaints other than worsening fatigue over the past week with low back pain and lower abdominal discomfort  He does tell me that his urinary output has dramatically decreased over the past 24 hours  He has only urinated once in the past 12 hours and it is been extremely dark  Patient appears ill and sick on exam   He is tachycardic but in no respiratory distress  His lungs are clear to auscultation bilaterally    Abdomen is soft without reproducible tenderness on my exam   Skin is jaundiced and he has scleral icterus  He is alert and oriented x3  He has 3+ pitting edema to the lower extremities which she states is more than normal     Stat Chem 8 panel drawn for the possibility of hyperkalemia given his new onset of wide complex tachycardia  Chem 8 does show a potassium of 7 5 with a creatinine of 5  Patient had labs done on December 19th at [facility] which showed a normal creatinine and potassium level  Immediate treatment for hyperkalemia started  Will discuss with Nephrology  ED Course    10:03 PM  Nephrology paged    10:15 PM  Spoke with Dr Fahad Dixon with Nephrology  We reviewed his findings on his Chem 8  The official CMP is still pending  He agrees with initial treatment  We discussed emergent dialysis for which she agrees with  I will send him a message when the formal CMP returns  I will discuss the case with ICU to try to arrange admission  Patient is becoming more intermittently hypotensive  IV fluids are running  Temperature is low so will warm the IV fluids  Symptoms could be consistent with a tumor lysis syndrome, sepsis or just worsening extension of his underlying cancer  White blood cell count is over 40,000 and the patient is hypothermic so sepsis is a possibility although there is no source at this time  I feel that his overlying presentation is due to his underlying cancer and probable tumor lysis syndrome  Mental status is starting to decline  Will give another amp of dextrose  Spoke with the ICU  Unsure if we are able to dialyze the patient here for lack of a quit min and nursing staff  Formal CMP is still pending  Patient's wide complex tachycardia is persisting so will give another treatment of calcium and bicarb  Patient's QRS is now normal after 2nd treatment  Nephrology is now here evaluating the patient    We are unable to dialyze the patient here so we will need to emergently transfer to [facility]  [facility]  Resident spoke with ICU, Dr Moises Bhatt who accepts the admission  He is asking for CT scans given his unmeasurable INR for the possibility of underlying spontaneous hemorrhage  Patient remains critical but much improved from initial evaluation  QRS remains normal now after 2nd intervention  Patient being transported emergently at this time  Advised transport to give another dose of calcium and bicarb if QRS widened  Will continue with IV fluids for blood pressure support  I also explained to them that he has been intermittently hypoglycemic and to push dextrose if his mental status worsens in addition to the other meds        Critical Care Time  CriticalCare Time  Performed by: Lashell Berkowitz DO  Authorized by: Lashell Berkowitz DO     Critical care provider statement:     Critical care time (minutes):  120    Critical care time was exclusive of:  Separately billable procedures and treating other patients and teaching time    Critical care was necessary to treat or prevent imminent or life-threatening deterioration of the following conditions:  Renal failure, sepsis, shock, hepatic failure and metabolic crisis    Critical care was time spent personally by me on the following activities:  Blood draw for specimens, obtaining history from patient or surrogate, development of treatment plan with patient or surrogate, discussions with consultants, evaluation of patient's response to treatment, examination of patient, interpretation of cardiac output measurements, ordering and performing treatments and interventions, ordering and review of laboratory studies, ordering and review of radiographic studies, review of old charts and re-evaluation of patient's condition    I assumed direction of critical care for this patient from another provider in my specialty: no

## 2019-12-29 NOTE — CONSULTS
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 TMA secondary to Ramucirumab (VEGF inhibitor) vs recent use of Nivolumab (from 10/16 to 12/4/19) can cause immune mediated nephritis (check point inhibitor) vs Abemaciclib (from 10/16 to 12/4/19) can cause increase in creatinine vs rule out tumor lysis syndrome  -bladder scan nonsignificant  -status post 4 L IV fluid bolus in the ER without any significant urine output  -given severe renal failure, acidosis, life-threatening hyperkalemia, anuria, severe lactic acidosis, will start patient on emergent CRRT  -patient is [facility] and will initiate CVVHD over there   -I had detailed discussion with patient's mother regarding starting dialysis and risks versus benefits were explained    She verbalized understanding of my discussion with her and has provided consent which is in the chart   -avoid any further chemotherapy for time being  -avoid other nephrotoxins or NSAIDs  -follow results for CMP including calcium, phosphorus, uric acid level   -consider Heart catheter, when urine sample is available, check urinalysis microscopy, urine sodium, urine chloride, urine creatinine  Severe life-threatening hyperkalemia  -serum potassium initial 7 5 slightly improved to 6 6 with medical management   -will start emergent CVVHD with 0 K bath and close monitoring of serum potassium, one serum potassium improves and less than 5 5, will change to two K bath and one serum potassium less than four, will change to 4K bath  -continue to closely monitor potassium, status post medical management in the ER  -initial white complex tachycardia overall improved with calcium gluconate and medical management  -strict low-potassium diet    Severe hyponatremia, serum sodium initially 119 rapidly improved to 129 based on istat  -avoid rapid correction and no more than 8 to 10 mEq correction in 24 hours  -status post 4 L IV normal saline bolus in the ER  Consider starting hypotonic IV fluid with IV D5 water to maintain serum sodium in mid 120s for time being  -monitor on dialysis closely    Severe metabolic/lactic acidosis  -lactic acid level 9 4 likely in the setting of hypotension, liver dysfunction, malignancy  -plan to start emergency CVVHD as mentioned above  -continue to trend lactic acid level    Altered mental status, concern for hepatic encephalopathy versus rule out sepsis, ammonia level 186  Closely monitor on dialysis, further management as per ICU team       Discussed above plan in detail with Dr Terrie Núñez from ER and ICU team     HISTORY OF PRESENT ILLNESS:  Requesting Physician: Renetta Gallegos DO  Reason for Consult: AKI Georgia Schilder is a 34y o  year old male who was admitted to [place] [id number] after presenting with altered mental status  A renal consultation is requested today for assistance in the management of [address]  records were reviewed  Patient's baseline serum creatinine 0 9 earlier this month in December 2019    He was found to have overall worsening confusion and was brought to the hospital for further evaluation when he was found to have severe acidosis, hyperkalemia, severe renal failure  Patient did not have any urine output over last 12 hours prior coming to hospital   Most of history is obtained from patient's mother was present at bedside, patient is overall confused and does not provide much history  Also part of the history is obtained from reviewing medical records  No recent NSAID exposure  Patient has been getting more and more confused and somewhat lethargic lately  He has very complicated hepatocellular carcinoma course as mentioned above  Upon arrival to the emergency room, patient has not voided urine, bladder scan nonsignificant  He was also found to have wide complex tachycardia  PAST MEDICAL HISTORY:  No past medical history on file      PAST SURGICAL HISTORY:  Past Surgical History:   Procedure Laterality Date    TOOTH EXTRACTION         ALLERGIES:  No Known Allergies    SOCIAL HISTORY:  Social History     Substance and Sexual Activity   Alcohol Use No     Social History     Substance and Sexual Activity   Drug Use No     Social History     Tobacco Use   Smoking Status Never Smoker   Smokeless Tobacco Never Used       FAMILY HISTORY:  Family History   Problem Relation Age of Onset    Diabetes Father     Breast cancer Maternal Grandmother     Breast cancer Paternal Grandmother     Ovarian cancer Paternal Grandmother     Stomach cancer Family        MEDICATIONS:    Current Facility-Administered Medications:     calcium gluconate 1 g in sodium chloride 0 9% 50 mL (premix), 1 g, Intravenous, Once, [employer] , DO, Last Rate: 100 mL/hr at 12/28/19 2315, 1 g at 12/28/19 2315    furosemide (LASIX) injection 20 mg, 20 mg, Intravenous, Once, Bharathi Armas MD, Stopped at 12/28/19 2204    hydrocortisone sodium succinate (PF) (Solu-CORTEF) injection 100 mg, 100 mg, Intravenous, Once, Bharathi Armas MD    sodium chloride 0 9 % bolus 1,000 mL, 1,000 mL, Intravenous, Once, Mai Rosario Meaghan Arenas MD, Last Rate: 500 mL/hr at 12/28/19 2238, 1,000 mL at 12/28/19 2238    vancomycin (VANCOCIN) 1,500 mg in sodium chloride 0 9 % 250 mL IVPB, 15 mg/kg, Intravenous, Once, Elias Henriquez MD, Last Rate: 166 7 mL/hr at 12/28/19 2314, 1,500 mg at 12/28/19 2314    Current Outpatient Medications:     ibuprofen (MOTRIN) 200 mg tablet, Take 1 tablet (200 mg total) by mouth every 6 (six) hours as needed for mild pain (Patient not taking: Reported on 12/28/2019), Disp: , Rfl:     Lenvatinib Mesylate (LENVIMA 12 MG DAILY DOSE PO), Take by mouth, Disp: , Rfl:     REVIEW OF SYSTEMS:  More than 10 review of systems were attempted although overall review of system remains somewhat limited as patient is confused  No other pertinent positive findings other than mentioned in the note      PHYSICAL EXAM:  Current Weight: Weight - Scale: 95 3 kg (210 lb 1 6 oz)  First Weight: Weight - Scale: 95 3 kg (210 lb 1 6 oz)  Vitals:    12/28/19 2305   BP: (!) 97/48   Pulse: (!) 144   Resp: 22   Temp:    SpO2: 98%       Intake/Output Summary (Last 24 hours) at 12/28/2019 2335  Last data filed at 12/28/2019 2306  Gross per 24 hour   Intake 2100 ml   Output    Net 2100 ml     Wt Readings from Last 3 Encounters:   12/28/19 95 3 kg (210 lb 1 6 oz)   01/31/19 93 9 kg (207 lb)   06/16/16 106 kg (234 lb 2 oz)     Temp Readings from Last 3 Encounters:   12/28/19 (!) 94 6 °F (34 8 °C) (Rectal)   01/31/19 98 4 °F (36 9 °C)   06/16/16 98 °F (36 7 °C)     BP Readings from Last 3 Encounters:   12/28/19 (!) 97/48   01/31/19 122/81   06/16/16 110/70     Pulse Readings from Last 3 Encounters:   12/28/19 (!) 144   01/31/19 69   06/16/16 76        Physical Examination:  General:  Sitting in chair, no acute distress  Eyes:  Mild conjunctival pallor present, sclerae icteric  ENT:  External examination of ears and nose unremarkable  Neck:  No obvious lymphadenopathy appreciated  Respiratory:  Bilateral air entry present, no crackles appreciated  CVS:  S1, S2 present  GI:  Soft, non distended, slightly tender  CNS:  Overall sleepy, drowsy, opens eyes, able to tell me his name, oriented x2  Extremities:  Trace to 1+ edema in both legs  Psych:  Overall confused  Skin:  No new rash in legs, jaundiced skin    Invasive Devices:      Lab Results:   Results from last 7 days   Lab Units 12/28/19  2330 12/28/19  2145 12/28/19  2144   WBC Thousand/uL  --   --  43 88*   HEMOGLOBIN g/dL  --   --  10 2*   I STAT HEMOGLOBIN g/dl 9 2* 11 9*  --    HEMATOCRIT %  --   --  32 6*   HEMATOCRIT, ISTAT % 27* 35*  --    PLATELETS Thousands/uL  --   --  811*   CO2, I-STAT mmol/L 12* 12*  --    GLUCOSE, ISTAT mg/dl 95 40*  --        Other Studies:   CT head without contrast    (Results Pending)   CT chest without contrast    (Results Pending)   CT scan of chest, abdomen, pelvis, results to follow  Portions of the record may have been created with voice recognition software  Occasional wrong word or "sound a like" substitutions may have occurred due to the inherent limitations of voice recognition software  Read the chart carefully and recognize, using context, where substitutions have occurred

## 2019-12-29 NOTE — PROGRESS NOTES
Pastoral Care Progress Note    2019  Patient: Stevie Harada : 1990  Admission Date & Time: 2019 0039  MRN: [id number] [facility]: [id number]                     Chaplaincy Interventions Utilized:   Empowerment: Clarified, confirmed, or reviewed information from treatment team     Exploration: Explored emotional needs & resources and Explored spiritual needs & resources    Collaboration: Advocated for patient/family    Relationship Building: Listened empathically, Provided relationship counseling and Provided silent and supportive presence    Ritual: Celebrated with patient/family, Joe Wiggins provided sacrament of the sick and Provided Rastafari resources            Chaplaincy Outcomes Achieved:  Emotional resources utilized and Expressed gratitude          Spiritual Coping Strategies Utilized:   Spiritual comfort and Spiritual gratitude

## 2019-12-29 NOTE — ED PROVIDER NOTES
History  Chief Complaint   Patient presents with    Abdominal Pain     increase in weakness, fatigue and lower abdominal pain for a week  Per  pt's mother pt was more confused tonight  Pt alert and oriented x4 on arrival     Lethargy     This is a 34yo male with a PMHx of hepatocellular carcinoma and hepatoblastoma diagnosed in 2013 on ramucirumab (cyramza) who presents to the ED this evening via EMS with altered mental status and inability to void for the past 12h  Patient's mom provides the majority of the history as the patient is lethargic but arousable  Patient's mom states that he had been doing really well and was even jogging a few weeks ago and going to [place] without any jaundice but started to feel ill on Christmas Eve and he declined from there to the point where he is today when he became acutely confused and has not urinated since noon  Patient is taking ramucirumab and mom believes that his last treatment was 1-2 weeks ago  Patient is able to note that he feels a little pain in his abdomen but denies pain elsewhere  Bedside ultrasound showed that the patient was not retaining and only had about 100ml of fluid in his bladder  Initial istat showed his K+ to be 7 5 and his EKG appeared sinusoidal  He was immediately started on Calcium gluconate 1g (later got a 2nd dose), albuterol nebulizer, regular insulin 10U IV with d50 x2, and sodium bicarb  Patient's temperature was then found to be 94 6F rectally and he was started on warm IVF and sepsis labs were drawn  Prior to Admission Medications   Prescriptions Last Dose Informant Patient Reported? Taking?    Lenvatinib Mesylate (LENVIMA 12 MG DAILY DOSE PO) Not Taking at Unknown time  Yes No   Sig: Take by mouth   ibuprofen (MOTRIN) 200 mg tablet Not Taking at Unknown time  No No   Sig: Take 1 tablet (200 mg total) by mouth every 6 (six) hours as needed for mild pain   Patient not taking: Reported on 12/28/2019      Facility-Administered Medications: None       No past medical history on file  Past Surgical History:   Procedure Laterality Date    TOOTH EXTRACTION         Family History   Problem Relation Age of Onset    Diabetes Father     Breast cancer Maternal Grandmother     Breast cancer Paternal Grandmother     Ovarian cancer Paternal Grandmother     Stomach cancer Family      I have reviewed and agree with the history as documented  Social History     Tobacco Use    Smoking status: Never Smoker    Smokeless tobacco: Never Used   Substance Use Topics    Alcohol use: Never     Frequency: Never     Binge frequency: Never     Comment: n/a    Drug use: No        Review of Systems   Constitutional: Positive for fatigue  Negative for chills, diaphoresis and fever  HENT: Negative for congestion, rhinorrhea, sinus pressure and sore throat  Eyes: Negative for visual disturbance  Respiratory: Negative for cough, chest tightness and shortness of breath  Cardiovascular: Negative for chest pain  Gastrointestinal: Positive for abdominal pain  Negative for constipation, diarrhea, nausea and vomiting  Genitourinary: Negative for dysuria, frequency, hematuria and urgency  Musculoskeletal: Negative for arthralgias and myalgias  Skin: Positive for color change  Negative for rash  Neurological: Positive for weakness  Negative for dizziness, numbness and headaches  Psychiatric/Behavioral: Positive for confusion         Physical Exam  ED Triage Vitals   Temperature Pulse Respirations Blood Pressure SpO2   12/28/19 2207 12/28/19 2142 12/28/19 2142 12/28/19 2142 12/28/19 2142   (!) 94 6 °F (34 8 °C) (!) 120 20 (!) 97/46 100 %      Temp Source Heart Rate Source Patient Position - Orthostatic VS BP Location FiO2 (%)   12/28/19 2207 12/28/19 2142 12/28/19 2142 12/28/19 2142 --   Rectal Monitor Lying Right arm       Pain Score       12/28/19 2142       6             Orthostatic Vital Signs  Vitals:    12/28/19 2252 12/28/19 2300 12/28/19 2305 12/29/19 0009   BP: (!) 84/59 (!) 88/58 (!) 97/48 102/61   Pulse: (!) 121 102 (!) 144 (!) 106   Patient Position - Orthostatic VS: Lying Lying Lying Lying       Physical Exam   Constitutional: He is oriented to person, place, and time  He appears well-developed and well-nourished  No distress  HENT:   Head: Normocephalic and atraumatic  Eyes: Pupils are equal, round, and reactive to light  Conjunctivae are normal    Neck: Normal range of motion  Neck supple  No JVD present  Cardiovascular: Normal rate, regular rhythm and normal heart sounds  Exam reveals no gallop and no friction rub  No murmur heard  Pulmonary/Chest: Effort normal and breath sounds normal  No stridor  No respiratory distress  He has no wheezes  He has no rales  Abdominal: Soft  Bowel sounds are normal  He exhibits no distension  There is tenderness in the periumbilical area  There is no guarding  Musculoskeletal: Normal range of motion  He exhibits no edema, tenderness or deformity  Neurological: He is alert and oriented to person, place, and time  No cranial nerve deficit or sensory deficit  He exhibits normal muscle tone  Patient believes he is in [place] ED but states that all EDs look the same and that's typically where he goes for care  Skin: Skin is warm and dry  No rash noted  He is not diaphoretic  No erythema  No pallor  Severe jaundice   Psychiatric: He has a normal mood and affect  His behavior is normal    Nursing note and vitals reviewed        ED Medications  Medications   sodium chloride 0 9 % bolus 1,000 mL (0 mL Intravenous Stopped 12/28/19 2232)   calcium gluconate 1 g in sodium chloride 0 9% 50 mL (premix) (0 g Intravenous Stopped 12/28/19 2227)   insulin regular (HumuLIN R,NovoLIN R) injection 10 Units (10 Units Subcutaneous Given 12/28/19 2152)   dextrose 50 % IV solution 25 mL (25 mL Intravenous Given 12/28/19 2157)   albuterol inhalation solution 5 mg (5 mg Nebulization Given 12/28/19 2151) sodium chloride 0 9 % bolus 1,000 mL (0 mL Intravenous Stopped 12/28/19 2303)   sodium bicarbonate 8 4 % injection 50 mEq (50 mEq Intravenous Given 12/28/19 2222)   dextrose 50 % IV solution 50 mL (50 mL Intravenous Given 12/28/19 2225)   sodium chloride 0 9 % bolus 1,000 mL (0 mL Intravenous Stopped 12/28/19 2345)   cefepime (MAXIPIME) 2 g/50 mL dextrose IVPB (0 mg Intravenous Stopped 12/28/19 2306)   calcium gluconate 1 g in sodium chloride 0 9% 50 mL (premix) (0 g Intravenous Stopped 12/28/19 2345)   sodium bicarbonate 8 4 % injection 50 mEq (50 mEq Intravenous Given 12/28/19 2309)   hydrocortisone sodium succinate (PF) (Solu-CORTEF) injection 100 mg (100 mg Intravenous Given 12/28/19 2345)       Diagnostic Studies  Results Reviewed     Procedure Component Value Units Date/Time    Blood culture #2 [[id number]]  (Abnormal)  (Susceptibility) Collected:  12/28/19 2230    Lab Status:  Final result Specimen:  Blood from Arm, Right Updated:  01/01/20 1151     Blood Culture Enterococcus faecalis     Gram Stain Result Gram positive cocci in pairs and chains    Susceptibility     Enterococcus faecalis (1)     Antibiotic Interpretation Microscan Method Status    ZID Performed  Yes LA Final                   Blood culture #1 [[id number]]  (Abnormal)  (Susceptibility) Collected:  12/28/19 2246    Lab Status:  Final result Specimen:  Blood from Arm, Right Updated:  12/31/19 1302     Blood Culture Enterococcus faecalis     Gram Stain Result Gram positive cocci in pairs and chains    Susceptibility     Enterococcus faecalis (1)     Antibiotic Interpretation Microscan Method Status    ZID Performed  Yes  LA Final    Ampicillin ($$) Susceptible <=2 00 ug/ml LA Final    Gentamicin Synergy Susceptible <=500 ug/ml LA Final    Streptomycin Synergy Susceptible <=1,000 ug/ml LA Final    Vancomycin ($) Susceptible 1 00 ug/ml LA Final                   Lactic acid, plasma [[id number]]  (Abnormal) Collected:  12/29/19 0008    Lab Status:  Final result Specimen:  Blood from Arm, Left Updated:  12/29/19 0054     LACTIC ACID 8 4 mmol/L     Narrative:       Result may be elevated if tourniquet was used during collection  Uric acid [[id number]]  (Abnormal) Collected:  12/28/19 2144    Lab Status:  Final result Specimen:  Blood from Arm, Right Updated:  12/29/19 0023     Uric Acid 14 6 mg/dL     Lipase [[id number]]  (Abnormal) Collected:  12/28/19 2144    Lab Status:  Final result Specimen:  Blood from Arm, Right Updated:  12/29/19 0023     Lipase 129 u/L     Salicylate level [[id number]]  (Abnormal) Collected:  12/28/19 2144    Lab Status:  Final result Specimen:  Blood from Arm, Right Updated:  07/13/71 1128     Salicylate Lvl <3 mg/dL     Acetaminophen level-If concentration is detectable, please discuss with medical  on call   [[id number]]  (Abnormal) Collected:  12/28/19 2144    Lab Status:  Final result Specimen:  Blood from Arm, Right Updated:  12/29/19 0010     Acetaminophen Level <2 ug/mL     Phosphorus [[id number]]  (Abnormal) Collected:  12/28/19 2144    Lab Status:  Final result Specimen:  Blood from Arm, Right Updated:  12/28/19 2356     Phosphorus 17 3 mg/dL     POCT Chem 8+ [[id number]]  (Abnormal) Collected:  12/28/19 2330    Lab Status:  Final result Specimen:  Venous Updated:  12/28/19 2334     SODIUM, I-STAT 129 mmol/l      Potassium, i-STAT 6 6 mmol/L      Chloride, istat 102 mmol/L      CO2, i-STAT 12 mmol/L      Anion Gap, i-STAT 24 mmol/L      Calcium, Ionized i-STAT 0 72 mmol/L      BUN, I-STAT 52 mg/dl      Creatinine, i-STAT 4 6 mg/dl      eGFR 16 ml/min/1 73sq m      Glucose, i-STAT 95 mg/dl      Hct, i-STAT 27 %      Hgb, i-STAT 9 2 g/dl      Specimen Type VENOUS    Narrative:       National Kidney Disease Foundation guidelines for Chronic Kidney Disease (CKD):     Stage 1 with normal or high GFR (GFR > 90 mL/min/1 73 square meters)    Stage 2 Mild CKD (GFR = 60-89 mL/min/1 73 square meters)    Stage 3A Moderate CKD (GFR = 45-59 mL/min/1 73 square meters)    Stage 3B Moderate CKD (GFR = 30-44 mL/min/1 73 square meters)    Stage 4 Severe CKD (GFR = 15-29 mL/min/1 73 square meters)    Stage 5 End Stage CKD (GFR <15 mL/min/1 73 square meters)  Note: GFR calculation is accurate only with a steady state creatinine    Fingerstick Glucose (POCT) [[id number]]  (Abnormal) Collected:  12/28/19 2257    Lab Status:  Final result Updated:  12/28/19 2257     POC Glucose 246 mg/dl     CBC and differential [[id number]]  (Abnormal) Collected:  12/28/19 2144    Lab Status:  Final result Specimen:  Blood from Arm, Right Updated:  12/28/19 2242     WBC 43 88 Thousand/uL      RBC 3 74 Million/uL      Hemoglobin 10 2 g/dL      Hematocrit 32 6 %      MCV 87 fL      MCH 27 3 pg      MCHC 31 3 g/dL      RDW 22 0 %      MPV 9 6 fL      Platelets 694 Thousands/uL      nRBC 0 /100 WBCs     Protime-INR [[id number]]  (Abnormal) Collected:  12/28/19 2144    Lab Status:  Final result Specimen:  Blood from Arm, Right Updated:  12/28/19 2242     Protime >120 0 seconds      INR --    APTT [[id number]]  (Abnormal) Collected:  12/28/19 2144    Lab Status:  Final result Specimen:  Blood from Arm, Right Updated:  12/28/19 2242     PTT 83 seconds     TSH [[id number]]  (Abnormal) Collected:  12/28/19 2144    Lab Status:  Final result Specimen:  Blood from Arm, Right Updated:  12/28/19 2238     TSH 3RD GENERATON 0 010 uIU/mL     Narrative:       Patients undergoing fluorescein dye angiography may retain small amounts of fluorescein in the body for 48-72 hours post procedure  Samples containing fluorescein can produce falsely depressed TSH values  If the patient had this procedure,a specimen should be resubmitted post fluorescein clearance        Lactic acid, plasma [[id number]]  (Abnormal) Collected:  12/28/19 2144    Lab Status:  Final result Specimen:  Blood from Arm, Right Updated:  12/28/19 2216     LACTIC ACID 9 4 mmol/L     Narrative:       Result may be elevated if tourniquet was used during collection      Ethanol [[id number]]  (Normal) Collected:  12/28/19 2144    Lab Status:  Final result Specimen:  Blood from Arm, Right Updated:  12/28/19 2213     Ethanol Lvl <3 mg/dL     Troponin I [[id number]]  (Abnormal) Collected:  12/28/19 2144    Lab Status:  Final result Specimen:  Blood from Arm, Right Updated:  12/28/19 2212     Troponin I 0 21 ng/mL     Ammonia [[id number]]  (Abnormal) Collected:  12/28/19 2144    Lab Status:  Final result Specimen:  Blood from Arm, Right Updated:  12/28/19 2209     Ammonia 186 umol/L     Blood gas, venous [[id number]]  (Abnormal) Collected:  12/28/19 2144    Lab Status:  Final result Specimen:  Blood from Arm, Right Updated:  12/28/19 2154     pH, Chad 7 135     pCO2, Chad 27 2 mm Hg      pO2, Chad 33 9 mm Hg      HCO3, Chad 8 9 mmol/L      Base Excess, Chad -18 7 mmol/L      O2 Content, Chad 6 9 ml/dL      O2 HGB, VENOUS 42 6 %     POCT Chem 8+ [[id number]]  (Abnormal) Collected:  12/28/19 2145    Lab Status:  Final result Specimen:  Venous Updated:  12/28/19 2148     SODIUM, I-STAT 119 mmol/l      Potassium, i-STAT 7 5 mmol/L      Chloride, istat 94 mmol/L      CO2, i-STAT 12 mmol/L      Anion Gap, i-STAT 22 mmol/L      Calcium, Ionized i-STAT 0 71 mmol/L      BUN, I-STAT 55 mg/dl      Creatinine, i-STAT 5 0 mg/dl      eGFR 14 ml/min/1 73sq m      Glucose, i-STAT 40 mg/dl      Hct, i-STAT 35 %      Hgb, i-STAT 11 9 g/dl      Specimen Type VENOUS    Narrative:       National Kidney Disease Foundation guidelines for Chronic Kidney Disease (CKD):     Stage 1 with normal or high GFR (GFR > 90 mL/min/1 73 square meters)    Stage 2 Mild CKD (GFR = 60-89 mL/min/1 73 square meters)    Stage 3A Moderate CKD (GFR = 45-59 mL/min/1 73 square meters)    Stage 3B Moderate CKD (GFR = 30-44 mL/min/1 73 square meters)    Stage 4 Severe CKD (GFR = 15-29 mL/min/1 73 square meters)    Stage 5 End Stage CKD (GFR <15 mL/min/1 73 square meters)  Note: GFR calculation is accurate only with a steady state creatinine                 CT head without contrast   Final Result by Gillis Paget, DO (12/29 0118)      No acute intracranial abnormality is seen  Other findings as above  Workstation performed: [id number]         CT chest abdomen pelvis wo contrast   Final Result by Gillis Paget, DO (12/29 0215)   Diffusely enlarged liver with innumerable hepatic lesions, correlates with the patient's history of hepatoblastoma  Multiple moderately dilated loops of small bowel seen in the mid and lower abdomen, largest measures approximately 4 8 cm in diameter  Some fecal appearing material seen within multiple small bowel loops  There is also relative underdistention of the    distal small bowel and the colon although no discrete transition point is identified  Findings taken together are suspicious for small bowel obstruction  Small amount of fluid in the peritoneal cavity, probably reactive  Partially distended bladder  Mild circumferential bladder wall thickening noted, probably exaggerated by underdistention  Superimposed cystitis could be considered in the appropriate clinical setting  Multiple subcentimeter pulmonary nodules as described, pulmonary metastatic disease is the diagnosis of exclusion  Mild splenomegaly, body wall edema, and other findings as above        Findings discussed with Dr Ra Lincoln by Dr Siva Darden at 2:15am on 12/29/2019                  Workstation performed: [id number]               Procedures  ECG 12 Lead Documentation Only  Date/Time: 1/2/2020 12:11 AM  Performed by: Inna Stephenson MD  Authorized by: Inna Stephenson MD     Indications / Diagnosis:  AMS  ECG reviewed by me, the ED Provider: yes    Patient location:  ED  Previous ECG:     Previous ECG:  Compared to current    Similarity:  Changes noted    Comparison to cardiac monitor: Yes    Interpretation:     Interpretation: abnormal    Rate:     ECG rate assessment: normal    Rhythm:     Rhythm: other rhythm    Ectopy:     Ectopy: none    QRS:     QRS axis:  Indeterminate    QRS intervals: Wide  Conduction:     Conduction: abnormal      Abnormal conduction comment:  Sinusoidal  Comments:      Sinusoidal wave pattern from hyper-K  ECG 12 Lead Documentation Only  Date/Time: 1/2/2020 12:12 AM  Performed by: Siva Nava MD  Authorized by: Siva Nava MD     Indications / Diagnosis:  Post Hyper-K treatment  ECG reviewed by me, the ED Provider: yes    Patient location:  ED  Previous ECG:     Previous ECG:  Compared to current    Similarity:  Changes noted    Comparison to cardiac monitor: Yes    Interpretation:     Interpretation: abnormal    Rate:     ECG rate assessment: tachycardic    Rhythm:     Rhythm: junctional    Ectopy:     Ectopy: none    QRS:     QRS axis:  Right    QRS intervals:  Normal  Conduction:     Conduction: normal    ST segments:     ST segments:  Normal  T waves:     T waves: normal            ED Course                               MDM  Number of Diagnoses or Management Options  Diagnosis management comments: Patient found to be in profound shock but unclear if it was from his hepatorenal syndrome (tumor lysis syndrome?) or from sepsis  Spoke with critical care here at [address] who recommended that he be transferred to [facility] as there are no CVVH machines available and patient needs emergent dialysis  Spoke with critical care at [facility] who accepted the patient and requested CT scans prior to transfer along with 100mg of hydrocortisone IV, which were all completed prior to transfer  Disposition  Final diagnoses:   None     ED Disposition     ED Disposition Condition Date/Time Comment    Transfer to Another Facility-In Network  TYM Dec 28, 2019 11:21 PM Grabiel Payne should be transferred out to [facility]          MD Documentation      Most Recent Value   Patient Condition  The patient has been stabilized such that within reasonable medical probability, no material deterioration of the patient condition or the condition of the unborn child(shiloh) is likely to result from the transfer   Reason for Transfer  Level of Care needed not available at this facility   Benefits of Transfer  Specialized equipment and/or services available at the receiving facility (Include comment)________________________   Risks of Transfer  Potential for delay in receiving treatment   Accepting Physician  Dr Eddie Rashid Name, Heide Huerta   Sending MD Dr Sheryl Loja   Provider Certification  General risk, such as traffic hazards, adverse weather conditions, rough terrain or turbulence, possible failure of equipment (including vehicle or aircraft), or consequences of actions of persons outside the control of the transport personnel      RN Documentation      Most [address] [place] [address] Name, Heide Huerta   Transport Mode  Ambulance   Level of Care  Advanced life support      Follow-up Information    None         Discharge Medication List as of 12/29/2019 12:33 AM      CONTINUE these medications which have NOT CHANGED    Details   ibuprofen (MOTRIN) 200 mg tablet Take 1 tablet (200 mg total) by mouth every 6 (six) hours as needed for mild pain, Starting Thu 1/31/2019, No Print      Lenvatinib Mesylate (LENVIMA 12 MG DAILY DOSE PO) Take by mouth, Historical Med           No discharge procedures on file  ED Provider  Attending physically available and evaluated Dena Roberts I managed the patient along with the ED Attending      Electronically Signed by         Jared Cheek MD  01/02/20 2470

## 2019-12-30 PROBLEM — E87.1 HYPONATREMIA: Status: RESOLVED | Noted: 2019-01-01 | Resolved: 2019-01-01

## 2019-12-30 PROBLEM — E79.0 HYPERURICEMIA: Status: ACTIVE | Noted: 2019-01-01

## 2019-12-30 NOTE — PROGRESS NOTES
Procedure Note - Nephrology   Cory Davis 34 y o  male MRN: [id number]  Unit/Bed#: MICU 06 Encounter: [id number]    Procedure note-CRRT(51824)  Assessment / Plan:  1  Severe acute kidney injury, present on admission, baseline serum creatinine 0 9 and December 19, 2019  -serum creatinine 5 on admission  -Flavio  likely multifactorial in the setting of prerenal/ATN/hepatorenal syndrome versus questionable TMA in the setting of VEGF inhibitor versus recent use of Nivolumab and Abemaciclib from October 16th to December 4, 2019  Nivolumab can cause immune mediated nephritis  Could also potentially have Coumadin nephropathy vs TLS  -continue Heart to monitor IO  -patient remains in lactic acidosis  -patient seen examined by me on CVVHD today  On even UF, 2 K bath  CVVHD was started on December 29, 2019  Will continue current prescription  Would consider change in potassium bath if potassium in serum lower than 4   -consent on file which mother has provided  -would obtain urinalysis with microscopy, urine lytes if urine sample available    2  Lactic acidosis in setting of hypotension, liver dysfunction, malignancy - lactate 7 7, will avoid UF for now, likely d/t underlying HCC/hepatoblastoma and liver failure  3  Severe life-threatening hyperkalemia-potassium is significantly improved on CRRT  Will continue 2 potassium bath for now as above  Initial like complex tachycardia overall improved with cut Nexium gluconate medical management    4  Hyponatremia-serum sodium 119 on i-STAT, 126 on CMP, has resolved on CRRT    5  Anemia - hemolysis smear showed positive schistocytes, Hgb 8 1    6  Severe shock-on multiple pressors, monitoring pursue ICU    7  Altered mental status in the setting of concern for hepatic encephalopathy versus sepsis-monitor on dialysis, management per primary team    8   Hyperuricemia-uric acid level 14 6 on admission, f/u repeat uric acid    Subjective:   Patient seen and examined by me on CRRT at approximately 10:20 a m     /58, on multiple pressors  Patient making little to no urine  Objective:     Vitals: Blood pressure 135/58, pulse 80, temperature 98 1 °F (36 7 °C), resp  rate 19, weight 95 2 kg (209 lb 14 1 oz), SpO2 97 %  ,Body mass index is 24 57 kg/m²  Temp (24hrs), Av 1 °F (36 2 °C), Min:96 3 °F (35 7 °C), Max:98 6 °F (37 °C)      Weight (last 2 days)     Date/Time   Weight    19 0600   95 2 (209 88)    19 0214   95 3 (210 1)    19 0113   93 5 (206 13)                Intake/Output Summary (Last 24 hours) at 2019 1023  Last data filed at 2019 1005  Gross per 24 hour   Intake 8682 9 ml   Output 9182 ml   Net -499 1 ml     I/O last 24 hours: In: 76833 4 [I V :8498 9; IV Piggyback:2392 5]  Out: 37222 [Urine:50; Other:9890; Stool:100]        Physical Exam:   Physical Exam   Constitutional: No distress  Cachectic, jaundiced, ill appearing   HENT:   Head: Normocephalic and atraumatic  Mouth/Throat: No oropharyngeal exudate  Eyes: Right eye exhibits no discharge  Left eye exhibits no discharge  Scleral icterus is present  Neck: Neck supple  No thyromegaly present  Cardiovascular: Normal rate, regular rhythm and normal heart sounds  Pulmonary/Chest: Effort normal  He has no wheezes  He has no rales  Decreased BS b/l   Abdominal: Soft  Bowel sounds are normal  He exhibits distension  There is no tenderness  +Rectal tube   Genitourinary:   Genitourinary Comments: +hernandez   Musculoskeletal: He exhibits edema (b/l LE pitting)  Neurological:   Nonverbal, lethargic, moans in pain   Skin: Skin is warm and dry  No rash noted  He is not diaphoretic    +jaundice   Psychiatric:   Flat affect   Vitals reviewed        Invasive Devices     Peripheral Intravenous Line            Peripheral IV 19 Left Arm 1 day    Peripheral IV 19 Right Antecubital 1 day    Peripheral IV 19 Right Forearm 1 day    Peripheral IV 19 Left Arm less than 1 day          Arterial Line            Arterial Line 12/29/19 Radial 1 day          Hemodialysis Catheter            HD Temporary Double Catheter 1 day          Drain            Urethral Catheter Temperature probe 1 day    Rectal Tube less than 1 day                Medications:    Scheduled Meds:  Current Facility-Administered Medications:  artificial tear  Both Eyes BID Alessia Veronica PA-C    ascorbic acid in sodium chloride 0 9% 100 mL IVPB 1,500 mg Intravenous Q6H Alessia Veronica PA-C Last Rate: 0 mg (12/30/19 0100)   cefepime 2,000 mg Intravenous Q12H Alessia Veronica PA-C Last Rate: Stopped (12/30/19 0932)   dexmedetomidine 0 1-0 7 mcg/kg/hr Intravenous Titrated Zachariah Garcia MD Last Rate: 0 7 mcg/kg/hr (12/30/19 0800)   hydrocortisone sodium succinate 50 mg Intravenous Q6H Aamir Veronica PA-C    ketamine 0 2 mg/kg/hr Intravenous Continuous Flora Jung MD Last Rate: 0 2 mg/kg/hr (12/30/19 0800)   Ketamine HCl 0 5 mg/kg Intravenous Q2H PRN Flora Jung MD    metroNIDAZOLE 500 mg Intravenous [address]RI[address] Last Rate: Stopped (12/30/19 0829)   norepinephrine 1-30 mcg/min Intravenous Titrated Zachariah Garcia MD Last Rate: 30 mcg/min (12/30/19 1015)   NxStage K 2/Ca 3 20,000 mL Dialysis Continuous Irene Rod MD Last Rate: 0 mL (12/30/19 0635)   pantoprazole 40 mg Intravenous Q24H [address] Aamir Veronica PA-C    phenylephine  mcg/min Intravenous Titrated Sree Brian PA-C Last Rate: 130 mcg/min (12/30/19 0950)   dextrose 10 % and sodium chloride 0 9 % 100 mL/hr Intravenous Continuous Zachariah Garcia MD Last Rate: 100 mL/hr (12/30/19 0513)   thiamine 200 mg Intravenous Q12H Alessia Veronica PA-C Last Rate: Stopped (12/30/19 0932)   vancomycin 20 mg/kg Intravenous Q24H Nasim Coffey DO Last Rate: Stopped (12/29/19 1253)   vasopressin (PITRESSIN) in 0 9 % sodium chloride 100 mL 0 04 Units/min Intravenous Continuous Josesito Garcia MD Last Rate: 0 04 Units/min (12/30/19 0800)       PRN Meds: Ketamine HCl    Continuous Infusions:  dexmedetomidine 0 1-0 7 mcg/kg/hr Last Rate: 0 7 mcg/kg/hr (12/30/19 0800)   ketamine 0 2 mg/kg/hr Last Rate: 0 2 mg/kg/hr (12/30/19 0800)   norepinephrine 1-30 mcg/min Last Rate: 30 mcg/min (12/30/19 1015)   NxStage K 2/Ca 3 20,000 mL Last Rate: 0 mL (12/30/19 0635)   phenylephine  mcg/min Last Rate: 130 mcg/min (12/30/19 0950)   dextrose 10 % and sodium chloride 0 9 % 100 mL/hr Last Rate: 100 mL/hr (12/30/19 0513)   vasopressin (PITRESSIN) in 0 9 % sodium chloride 100 mL 0 04 Units/min Last Rate: 0 04 Units/min (12/30/19 0800)           LAB RESULTS:      Results from last 7 days   Lab Units 12/30/19  0532 12/30/19  0012 12/29/19  1802 12/29/19  1214 12/29/19  1005 12/29/19  0608 12/29/19  0422 12/29/19  0222 12/28/19  2330 12/28/19  2145  12/28/19  2144   WBC Thousand/uL 45 98*  --   --   --   --  46 36*  --   --   --   --   --  43 88*   HEMOGLOBIN g/dL 8 1*  --   --   --  8 6* 8 6*  --   --   --   --   --  10 2*   I STAT HEMOGLOBIN g/dl  --   --   --   --   --   --   --   --  9 2* 11 9*  --   --    HEMATOCRIT % 23 5*  --   --   --  25 2* 26 2*  --   --   --   --   --  32 6*   HEMATOCRIT, ISTAT %  --   --   --   --   --   --   --   --  27* 35*  --   --    PLATELETS Thousands/uL 567*  --   --   --   --  667* 656*  --   --   --   --  811*   LYMPHO PCT % 2*  --   --   --   --  9*  --   --   --   --   --  6*   MONO PCT % 3*  --   --   --   --  9  --   --   --   --   --  7   EOS PCT % 0  --   --   --   --  0  --   --   --   --   --  0   POTASSIUM mmol/L 4 1  4 1 4 3 4 6 5 2  --  6 2*  --  6 7*  --   --   --   --    CHLORIDE mmol/L 105  105 105 102 100  --  97*  --  94*  --   --   --   --    CO2 mmol/L 18*  17* 17* 15* 14*  --  12*  --  12*  --   --   --   --    CO2, I-STAT mmol/L  --   --   --   --   --   --   --   --  12* 12*   < >  --    BUN mg/dL 24  25 30* 32* 38*  --  49*  --  53*  --   --   --   --    CREATININE mg/dL 2 98*  2 98* 3 04* 3 24* 3 58*  --  4 07*  --  4 56* --   --   --   --    CALCIUM mg/dL 8 4  8 4 8 2* 7 7* 7 5*  --  6 9*  --  6 7*  --   --   --   --    ALK PHOS U/L 1,205*  --  1,411*  --   --   --   --  1,521*  --   --   --   --    ALT U/L 2,005*  --  2,246*  --   --   --   --  2,338*  --   --   --   --    AST U/L 8,433*  --  11,037*  --   --   --   --  13,166*  --   --   --   --    GLUCOSE, ISTAT mg/dl  --   --   --   --   --   --   --   --  95 40*  --   --    MAGNESIUM mg/dL 2 0 2 0 2 0 2 3  --   --   --  2 5  --   --   --   --    PHOSPHORUS mg/dL 4 9* 5 6* 6 5* 7 2*  --   --   --  12 6*  --   --   --  17 3*    < > = values in this interval not displayed  CUTURES:  Lab Results   Component Value Date    BLOODCX Enterococcus species (A) 12/28/2019    BLOODCX Enterococcus species (A) 12/28/2019                 Portions of the record may have been created with voice recognition software  Occasional wrong word or "sound a like" substitutions may have occurred due to the inherent limitations of voice recognition software  Read the chart carefully and recognize, using context, where substitutions have occurred  If you have any questions, please contact the dictating provider

## 2019-12-30 NOTE — UTILIZATION REVIEW
Initial Clinical Review    12/29 Transfer from [facility] ED  Pt at [facility] from 12/28 thru 12/29  Admission: Date/Time/Statement: Inpatient Admission Orders (From admission, onward)     Ordered        12/29/19 0202  Inpatient Admission  Once                   Orders Placed This Encounter   Procedures    Inpatient Admission     Standing Status:   Standing     Number of Occurrences:   1     Order Specific Question:   Admitting Physician     Answer:   Yogesh Bethea     Order Specific Question:   Level of Care     Answer:   Critical Care [15]     Order Specific Question:   Estimated length of stay     Answer:   More than 2 Midnights     Order Specific Question:   Certification     Answer:   I certify that inpatient services are medically necessary for this patient for a duration of greater than two midnights  See H&P and MD Progress Notes for additional information about the patient's course of treatment  Assessment/Plan:   34y Male, transfer from [facility], initially presented to [facility] altered mental status and no urine output  PMH of hepatocullar carcinoma diagnosis in 2013 - recent chemo  In [facility] ED found to have severe renal failure, acidosis, hyperkalemia, and anuria  After 4 L of IV fluids, patient still remained anuric  Nephrology was consulted and the decision was made to transfer the patient to [facility] for CVVHD  CT chest abdomen and pelvis demonstrated a diffusely enlarged liver with a numeral focal low density lesions seen throughout  There are multiple moderately dilated loops of small bowel, with some relative underdistention of the distal small bowel and the colon suspicious for small-bowel obstruction  He exhibits edema  He exhibits no tenderness or deformity  1+ pitting edema in the bilateral lower extremities   Temporary HD catheter was placed in the right IJ and see CVVHD was initiated      Admit Inpatient level of care for Hepatic encephalopathy, Shock likely secondary to liver failure , elevated troponin, wide complex tachycardia  Hepatocellular carcinoma, chronic liver disease, possible small-bowel obstruction  AZALIA, severe metabolic/lactic acidosis, Hyponatremia, Hyperkalemia, Hypoglycemia and Hypothermia  Leukocytosis  Ammnia 186, Lactulose enema and frequent neuro checks  Map goal greater than 65, Titrate Levophed, Vasopressin , fluid resuscitation and trend troponin  Maintain oxygen saturation greater than 92%  S/P chemo, currently on Ramucirumab and General Surgery consult  NG tube if vomiting, per pt he is passing flatus  CVVH and hernandez cath to monitori I and Os  Monitor electrolytes and replete per CVVHD  NPO  IVF - D5 NS  Bld cultures pending and trend lactic acid  12/29 General Surgery cons; w/ Stage IV Hepatocellular carcinoma/hepatoblastoma, now w/ severe metabolic acidosis and renal failure requiring CVVHD, also p/w imaging findings consistent with SBO  NPO  NGT if nausea or vomiting, serial abdominal exams and continue CVVH  --No acute surgical intervention at this time due to high surgical risk  12/29 Progress notes; Iram placed  12/29 per Nephrology cons; Severe AZALIA POA, baseline serum creatinine 0 9 on 12/19/19  Creat on admit 5 0  given severe renal failure, acidosis, life-threatening hyperkalemia, anuria, severe lactic acidosis, patient was started on CVVHD on 12/29/19  Patient was seen and examined on CVVHD at 6:30 a m  [facility] Tolerating dialysis well so far  Continue current prescription  Avoid any further chemo for now and nephrotoxins or NSAIDS  12/29 Palliative care cons; Eyes with icteric sclera, larges unresponsive, moans occasionally and jaundices  Symptom management  DNAR/DNI    12/30 Progress notes; Ammonia 164, Lactulose retention enema and pain control with prn ketamine   Delirium precautions and trend neuro exam  Hemodynamic infusions: Levophed, 30 mcg/min, Neosynephrine, 160 mcg/min, Vasopressin, 0 04 Units/min  Wean mireya as able  MAP goal > 65  Maintain NPO, continue to trend LFTs and coags  Continue protonix  IVF  CVVH for volume removal as needed  Trend UOP and Bun/Cr  Strict I and O      ED Triage Vitals   Temperature Pulse Respirations Blood Pressure SpO2   12/29/19 0100 12/29/19 0100 12/29/19 0100 12/29/19 0100 12/29/19 0100   (!) 93 7 °F (34 3 °C) 98 (!) 26 110/62 95 %      Temp Source Heart Rate Source Patient Position - Orthostatic VS BP Location FiO2 (%)   12/29/19 0053 12/29/19 0800 12/29/19 0800 12/29/19 0800 --   Bladder Monitor Lying Left arm       Pain Score       --               Wt Readings from Last 1 Encounters:   12/30/19 95 2 kg (209 lb 14 1 oz)     Additional Vital Signs:   12/30/19 0800  98 4 °F (36 9 °C)  76  19      138/62  82 mmHg  98 %  None (Room air)     12/30/19 0730    78  18      108/50  64 mmHg  98 %       12/30/19 0700    80  19      116/54  70 mmHg  98 %       12/30/19 0600    80  22      124/60  76 mmHg  97 %       12/30/19 0500    80  19      112/54  68 mmHg  97 %       12/30/19 0400  98 6 °F (37 °C)  80  21      116/56  72 mmHg  97 %  None (Room air)     12/30/19 0300    78  20      104/52  66 mmHg  97 %       12/30/19 0244    78  20      90/46  58 mmHg  97 %       12/30/19 0239    76  21      96/50             12/29/19 2345    76  22      120/64  80 mmHg  98 %       12/29/19 2300  97 3 °F (36 3 °C)Abnormal   76  20      96/58  70 mmHg  99 %       12/29/19 2245  97 1 °F (36 2 °C)Abnormal                      12/29/19 2230  97 °F (36 1 °C)Abnormal                      12/29/19 2228    76  20      76/46  58 mmHg  99 %       12/29/19 2215  97 °F (36 1 °C)Abnormal                      12/29/19 2200  96 8 °F (36 °C)Abnormal   72  22                   Pertinent Labs/Diagnostic Test Results:   12/28 CT Head - No acute intracranial abnormality is seen        12/28 CT Chest/Abd/Pelvis - Diffusely enlarged liver with innumerable hepatic lesions, correlates with the patient's history of hepatoblastoma  Multiple moderately dilated loops of small bowel seen in the mid and lower abdomen, largest measures approximately 4 8 cm in diameter  Some fecal appearing material seen within multiple small bowel loops  There is also relative underdistention of the   distal small bowel and the colon although no discrete transition point is identified  Findings taken together are suspicious for small bowel obstruction  Small amount of fluid in the peritoneal cavity, probably reactive      Partially distended bladder  Mild circumferential bladder wall thickening noted, probably exaggerated by underdistention  Superimposed cystitis could be considered in the appropriate clinical setting      Multiple subcentimeter pulmonary nodules as described, pulmonary metastatic disease is the diagnosis of exclusion  82/59 PCXR - No application after catheter placement    Position appropriate    Results from last 7 days   Lab Units 12/30/19  0532 12/29/19  1005 12/29/19  0608 12/29/19  0422 12/28/19  2330 12/28/19  2145 12/28/19  2144   WBC Thousand/uL 45 98*  --  46 36*  --   --   --  43 88*   HEMOGLOBIN g/dL 8 1* 8 6* 8 6*  --   --   --  10 2*   I STAT HEMOGLOBIN g/dl  --   --   --   --  9 2* 11 9*  --    HEMATOCRIT % 23 5* 25 2* 26 2*  --   --   --  32 6*   HEMATOCRIT, ISTAT %  --   --   --   --  27* 35*  --    PLATELETS Thousands/uL 567*  --  667* 656*  --   --  811*   BANDS PCT %  --   --   --   --   --   --  10*         Results from last 7 days   Lab Units 12/30/19  0532 12/30/19  0012 12/29/19  1802 12/29/19  1214 12/29/19  0608 12/29/19  0222   SODIUM mmol/L 136  136 137 135* 131* 129* 126*   POTASSIUM mmol/L 4 1  4 1 4 3 4 6 5 2 6 2* 6 7*   CHLORIDE mmol/L 105  105 105 102 100 97* 94*   CO2 mmol/L 18*  17* 17* 15* 14* 12* 12*   ANION GAP mmol/L 13  14* 15* 18* 17* 20* 20*   BUN mg/dL 24  25 30* 32* 38* 49* 53*   CREATININE mg/dL 2 98*  2 98* 3 04* 3 24* 3 58* 4 07* 4 56*   EGFR ml/min/1 73sq m 27  27 26 24 22 19 16   CALCIUM mg/dL 8 4  8 4 8 2* 7 7* 7 5* 6 9* 6 7*   CALCIUM, IONIZED mmol/L 1 05* 0 99* 0 94* 0 87* 0 80* 0 75*   MAGNESIUM mg/dL 2 0 2 0 2 0 2 3  --  2 5   PHOSPHORUS mg/dL 4 9* 5 6* 6 5* 7 2*  --  12 6*     Results from last 7 days   Lab Units 12/30/19  0532 12/29/19  1802 12/29/19  0222 12/28/19  2144   AST U/L 8,433* 11,037* 13,166*  --    ALT U/L 2,005* 2,246* 2,338*  --    ALK PHOS U/L 1,205* 1,411* 1,521*  --    TOTAL PROTEIN g/dL 4 6* 5 1* 5 8*  --    ALBUMIN g/dL 1 9* 2 0* 2 1*  --    TOTAL BILIRUBIN mg/dL 26 74* 26 85* 32 51*  --    BILIRUBIN DIRECT mg/dL  --  23 18*  --   --    AMMONIA umol/L 164*  --   --  186*     Results from last 7 days   Lab Units 12/30/19  0757 12/30/19  0639 12/30/19  0407 12/30/19  0214 12/30/19  0014 12/29/19  2211 12/29/19  2028 12/29/19  1806 12/29/19  1721 12/29/19  1607   POC GLUCOSE mg/dl 107 102 105 105 106 97 92 88 88 76     Results from last 7 days   Lab Units 12/30/19  0532 12/30/19  0012 12/29/19  1802 12/29/19  1214 12/29/19  0608 12/29/19  0222   GLUCOSE RANDOM mg/dL 75  78 88 77 71 59* 39*     Results from last 7 days   Lab Units 12/28/19  2144   PH BRENDA  7 135*   PCO2 BRENDA mm Hg 27 2*   PO2 BRENDA mm Hg 33 9*   HCO3 BRENDA mmol/L 8 9*   BASE EXC BRENDA mmol/L -18 7   O2 CONTENT BRENDA ml/dL 6 9   O2 HGB, VENOUS % 42 6*             Results from last 7 days   Lab Units 12/29/19  1005 12/29/19  0608 12/29/19  0423 12/28/19  2144   TROPONIN I ng/mL 2 42* 1 14* 0 70* 0 21*     Results from last 7 days   Lab Units 12/29/19  0422   D-DIMER QUANTITATIVE ug/ml FEU >10 00*     Results from last 7 days   Lab Units 12/30/19  0532 12/29/19  1215 12/29/19  0422 12/28/19  2144   PROTIME seconds 81 5* >120 0* >120 0* >120 0*   INR  10 30* >15 00* >15 00*  --    PTT seconds  --  85* 87* 83*     Results from last 7 days   Lab Units 12/28/19  2144   TSH 3RD GENERATON uIU/mL 0 010*     Results from last 7 days   Lab Units 12/30/19  0757   PROCALCITONIN ng/ml 36 01*     Results from last 7 days   Lab Units 12/29/19  2052 12/29/19  1802 12/29/19  1526 12/29/19  1214 12/29/19  0423 12/29/19  0008 12/28/19  2144   LACTIC ACID mmol/L 7 7* 7 5* 7 5* 7 3* 10 1* 8 4* 9 4*     Results from last 7 days   Lab Units 12/28/19  2144   LIPASE u/L 514*     Results from last 7 days   Lab Units 12/28/19  2144   ETHANOL LVL mg/dL <3   ACETAMINOPHEN LVL ug/mL <2*   SALICYLATE LVL mg/dL <3*     Results from last 7 days   Lab Units 12/28/19  2246 12/28/19  2230   BLOOD CULTURE  Enterococcus species* Enterococcus species*   GRAM STAIN RESULT  Gram positive cocci in pairs and chains* Gram positive cocci in pairs and chains*     Results from last 7 days   Lab Units 12/28/19  2144   TOTAL COUNTED  100       No past medical history on file    Present on Admission:   Hepatoblastoma [facility])      Admitting Diagnosis: Multiple organ failure with liver failure ([facility] [address] ) [K72 [id number]]  Age/Sex: 34 y o  male     Admission Orders:  NPO  A Line monitoring  CVVHD  Temporary HD Catheter  Bld culture x2  Neurological checks q1h  IP CONSULT TO ACUTE CARE SURGERY  IP CONSULT TO PALLIATIVE CARE    Scheduled Medications:  Medications:  artificial tear  Both Eyes BID   ascorbic acid in sodium chloride 0 9% 100 mL IVPB 1,500 mg Intravenous Q6H   cefepime 2,000 mg Intravenous Q12H   hydrocortisone sodium succinate 50 mg Intravenous Q6H   metroNIDAZOLE 500 mg Intravenous Q8H   pantoprazole 40 mg Intravenous Q24H MENDEZ   thiamine 200 mg Intravenous Q12H   vancomycin 20 mg/kg Intravenous Q24H     Continuous IV Infusions:  dexmedetomidine 0 1-0 7 mcg/kg/hr Intravenous Titrated   ketamine 0 2 mg/kg/hr Intravenous Continuous   norepinephrine 1-30 mcg/min Intravenous Titrated   NxStage K 2/Ca 3 20,000 mL Dialysis Continuous   phenylephine  mcg/min Intravenous Titrated   dextrose 10 % and sodium chloride 0 9 % 100 mL/hr Intravenous Continuous   vasopressin (PITRESSIN) in 0 9 % sodium chloride 100 mL 0 04 Units/min Intravenous Continuous     PRN Meds:  Ketamine HCl 0 5 mg/kg Intravenous Q2H PRN 12/29 x1     Network Utilization Review Department  [email or web address]  ATTENTION: Please call with any questions or concerns to [phone] and carefully listen to the prompts so that you are directed to the right person  All voicemails are confidential   Clara Arita all requests for admission clinical reviews, approved or denied determinations and any other requests to dedicated fax number below belonging to the campus where the patient is receiving treatment   List of dedicated fax numbers for the Facilities:  [address] DENIALS (Administrative/Medical Necessity) [phone]   [address] (Maternity/NICU/Pediatrics) [phone]   Francisco Harris [phone]   Dixon Braga [phone]   Shaneka Todd [phone]   Brianne Rascon [phone]   [address] [place] [address] [phone]   [facility]  [phone]   [address] [facility] [address]  [address] [address] [address] [facility] [phone]

## 2019-12-30 NOTE — PHYSICAL THERAPY NOTE
Physical Therapy Cancellation Note      PT orders received, chart review performed  Per discussion with PERRY Cohen, pt currently not medically stable for PT evaluation   PT to hold evaluation, continue to follow    Reymundo Ribeiro PT, DPT

## 2019-12-30 NOTE — PROGRESS NOTES
Vancomycin IV Pharmacy-to-Dose Consultation    Oli Harman is a 34 y o  male who is currently receiving Vancomycin IV with management by the Pharmacy Consult service for the treatment of Enterococcus species bacteremia  Assessment/Plan:    The patient's chart was reviewed  Patient continues on CVVHD with anuria  There are no signs or symptoms of infusion reactions documented  The following nephrotoxicity factors are present:  Medications: vasopressors  Patient-Factors: hypotension, renal dysfunction    Based on todays assessment, will continue current vancomycin (Day # 3) dosing of 2000 mg (20 mg/kg) IV q24h, with a plan for trough to be drawn at 1030 on 12/31 (prior to the 3rd dose)  Pharmacy will continue to follow closely for s/sx of infusion reactions and appropriateness of therapy  BMP and CBC will be ordered per protocol  We will continue to follow the patients culture results and clinical progress daily      Margo Vasquez, PharmD, [address] Clinical Pharmacist  [phone]

## 2019-12-30 NOTE — PROGRESS NOTES
Pastoral Care Progress Note    2019  Patient: Reno Steele : 1990  Admission Date & Time: 2019 0039  MRN: [id number] [facility]: [id number]                     Chaplaincy Interventions Utilized:   Empowerment: Clarified, confirmed, or reviewed information from treatment team             Relationship Building: Listened empathically                Chaplaincy Outcomes Achieved:  Expressed gratitude          Spiritual Coping Strategies Utilized:   Spiritual gratitude       19 [address] Involvement Patient active with Christianity   Spiritual Beliefs/Perceptions   Concept of God Accepting   God's Role in Disease Natural   Relationship with God Close   Support Systems Parent; Family members;Sabianist/frank community   Psychosocial   Patient Behaviors/Mood Unable to assess   Stress Factors   Family Stress Factors Health changes   Coping Responses   Family Coping Sadness;Open/discussion   Family Spiritual Assessment   Fear of Dying yes  (Mother afraid that he won't pull thru this time)

## 2019-12-30 NOTE — CONSULTS
Vancomycin IV Pharmacy-to-Dose Consultation    Anatoliy Thompson is a 34 y o  male who was receiving Vancomycin IV with management by the Pharmacy Consult service for treatment of Enterococcus faecalis bacteremia  The patients Vancomycin therapy has been discontinued  Thank you for allowing us to take part in this patient's care  Pharmacy will sign-off now; please call or re-consult if there are any questions        Heike Lake, PharmD, [facility] [place] Clinical Pharmacist  [phone]

## 2019-12-30 NOTE — PROGRESS NOTES
Daily Progress Note - Critical Care   Anna Davis 34 y o  male MRN: [id number]  Unit/Bed#: MICU 06 Encounter: [id number]        ----------------------------------------------------------------------------------------  HPI/24hr events: No major changes overnight  CVVH ran even  Some issues with agitation/discomfort requiring ketamine boluses  ---------------------------------------------------------------------------------------  SUBJECTIVE    Review of Systems  Review of systems was unable to be performed secondary to AMS  ---------------------------------------------------------------------------------------  Assessment and Plan:    Neuro:  · Diagnosis: Acute toxic metabolic/hepatic encephalopathy  · Plan:  · Ammonia 164 (186)  · Lactulose retention enema  · Pain controlled with:  · PRN: ketamine  · Sedation plan/Daily sedation holiday: precedex, ketamine  · RASS goal: -1 Drowsy and 0 Alert and Calm  · Delirium Precautions  · CAM ICU per protocol  · Regulate sleep/wake cycle+  · Trend neuro exam    CV:   · Diagnosis: Profound shock state- suspect septic  · Plan:   · Hemodynamic infusions: Levophed, 30 mcg/min, Neosynephrine, 160 mcg/min, Vasopressin, 0 04 Units/min  · Wean mireya as able  · MAP goal > 65  · Systolic (22DCD), HOK:385 , Min:115 , Max:151   ·   · Follow rhythm on telemetry  · Continue Marik protocol    Lung:   · Diagnosis: no acute issues  · Plan:   · Pt remains level 3- DNR, DNI  · Pulmonary hygiene  · As possible maintain HOB >30*    GI:   · Diagnosis: Acute on chronic liver failure with transaminitis, hyperbilirubinemia, and hyperammonemia; hepatocellular carcinoma; poss   SBO  · Plan:   · Maintain NPO  · Continue to follow LFTs, coags  · Lactulose enema  · Continue protonix 40 daily  · Ideally would obtain enteral access however limited with significant coagulopathy  · Poss GI consult    FEN:   · Plan:   · Fluid/Diuretic plan: D10 fluid, no diuretic indicated, CVVH for volume removal as needed  · Nutrition/diet plan: NPO currently  · Replete electrolytes with goals: K >4 0, Mag >2 0, and Phos >3 0    :   · Diagnosis: AZALIA  · Plan:   · Continue CVVH, nephrology following  · Indwelling Heart present: yes   · Trend UOP and BUN/creat  · Strict I and O    Intake/Output Summary (Last 24 hours) at 2019 3874  Last data filed at 2019 0600  Gross per 24 hour   Intake 9813 ml   Output 8528 ml   Net 1285 ml   ·     ID:   · Diagnosis: Septic shock, gram positive cocci bacteremia  · Plan:   ·  BCx-- gram positive cocci in gerri and chains x2  · Repeat BCx this AM  · Consider fungal culture  · ID consult  · Continue Mariyazan protocol  · Abx ordered: vancomycin, metronidazole and cefepime (Day 2/?)  · Trend temps and WBC count  · Temp (24hrs), Av °F (36 1 °C), Min:96 3 °F (35 7 °C), Max:98 6 °F (37 °C)  ·     Heme:   · Diagnosis: Anemia, coagulopathy in setting of hepatic failure  · Plan:   · Poss consumptive with CVVH and coagulopathy vs  Hemolytic with positive schistocytes  · Hold off on heme consult during acute disease phase  · Received IV Vit K yesterday with no improvement in INR  · Trend hgb and plts  · Transfuse as needed for goal hgb >7 0    Endo:   · Diagnosis: Significant hypoglycemia in setting of liver failure  · Plan:   · Continue D10    MSK/Skin:  · Plan:   · Mobility goal: early mobility as able in medical course  · PT consult: not applicable  · OT consult: not applicable  · Frequent turning and pressure off-loading        Disposition: Continue Critical Care , prognosis guarded- palliative care following  Code Status: Level 3 - DNAR and DNI  ---------------------------------------------------------------------------------------  ICU CORE MEASURES    Prophylaxis   VTE Pharmacologic Prophylaxis: Pharmacologic VTE Prophylaxis contraindicated due to coagulopathy  VTE Mechanical Prophylaxis: sequential compression device  Stress Ulcer Prophylaxis: Pantoprazole IV     ABCDE Protocol (if indicated)  Plan to perform spontaneous awakening trial today? Not applicable  Plan to perform spontaneous breathing trial today? Not applicable  Obvious barriers to extubation? Not applicable  CAM-ICU: acute encephalopathy    Invasive Devices Review  Invasive Devices     Peripheral Intravenous Line            Peripheral IV 12/28/19 Left Arm 1 day    Peripheral IV 12/28/19 Right Antecubital 1 day    Peripheral IV 12/28/19 Right Forearm 1 day    Peripheral IV 12/30/19 Left Arm less than 1 day          Arterial Line            Arterial Line 12/29/19 Radial 1 day          Hemodialysis Catheter            HD Temporary Double Catheter 1 day          Drain            Urethral Catheter Temperature probe 1 day    Rectal Tube less than 1 day              Can any invasive devices be discontinued today? No (provide explanation): CVVH  ---------------------------------------------------------------------------------------  OBJECTIVE    Physical Exam   HENT:   Head: Normocephalic and atraumatic  Eyes: Pupils are equal, round, and reactive to light  Scleral icterus is present  Cardiovascular: Normal rate, regular rhythm and normal heart sounds  Exam reveals no friction rub  No murmur heard  Pulmonary/Chest: No stridor  He has no wheezes  He has no rales  Breaths more labored today  Abdominal: He exhibits distension  He exhibits no mass  There is tenderness (Difficult to assess, but appears to evoke discomfort)  There is no guarding  More firm  Rare bowel sounds   Musculoskeletal: He exhibits edema (Trace in extremities)  Neurological:   Minimal to no response to noxious stimuli in extremities  Does groan when being re-adjusted by nursing staff  Skin: Skin is warm and dry  He is not diaphoretic     Profound jaundice       Vitals   Vitals:    12/30/19 0300 12/30/19 0400 12/30/19 0500 12/30/19 0600   BP:       BP Location:       Pulse: 78 80 80 80   Resp: 20 21 19 22   Temp:  98 6 °F (37 °C)     TempSrc:  Bladder SpO2: 97% 97% 97% 97%   Weight:    95 2 kg (209 lb 14 1 oz)     Temp (24hrs), Av °F (36 1 °C), Min:96 3 °F (35 7 °C), Max:98 6 °F (37 °C)  Current: Temperature: 98 6 °F (37 °C)    Respiratory:  SpO2: SpO2: 97 %, SpO2 Activity: SpO2 Activity: At Rest, SpO2 Device: O2 Device: None (Room air)       Invasive/non-invasive ventilation settings   Respiratory    Lab Data (Last 4 hours)    None         O2/Vent Data (Last 4 hours)    None                Height and Weights      IBW: -88 kg  Body mass index is 24 57 kg/m²  Weight (last 2 days)     Date/Time   Weight    19 0600   95 2 (209 88)    19 0214   95 3 (210 1)    19 0113   93 5 (206 13)              Intake and Output  I/O        0701 -  0700  0701 -  0700    I V  (mL/kg) 212 (2 2) 7508 5 (78 9)    IV Piggyback  2092 5    Total Intake(mL/kg) 212 (2 2) 9601 (100 9)    Urine (mL/kg/hr)  50 (0)    Other 432 8163    Stool  100    Total Output 432 8313    Net -220 +1288              UOP: minimal     Nutrition       Diet Orders   (From admission, onward)             Start     Ordered    19  Diet NPO  Diet effective now     Question Answer Comment   Diet Type NPO    RD to adjust diet per protocol?  Yes        19              Laboratory and Diagnostics:  Results from last 7 days   Lab Units 19  0532 19  1005 19  0608 19  0422 19  2330 19  2145 19  2144   WBC Thousand/uL 45 98*  --  46 36*  --   --   --  43 88*   HEMOGLOBIN g/dL 8 1* 8 6* 8 6*  --   --   --  10 2*   I STAT HEMOGLOBIN g/dl  --   --   --   --  9 2* 11 9*  --    HEMATOCRIT % 23 5* 25 2* 26 2*  --   --   --  32 6*   HEMATOCRIT, ISTAT %  --   --   --   --  27* 35*  --    PLATELETS Thousands/uL 567*  --  667* 656*  --   --  811*   BANDS PCT %  --   --   --   --   --   --  10*   MONO PCT %  --   --  9  --   --   --  7     Results from last 7 days   Lab Units 19  0532 19  0012 19  1803 12/29/19  1214 12/29/19  0608 12/29/19  0222 12/28/19  2330   SODIUM mmol/L 136 137 135* 131* 129* 126*  --    POTASSIUM mmol/L 4 1 4 3 4 6 5 2 6 2* 6 7*  --    CHLORIDE mmol/L 105 105 102 100 97* 94*  --    CO2 mmol/L 17* 17* 15* 14* 12* 12*  --    CO2, I-STAT mmol/L  --   --   --   --   --   --  12*   AGAP mmol/L  --   --   --   --   --   --  24*   ANION GAP mmol/L 14* 15* 18* 17* 20* 20*  --    BUN mg/dL 25 30* 32* 38* 49* 53*  --    CREATININE mg/dL 2 98* 3 04* 3 24* 3 58* 4 07* 4 56*  --    CALCIUM mg/dL 8 4 8 2* 7 7* 7 5* 6 9* 6 7*  --    GLUCOSE RANDOM mg/dL 78 88 77 71 59* 39*  --    ALT U/L  --   --  2,246*  --   --  2,338*  --    AST U/L  --   --  11,037*  --   --  13,166*  --    ALK PHOS U/L  --   --  1,411*  --   --  1,521*  --    ALBUMIN g/dL  --   --  2 0*  --   --  2 1*  --    TOTAL BILIRUBIN mg/dL  --   --  26 85*  --   --  32 51*  --      Results from last 7 days   Lab Units 12/30/19  0532 12/30/19  0012 12/29/19  1802 12/29/19  1214 12/29/19 0222 12/28/19  2144   MAGNESIUM mg/dL 2 0 2 0 2 0 2 3 2 5  --    PHOSPHORUS mg/dL 4 9* 5 6* 6 5* 7 2* 12 6* 17 3*      Results from last 7 days   Lab Units 12/29/19  1215 12/29/19  0422 12/28/19  2144   INR  >15 00* >15 00*  --    PTT seconds 85* 87* 83*      Results from last 7 days   Lab Units 12/29/19  1005 12/29/19  0608 12/29/19  0423 12/28/19  2144   TROPONIN I ng/mL 2 42* 1 14* 0 70* 0 21*     Results from last 7 days   Lab Units 12/29/19  2052 12/29/19  1802 12/29/19  1526 12/29/19  1214 12/29/19  0423 12/29/19  0008 12/28/19  2144   LACTIC ACID mmol/L 7 7* 7 5* 7 5* 7 3* 10 1* 8 4* 9 4*     ABG:    VBG:  Results from last 7 days   Lab Units 12/28/19  2144   PH BRENDA  7 135*   PCO2 BRENDA mm Hg 27 2*   PO2 BRENDA mm Hg 33 9*   HCO3 BRENDA mmol/L 8 9*   BASE EXC BRENDA mmol/L -18 7           Micro  Results from last 7 days   Lab Units 12/28/19  2246 12/28/19  2230   GRAM STAIN RESULT  Gram positive cocci in pairs and chains* Gram positive cocci in pairs and chains* Imaging: No new I have personally reviewed pertinent reports     and I have personally reviewed pertinent films in PACS    Active Medications  Scheduled Meds:  Current Facility-Administered Medications:  artificial tear  Both Eyes BID Radha Veronica PA-C    ascorbic acid in sodium chloride 0 9% 100 mL IVPB 1,500 mg Intravenous Q6H Radha Veronica PA-C Last Rate: 0 mg (12/30/19 0100)   cefepime 2,000 mg Intravenous Q12H Radha Veronica PA-C Last Rate: Stopped (12/30/19 0000)   dexmedetomidine 0 1-0 7 mcg/kg/hr Intravenous Titrated Jeniffer Garcia MD Last Rate: 0 7 mcg/kg/hr (12/30/19 7564)   hydrocortisone sodium succinate 50 mg Intravenous Q6H Radha Veronica PA-C    ketamine 0 2 mg/kg/hr Intravenous Continuous Garry Donovan MD Last Rate: 0 2 mg/kg/hr (12/30/19 0008)   Ketamine HCl 0 5 mg/kg Intravenous Q2H PRN Garry Donovan MD    metroNIDAZOLE 500 mg Intravenous Q8H Radha Silva PA-C Last Rate: Stopped (12/30/19 0200)   norepinephrine 1-30 mcg/min Intravenous Titrated Jeniffer Garcia MD Last Rate: 30 mcg/min (12/30/19 0521)   NxStage K 2/Ca 3 20,000 mL Dialysis Continuous Diane Collier MD    pantoprazole 40 mg Intravenous Q24H [address] Aamir Veronica PA-C    phenylephine  mcg/min Intravenous Titrated Cinthya Greene PA-C Last Rate: 160 mcg/min (12/30/19 0619)   dextrose 10 % and sodium chloride 0 9 % 100 mL/hr Intravenous Continuous Jeniffer Garcia MD Last Rate: 100 mL/hr (12/30/19 0513)   thiamine 200 mg Intravenous Q12H Cinthya Greene PA-C Last Rate: Stopped (12/30/19 0100)   vancomycin 20 mg/kg Intravenous Q24H Andreas Coffey,  Last Rate: Stopped (12/29/19 1253)   vasopressin (PITRESSIN) in 0 9 % sodium chloride 100 mL 0 04 Units/min Intravenous Continuous Josesito Garcia MD Last Rate: 0 04 Units/min (12/30/19 0355)     Continuous Infusions:    dexmedetomidine 0 1-0 7 mcg/kg/hr Last Rate: 0 7 mcg/kg/hr (12/30/19 0620)   ketamine 0 2 mg/kg/hr Last Rate: 0 2 mg/kg/hr (12/30/19 0008)   norepinephrine 1-30 mcg/min Last Rate: 30 mcg/min (12/30/19 0521)   NxStage K 2/Ca 3 20,000 mL    phenylephine  mcg/min Last Rate: 160 mcg/min (12/30/19 0619)   dextrose 10 % and sodium chloride 0 9 % 100 mL/hr Last Rate: 100 mL/hr (12/30/19 0513)   vasopressin (PITRESSIN) in 0 9 % sodium chloride 100 mL 0 04 Units/min Last Rate: 0 04 Units/min (12/30/19 0355)     PRN Meds:     Ketamine HCl 0 5 mg/kg Q2H PRN       Allergies   No Known Allergies    Advance Directive and Living Will:      Power of :    POLST:      Counseling / Coordination of Care  Total Critical Care time spent 36 minutes excluding procedures, teaching and family updates  Cecelia Mcmillan PA-C      Portions of the record may have been created with voice recognition software  Occasional wrong word or "sound a like" substitutions may have occurred due to the inherent limitations of voice recognition software    Read the chart carefully and recognize, using context, where substitutions have occurred

## 2019-12-30 NOTE — OCCUPATIONAL THERAPY NOTE
OT CANCEL NOTE    OT orders received  Chart reviewed  Per discussion w/ RN, pt is not medically appropriate to engage in skilled OT services at this time  Will hold initial OT evaluation  Will continue to follow pt on caseload and see pt when medically stable and as clinically appropriate      Ashley WINTERS, OTR/L

## 2019-12-30 NOTE — PROGRESS NOTES
Progress Note - General Surgery   Hayden Hatchet 34 y o  male MRN: [id number]  Unit/Bed#: MICU 06 Encounter: [id number]    Assessment:  29M w stage IV HCC/hepatoblastoma now in liver failure/hepatorenal requiring CVVH; severe metabolic acidosis on pressors; CT imaging concerning for SBO    Max Levo @ 30, Vaso @ 0 04, Quirino @ 150  CVVHD @ this time    Plan:  Care per MICU  Maintain NPO  No intervention from surgery team however will follow peripherally    Subjective/Objective   Chief Complaint:     Subjective: No acute events  Patient is on max pressor support  On CVVHD    Objective:     Blood pressure 135/58, pulse 80, temperature 98 6 °F (37 °C), temperature source Bladder, resp  rate 22, weight 95 2 kg (209 lb 14 1 oz), SpO2 97 %  ,Body mass index is 24 57 kg/m²  Intake/Output Summary (Last 24 hours) at 12/30/2019 8882  Last data filed at 12/30/2019 0600  Gross per 24 hour   Intake 9813 ml   Output 8528 ml   Net 1285 ml       Invasive Devices     Peripheral Intravenous Line            Peripheral IV 12/28/19 Left Arm 1 day    Peripheral IV 12/28/19 Right Antecubital 1 day    Peripheral IV 12/28/19 Right Forearm 1 day    Peripheral IV 12/30/19 Left Arm less than 1 day          Arterial Line            Arterial Line 12/29/19 Radial 1 day          Hemodialysis Catheter            HD Temporary Double Catheter 1 day          Drain            Urethral Catheter Temperature probe 1 day    Rectal Tube less than 1 day                Physical Exam: General: AAOx3  Head: normocephalic, atraumatic  Neck: supple, trachea midline  R CVVHD catheter in IJ  Respiratory: BS b/l  Abdomen: Soft, distended, patient sedated so exam with no pain  Heart: RRR, S1s2  Ext: Warm no cyanosis         Lab, Imaging and other studies:I have personally reviewed pertinent lab results      VTE Pharmacologic Prophylaxis: Heparin  VTE Mechanical Prophylaxis: sequential compression device

## 2019-12-30 NOTE — TREATMENT PLAN
Last K 3 9  Will change CRRT from 2K to 4K/3cal bath  Rest of CVVHD Rx the same   Monitor q6hr BMP, next at 6pm

## 2019-12-30 NOTE — TREATMENT PLAN
Change dialysis prescription-CRRT prescription to 2 K bath, 3 calcium as potassium level trended down to 4 6  Previously was on 0 K bath

## 2019-12-31 PROBLEM — E87.5 HYPERKALEMIA: Status: RESOLVED | Noted: 2019-01-01 | Resolved: 2019-01-01

## 2019-12-31 PROBLEM — E79.0 HYPERURICEMIA: Status: RESOLVED | Noted: 2019-01-01 | Resolved: 2019-01-01

## 2019-12-31 NOTE — PROGRESS NOTES
Pastoral Care Progress Note    2019  Patient: Angie Lackey : 1990  Admission Date & Time: 2019 0039  MRN: [id number] CSN: [id number]                     Chaplaincy Interventions Utilized:   Empowerment: Encouraged focus on present, Provided anticipatory guidance and Provided anxiety containment    Exploration: Explored hope, Explored emotional needs & resources, Explored relational needs & resources and Explored spiritual needs & resources    Collaboration: Advocated for patient/family    Relationship Building: Cultivated a relationship of care and support and Listened empathically    Ritual: Provided prayer            Chaplaincy Outcomes Achieved:            Spiritual Coping Strategies Utilized:   Spiritual comfort

## 2019-12-31 NOTE — PHYSICAL THERAPY NOTE
Physical Therapy Cancellation Note    PT orders received, chart review performed  Per discussion with RN Nicki Espino, pt remains medically unstable for PT evaluation  PT to hold evaluation and sign off at this time  Please re-consult when pt is clinically and medically appropriate   Thank you     Mariza Sood PT, DPT

## 2019-12-31 NOTE — PROGRESS NOTES
Daily Progress Note - Critical Care   Melchor Davis 34 y o  male MRN: [id number]  Unit/Bed#: MICU 06 Encounter: [id number]        ----------------------------------------------------------------------------------------  HPI/24hr events:   · Quirnio requirements increased overnight  · CVVHD down and restarted x 1  · PRN Ketamine this AM    ---------------------------------------------------------------------------------------  SUBJECTIVE  Pt unable to provide due to encephalopathy  Overnight events discussed with nursing staff and Pt's mother at [email or web address]    Review of Systems   Unable to perform ROS: Mental status change     ---------------------------------------------------------------------------------------  Assessment and Plan:    Plan:    Neuro:  Diagnosis: Hepatic Encephalopathy, Hyperammonemia, pain  Plan:  · Management of PAD  · Analgesia  · Sedation plan:  Ketamine 0 2 mcg/kg/hr and Precedex 0 6 mcg/kg/hr  · RASS goal: -5 Unarousable, -1 Drowsy and 0 Alert and Calm  · Delirium monitoring/management  · Regulate Sleep-wake cycle/CAM-ICU daily  · Serial Neuro Exams    Cardiac  Diagnosis: Septic Shock  Plan:  · Cardiac infusions: Levophed, 30 mcg/min, Neosynephrine, 150 mcg/min, Vasopressin, 0 04 Units/min  · Wean NE as able  · MAP goal > 65  · Rhythm: NSR  · Follow rhythm on telemetry  · Stress dose steroids 50 mg IV Q6 hours  Pulmonary  Diagnosis: Acute respiratory insufficency  Plan:  · Chlorhexidine ordered: yes  · Pulmonary toileting  · Wean FiO2 as able  Gastrointestinal  Diagnosis: SBO, Acute on Chronic hepatic failure, Hepatoblastoma and  HCC dx 2013 s/p Extended left hepatic lobectomy bile duct resection LND, HJ RYJ multiple rounds of chemo, TACE x 2 on Ramucirumad LD 12/17   Plan:  · GI consult  · Limited ability to place enteral support  FEN  Diagnosis: hypocalcemia, risk of protein caloric malnutrition  Plan:  · Fluid/Diuretic plan: CVVHD  · Goal 24 hour fluid balance: Euvolemia  · Nutrition/diet plan: NPO due to lack of enteral access  · Replete electrolytes with goals: K >4 0, Mag >2 0, and Phos >3 0  Genitourinary  Diagnosis: AZALIA on CVVHD secondary to ATN (multifactorial)  Plan:  · Nephrology following  · CVVHD  · Indwelling Heart present: yes   · Trend UOP and BUN/creat  · Strict I and O  Infectious Diease  Diagnosis: Septic Shock, Enterococcal Bacteremia  Plan:  · Abx ordered: Ampicillin 2 grams IV Q8 hours  · Repeat cultures pending  · Trend temps and WBC count  Procalcitonin:  Results from last 7 days   Lab Units 12/30/19  0757   PROCALCITONIN ng/ml 36 01*   ·     Heme:   Diagnosis: Anemia, Coagulopathy  Plan:  · Trend hgb and plts  · Transfuse as needed for goal hgb >7  Endo:   Diagnosis: Hypoglycemia secondary to hepatic dysfunction  Plan:  · Continue D10 infusion  MSK/Skin:  · Early Mobility/Exercise  · PT consult: not applicable  · OT consult: not applicable  · Turn and position patient Q2 hours  · Off load pressure points  · Allevyn preventative per protocol    Family:  · Family updated within 24 hours: yes       Disposition: Continue Critical Care   Code Status: Level 3 - DNAR and DNI  ---------------------------------------------------------------------------------------  ICU CORE MEASURES    Prophylaxis   VTE Pharmacologic Prophylaxis: Pharmacologic VTE Prophylaxis contraindicated due to Elevated INR  VTE Mechanical Prophylaxis: sequential compression device  Stress Ulcer Prophylaxis: Pantoprazole IV     ABCDE Protocol (if indicated)  Plan to perform spontaneous awakening trial today? Not applicable  Plan to perform spontaneous breathing trial today? Not applicable  Obvious barriers to extubation?  Not applicable  CAM-ICU: unable to obtain due to encephalopathy    Invasive Devices Review  Invasive Devices     Peripheral Intravenous Line            Peripheral IV 12/28/19 Left Arm 2 days    Peripheral IV 12/28/19 Right Antecubital 2 days    Peripheral IV 12/28/19 Right Forearm 2 days    Peripheral IV 19 Left Arm 1 day          Arterial Line            Arterial Line 19 Radial 2 days          Hemodialysis Catheter            HD Temporary Double Catheter 2 days          Drain            Urethral Catheter Temperature probe 2 days              Can any invasive devices be discontinued today? No (provide explanation): continued  CVVHD  ---------------------------------------------------------------------------------------  OBJECTIVE    Physical Exam   Constitutional: He appears lethargic  He appears toxic  He has a sickly appearance  He appears ill  Nasal cannula in place  HENT:   Mouth/Throat: Uvula is midline, oropharynx is clear and moist and mucous membranes are normal    Eyes: Pupils are equal, round, and reactive to light  Scleral icterus is present  Cardiovascular: Normal rate, regular rhythm and normal heart sounds  Pulses:       Radial pulses are 1+ on the right side, and 1+ on the left side  Dorsalis pedis pulses are 1+ on the right side, and 1+ on the left side  Pulmonary/Chest: Accessory muscle usage present  He has decreased breath sounds in the right lower field and the left lower field  Abdominal: Bowel sounds are absent  There is tenderness in the right upper quadrant and right lower quadrant  Neurological: He appears lethargic  GCS eye subscore is 4  GCS verbal subscore is 2  GCS motor subscore is 4     Skin:   Jaundiced  1+ edema LE       Vitals   Vitals:    19 0615 19 0626 19 0630 19 0636   BP:       BP Location:       Pulse:  92  86   Resp:  19  18   Temp: (!) 97 3 °F (36 3 °C)  (!) 97 3 °F (36 3 °C)    TempSrc: Bladder  Bladder    SpO2:  95%  96%   Weight:         Temp (24hrs), Av 1 °F (36 2 °C), Min:96 4 °F (35 8 °C), Max:98 4 °F (36 9 °C)  Current: Temperature: (!) 97 3 °F (36 3 °C)  HR 90, BP 98/55, RR 22, SaO2 98% on 2 L NC    Respiratory:  SpO2: SpO2: 96 %       Height and Weights      IBW: -88 kg  Body mass index is 24 34 kg/m²  Weight (last 2 days)     Date/Time   Weight    12/31/19 0557   94 3 (207 89)    12/30/19 0600   95 2 (209 88)    12/29/19 0214   95 3 (210 1)    12/29/19 0113   93 5 (206 13)              Intake and Output  I/O       12/29 0701 - 12/30 0700 12/30 0701 - 12/31 0700    P  O   1500    I V  (mL/kg) 7797 9 (81 9) 6197 6 (65 7)    IV Piggyback 2092  5 1903 8    Total Intake(mL/kg) 9890 4 (103 9) 9601 4 (101 8)    Urine (mL/kg/hr) 50 (0) 5 (0)    Other 8941 7903    Stool 100     Total Output 9091 7908    Net +799 4 +1693 4                  Nutrition       Diet Orders   (From admission, onward)             Start     Ordered    12/29/19 0212  Diet NPO  Diet effective now     Question Answer Comment   Diet Type NPO    RD to adjust diet per protocol?  Yes        12/29/19 0213                   Laboratory and Diagnostics:  Results from last 7 days   Lab Units 12/31/19  0436 12/30/19  0532 12/29/19  1005 12/29/19  0608 12/29/19  0422 12/28/19  2330 12/28/19  2145 12/28/19  2144   WBC Thousand/uL 54 54* 45 98*  --  46 36*  --   --   --  43 88*   HEMOGLOBIN g/dL 8 1* 8 1* 8 6* 8 6*  --   --   --  10 2*   I STAT HEMOGLOBIN g/dl  --   --   --   --   --  9 2* 11 9*  --    HEMATOCRIT % 23 8* 23 5* 25 2* 26 2*  --   --   --  32 6*   HEMATOCRIT, ISTAT %  --   --   --   --   --  27* 35*  --    PLATELETS Thousands/uL 466* 567*  --  667* 656*  --   --  811*   BANDS PCT %  --   --   --   --   --   --   --  10*   MONO PCT %  --  3*  --  9  --   --   --  7     Results from last 7 days   Lab Units 12/31/19  0436 12/30/19  2347 12/30/19  1801 12/30/19  1143 12/30/19  0532 12/30/19  0012 12/29/19  1802  12/29/19  0222   SODIUM mmol/L 139 140 140 137 136  136 137 135*   < > 126*   POTASSIUM mmol/L 3 9 4 0 3 7 3 9 4 1  4 1 4 3 4 6   < > 6 7*   CHLORIDE mmol/L 109* 110* 109* 106 105  105 105 102   < > 94*   CO2 mmol/L 20* 18* 19* 17* 18*  17* 17* 15*   < > 12*   ANION GAP mmol/L 10 12 12 14* 13  14* 15* 18*   < > 20*   BUN mg/dL 19 20 20 21 24  25 30* 32*   < > 53*   CREATININE mg/dL 2 71* 2 59* 2 62* 2 83* 2 98*  2 98* 3 04* 3 24*   < > 4 56*   CALCIUM mg/dL 8 6 8 5 8 6 8 5 8 4  8 4 8 2* 7 7*   < > 6 7*   GLUCOSE RANDOM mg/dL 88 100 107 93 75  78 88 77   < > 39*   ALT U/L 1,614*  --   --   --  2,005*  --  2,246*  --  2,338*   AST U/L 5,445*  --   --   --  9,165*  --  11,037*  --  13,166*   ALK PHOS U/L 1,151*  --   --   --  1,205*  --  1,411*  --  1,521*   ALBUMIN g/dL 1 8*  --   --   --  1 9*  --  2 0*  --  2 1*   TOTAL BILIRUBIN mg/dL 26 05*  --   --   --  26 74*  --  26 85*  --  32 51*    < > = values in this interval not displayed  Results from last 7 days   Lab Units 12/30/19  2347 12/30/19  1801 12/30/19  1143 12/30/19  0532 12/30/19  0012 12/29/19  1802 12/29/19  1214   MAGNESIUM mg/dL 2 0 1 9 2 1 2 0 2 0 2 0 2 3   PHOSPHORUS mg/dL 2 6* 3 5 4 2 4 9* 5 6* 6 5* 7 2*      Results from last 7 days   Lab Units 12/31/19  0436 12/30/19  0532 12/29/19  1215 12/29/19  0422 12/28/19  2144   INR  6 60* 10 30* >15 00* >15 00*  --    PTT seconds  --   --  85* 87* 83*      Results from last 7 days   Lab Units 12/29/19  1005 12/29/19  0608 12/29/19  0423 12/28/19  2144   TROPONIN I ng/mL 2 42* 1 14* 0 70* 0 21*     Results from last 7 days   Lab Units 12/29/19  2052 12/29/19  1802 12/29/19  1526 12/29/19  1214 12/29/19  0423 12/29/19  0008 12/28/19  2144   LACTIC ACID mmol/L 7 7* 7 5* 7 5* 7 3* 10 1* 8 4* 9 4*     Results from last 7 days   Lab Units 12/30/19  0757   PROCALCITONIN ng/ml 36 01*       Micro  Results from last 7 days   Lab Units 12/30/19  0544 12/30/19  0534 12/28/19  2246 12/28/19  2230   BLOOD CULTURE  Received in Microbiology Lab  Culture in Progress  Received in Microbiology Lab  Culture in Progress   Enterococcus faecalis* Enterococcus species*   GRAM STAIN RESULT   --   --  Gram positive cocci in pairs and chains* Gram positive cocci in pairs and chains*       EKG: SR on tele  Imaging:  None in past 24 hours    Active Medications  Scheduled Meds:  Current Facility-Administered Medications:  ampicillin 2,000 mg Intravenous Q8H Zheng Silva PA-C Last Rate: Stopped (12/31/19 0100)   artificial tear  Both Eyes BID Zheng Veronica PA-C    ascorbic acid in sodium chloride 0 9% 100 mL IVPB 1,500 mg Intravenous Q6H Yanna Suarez PA-C Last Rate: Stopped (12/31/19 0500)   dexmedetomidine 0 1-0 7 mcg/kg/hr Intravenous Titrated Prosper Garcia MD Last Rate: 0 6 mcg/kg/hr (12/31/19 0511)   hydrocortisone sodium succinate 50 mg Intravenous Q6H Zheng Veronica PA-C    ketamine 0 2 mg/kg/hr Intravenous Continuous Theodora Henderson MD Last Rate: 0 2 mg/kg/hr (12/31/19 0351)   Ketamine HCl 0 5 mg/kg Intravenous Q2H PRN Theodora Henderson MD    norepinephrine 1-30 mcg/min Intravenous Titrated Yanna Suarez PA-C Last Rate: 30 mcg/min (12/31/19 0550)   NxStage K 4/Ca 3 20,000 mL Dialysis Continuous Tiffany K Rubi, DO Last Rate: 0 mL (12/31/19 0430)   pantoprazole 40 mg Intravenous Q24H [address] Aamir Veronica PA-C    phenylephine  mcg/min Intravenous Titrated Zheng Veronica PA-C Last Rate: 150 mcg/min (12/31/19 0636)   dextrose 10 % and sodium chloride 0 9 % 100 mL/hr Intravenous Continuous Prosper Garcia MD Last Rate: 100 mL/hr (12/31/19 4753)   thiamine 200 mg Intravenous Q12H Yanna Suarez PA-C Last Rate: Stopped (12/30/19 2300)   vasopressin (PITRESSIN) in 0 9 % sodium chloride 100 mL 0 04 Units/min Intravenous Continuous Josesito Garcia MD Last Rate: 0 04 Units/min (12/30/19 1643)     Continuous Infusions:    dexmedetomidine 0 1-0 7 mcg/kg/hr Last Rate: 0 6 mcg/kg/hr (12/31/19 0511)   ketamine 0 2 mg/kg/hr Last Rate: 0 2 mg/kg/hr (12/31/19 0351)   norepinephrine 1-30 mcg/min Last Rate: 30 mcg/min (12/31/19 4646)   NxStage K 4/Ca 3 20,000 mL Last Rate: 0 mL (12/31/19 0430)   phenylephine  mcg/min Last Rate: 150 mcg/min (12/31/19 0636)   dextrose 10 % and sodium chloride 0 9 % 100 mL/hr Last Rate: 100 mL/hr (12/31/19 0212)   vasopressin (PITRESSIN) in 0 9 % sodium chloride 100 mL 0 04 Units/min Last Rate: 0 04 Units/min (12/30/19 2325)     PRN Meds:     Ketamine HCl 0 5 mg/kg Q2H PRN       Allergies   No Known Allergies    Advance Directive and Living Will:      Power of :    POLST:      Counseling / Coordination of Care  0 mins of critical care time    SHANA Macedo      Portions of the record may have been created with voice recognition software  Occasional wrong word or "sound a like" substitutions may have occurred due to the inherent limitations of voice recognition software    Read the chart carefully and recognize, using context, where substitutions have occurred

## 2019-12-31 NOTE — CONSULTS
Consultation - [address] [facility]  Paula Davis 34 y o  male MRN: [id number]  Unit/Bed#: MICU 06 Encounter: [id number]        Inpatient consult to gastroenterology     Performed by  Tommy Gil MD     Authorized by Leandro Barahona MD          Reason for Consult / Principal Problem:   Hepatocellular carcinoma, hepatic encephalopathy    ASSESSMENT AND PLAN:    Darby Echeverria is a 34 y o  old male with PMH: Hepatoblastoma dx in 2013, [address] ,  who presented with acute liver failure  #Acute Liver Failure  Patient presented with acute liver failure in the setting of known hepatocellular carcinoma which is progressing and further involvement of his liver parenchyma  Patient has had multiple bouts of chemotherapy with no significant improvement in his [address]  and continued to have an AFP elevation  In the setting of sepsis patient likely with acute on chronic liver failure  Will get right upper quadrant ultrasound to rule out PVT as patient is high risk for clots in the setting of malignancy  Work-up:  -qdaily LFTs  -q12hr INR  -Hepatitis [facility] on 12/5/19 at [facility] - negative   -APAP - negative  -follow-up acute hepatitis panel   -follow-up EBV, CMV, HSV   -anti smooth muscle antibody, antimitochondrial antibody - recently negative   -TC postivice 1:320    Plan:  -RUQ US with dopplers  -IV NAC   -initial loading dose of 150 mg/kg/hour over 1 hour    -followed by 12 5 mg/kg/hour for 4 hours   -then continuous infusion of 6 25 mg/kg for 67 hours  -lactulose via enema (350ml tap water mixed with 150ml of Lactulose given AZ q6-8hr)    #Transaminitis   Patient with acutely elevated LFTs  Likely in the setting of acute liver failure in this setting of progressive worsening hepatocellular carcinoma    Suspect patient may have had ischemic hepatitis component as well 2 given that he is recently found to be in a septic shock picture and likely had poor perfusion of his liver   -qdaily LFTs  -support patient's blood pressure    #Hepatic Encephalopathy  Avoid PO lactulose in setting of possible SBO  Lactulose enema as above  #Esophageal varices  Patient noted to have esophageal varices seen on imaging but has never had an upper endoscopy to diagnose  Patient is high risk for for significant coagulopathies hands care should be taken to ensure that no NG tubes are placed   -Hold beta-blocker in setting hypotension for sepsis    #Hepatocellular Carcinoma  #Hepatoblastoma  Patient was initially diagnosed with well-differentiated pass cell carcinoma in 2013  Was then found to have biopsy-proven recurrent hepatoblastoma in 2015   -liver biopsy 11/19 -  Hepatocellular carcinoma (moderately differentiated), liver parenchyma with mild sinus social dilation    #Enterococcal bacteremia  Likely in the setting of progression hepatocellular carcinoma  Will get right upper quadrant ultrasound   -Continue ampicillin  -appreciate ID recommendations    ______________________________________________________________________    HPI:    Patient initially presented to [facility] on 12/28 we presented from home with altered mental status and decreased urine output  Patient has CT abdomen pelvis which showed diffuse enlarged liver with lesions throughout and possible small bowel obstruction  Patient was evaluated by Nephrology who recommended transfer of patient to [facility] for the initiation CVVHD  Patient received temporary catheter and CVVHD was initiated  Patient was found have 14 cm mass in the right lobe of the liver on ultrasound in October 2013  At that time patient does have a AFP level of 53,000  Patient was found to have well-differentiated hepatocellular carcinoma at that time    Underwent an extended left hepatic lobectomy with bile duct resection, lymph node dissection and hepaticojejunostomy at the [facility] at which time it was determined the patient had hepatoblastoma that originated segment 4 of the low  Patient underwent for postoperative cycles of chemotherapy after that  In 2016 patient underwent TACE procedure x2  In 2016 to 2018 patient was part of a trial study  08/20/2018 he was started on new therapy Lenvatinib discontinued due to progression of the disease  Presently on Ramucirumab  REVIEW OF SYSTEMS:     unable to obtain    Historical Information   No past medical history on file    Past Surgical History:   Procedure Laterality Date    TOOTH EXTRACTION       Social History   Social History     Substance and Sexual Activity   Alcohol Use No    Frequency: Never    Binge frequency: Never    Comment: n/a     Social History     Substance and Sexual Activity   Drug Use No     Social History     Tobacco Use   Smoking Status Never Smoker   Smokeless Tobacco Never Used     Family History   Problem Relation Age of Onset    Diabetes Father     Breast cancer Maternal Grandmother     Breast cancer Paternal Grandmother     Ovarian cancer Paternal Grandmother     Stomach cancer Family        Meds/Allergies     Medications Prior to Admission   Medication    ibuprofen (MOTRIN) 200 mg tablet    Lenvatinib Mesylate (LENVIMA 12 MG DAILY DOSE PO)     Current Facility-Administered Medications   Medication Dose Route Frequency    ampicillin (OMNIPEN) 2,000 mg in sodium chloride 0 9 % 100 mL IVPB  2,000 mg Intravenous Q8H    artificial tear (LUBRIFRESH P M ) ophthalmic ointment   Both Eyes BID    ascorbic acid 1,500 mg in sodium chloride 0 9 % 100 mL IVPB  1,500 mg Intravenous Q6H    dexmedetomidine (PRECEDEX) 400 mcg in sodium chloride 0 9 % 100 mL infusion  0 1-0 7 mcg/kg/hr Intravenous Titrated    hydrocortisone sodium succinate (PF) (Solu-CORTEF) injection 50 mg  50 mg Intravenous Q6H    ketamine 250 mg (STANDARD CONCENTRATION) IV in sodium chloride 0 9% 250 mL  0 2 mg/kg/hr Intravenous Continuous    Ketamine HCl 50 mg  50 mg Intravenous Q2H PRN    metroNIDAZOLE (FLAGYL) IVPB (premix) 500 mg  500 mg Intravenous BID    norepinephrine (LEVOPHED) 8 mg (DOUBLE CONCENTRATION) IV in sodium chloride 0 9% 250 mL  1-30 mcg/min Intravenous Titrated    NxStage K 4/Ca 3 dialysis solution (RFP-401) 20,000 mL  20,000 mL Dialysis Continuous    pantoprazole (PROTONIX) injection 40 mg  40 mg Intravenous Q24H MENDEZ    phenylephrine (RYNE-SYNEPHRINE) 50 mg (STANDARD CONCENTRATION) in sodium chloride 0 9% 250 mL   mcg/min Intravenous Titrated    potassium chloride 20 mEq IVPB (premix)  20 mEq Intravenous Q2H    sodium chloride (concentrated) 154 mEq in dextrose 10 % 1,000 mL infusion  100 mL/hr Intravenous Continuous    thiamine (VITAMIN B1) 200 mg in sodium chloride 0 9 % 50 mL IVPB  200 mg Intravenous Q12H    vasopressin (PITRESSIN) 20 Units in sodium chloride 0 9 % 100 mL infusion  0 04 Units/min Intravenous Continuous     No Known Allergies    Objective     Blood pressure 135/58, pulse 92, temperature 98 °F (36 7 °C), temperature source Bladder, resp  rate 15, height 6' 6" (1 981 m), weight 94 3 kg (207 lb 14 3 oz), SpO2 97 %  Body mass index is 24 02 kg/m²  Intake/Output Summary (Last 24 hours) at 12/31/2019 1031  Last data filed at 12/31/2019 1013  Gross per 24 hour   Intake 7912 4 ml   Output 8246 ml   Net -333 6 ml       PHYSICAL EXAM:      General Appearance:   Alert, cooperative, no distress   HEENT:    scleral icterus   Neck:   Supple, symmetrical, trachea midline   Lungs:   Clear to auscultation bilaterally; no rales, rhonchi or wheezing; respirations unlabored    Heart:   Regular rate and rhythm; no murmur, rub, or gallop  Abdomen:     Old abdominal surgery scar    Belly soft but with hypoactive bowel sounds   Genitalia:   Deferred    Rectal:   Deferred    Extremities:  No cyanosis, clubbing or edema    Pulses:  2+ and symmetric all extremities    Skin:   diffusely jaundiced   Lymph nodes:  No palpable cervical lymphadenopathy      Lab Results:   No results displayed because visit has over 200 results  Imaging Studies: I have personally reviewed pertinent imaging studies  Ct Chest Abdomen Pelvis Wo Contrast    Result Date: 12/29/2019  Narrative: CT CHEST, ABDOMEN AND PELVIS WITHOUT IV CONTRAST INDICATION:   Shortness of breath  Weakness and fatigue  History of hepatoblastoma COMPARISON:  CT head, same day, CT chest dated 10/15/2013 TECHNIQUE: CT examination of the chest, abdomen and pelvis was performed without intravenous contrast   Axial, sagittal, and coronal 2D reformatted images were created from the source data and submitted for interpretation  Radiation dose length product (DLP) for this visit:  972 mGy-cm   This examination, like all CT scans performed in the [facility], was performed utilizing techniques to minimize radiation dose exposure, including the use of iterative reconstruction and automated exposure control  Enteric contrast was not administered  FINDINGS: CHEST LUNGS:  6 mm nodule in the right upper lobe (axial image 15, series 2), 3 mm nodule in the lateral right upper lobe (axial image 16, series 2), 4 mm nodule in the medial right apex (axial image 10, series 2)  Elevation of the right hemidiaphragm with a linear opacity in the right lower lung fields, probably atelectasis  Superimposed infection could be considered in the appropriate clinical setting  Lungs otherwise appear grossly clear  PLEURA:  Unremarkable  HEART/GREAT VESSELS:  Grossly unremarkable MEDIASTINUM AND AMOR:  Unremarkable  CHEST WALL AND LOWER NECK:   Unremarkable  ABDOMEN LIVER/BILIARY TREE:  The liver is diffusely enlarged and heterogeneous with innumerable focal low-density lesions seen seen throughout the liver, largest in the left hepatic lobe measures approximately 3 5 cm in size, largest in the right hepatic lobe measures approximately 6 cm in size  GALLBLADDER:  Not well seen or evaluated  SPLEEN:  Mildly enlarged  Splenic volume estimated at 650 mL  PANCREAS:  Unremarkable  ADRENAL GLANDS:  Unremarkable  KIDNEYS/URETERS:  Unremarkable  No hydronephrosis  STOMACH AND BOWEL:  There are multiple moderately dilated loops of small bowel seen in the mid and lower abdomen, largest measures approximately 4 8 cm in diameter  Some fecal appearing material seen within multiple small bowel loops  There is also relative underdistention of the distal small bowel and the colon although no discrete transition point is identified  Findings taken together are suspicious for small bowel obstruction  APPENDIX:  Normal caliber appendix  ABDOMINOPELVIC CAVITY:  Small amount of fluid is seen, probably reactive  No discrete intraperitoneal free air  Multiple postsurgical clips are seen in the upper and mid abdomen  No discrete bulky adenopathy is seen  VESSELS:  No aortic aneurysm  PELVIS REPRODUCTIVE ORGANS:  Unremarkable for patient's age  URINARY BLADDER:  Partially distended bladder  Mild circumferential bladder wall thickening noted, probably exaggerated by underdistention  ABDOMINAL WALL/INGUINAL REGIONS:  Body wall edema OSSEOUS STRUCTURES:  No acute fracture or destructive osseous lesion  Impression: Diffusely enlarged liver with innumerable hepatic lesions, correlates with the patient's history of hepatoblastoma  Multiple moderately dilated loops of small bowel seen in the mid and lower abdomen, largest measures approximately 4 8 cm in diameter  Some fecal appearing material seen within multiple small bowel loops  There is also relative underdistention of the distal small bowel and the colon although no discrete transition point is identified  Findings taken together are suspicious for small bowel obstruction  Small amount of fluid in the peritoneal cavity, probably reactive  Partially distended bladder  Mild circumferential bladder wall thickening noted, probably exaggerated by underdistention    Superimposed cystitis could be considered in the appropriate clinical setting  Multiple subcentimeter pulmonary nodules as described, pulmonary metastatic disease is the diagnosis of exclusion  Mild splenomegaly, body wall edema, and other findings as above  Findings discussed with Dr Jessica Thorpe by Dr Martin Daly at 2:15am on 12/29/2019 Workstation performed: [id number]     Ct Head Without Contrast    Result Date: 12/29/2019  Narrative: CT BRAIN - WITHOUT CONTRAST INDICATION:   Altered mental status  COMPARISON:  None  TECHNIQUE:  CT examination of the brain was performed  In addition to axial images, coronal 2D reformatted images were created and submitted for interpretation  Radiation dose length product (DLP) for this visit:  891 mGy-cm   This examination, like all CT scans performed in the [facility], was performed utilizing techniques to minimize radiation dose exposure, including the use of iterative reconstruction and automated exposure control  IMAGE QUALITY:  Diagnostic  FINDINGS: PARENCHYMA:  No intracranial mass, mass effect or midline shift  No CT signs of acute territorial infarction  No acute parenchymal hemorrhage  Gray-white differentiation appears maintained  VENTRICLES AND EXTRA-AXIAL SPACES:  Normal for the patient's age  VISUALIZED ORBITS AND PARANASAL SINUSES:  Mild mucosal thickening in the bilateral maxillary sinuses  Paranasal sinuses otherwise are grossly clear  The orbits appear intact  CALVARIUM AND EXTRACRANIAL SOFT TISSUES:  Normal      Impression: No acute intracranial abnormality is seen  Other findings as above  Workstation performed: [id number]     Xr Chest Portable Icu    Result Date: 12/29/2019  Narrative: CHEST INDICATION:   HD catheter placement  COMPARISON:  CT from yesterday EXAM PERFORMED/VIEWS:  XR CHEST PORTABLE ICU Images: 2 FINDINGS:  Dialysis catheter has been placed via a right internal jugular approach  The tip overlies the right mainstem bronchus, consistent with a position in the high right atrium    No pneumothorax is evident Heart shadow is obscured by adjacent opacity  Again, the right diaphragm is elevated  This is similar to previous  The minor fissure is just above the right diaphragm  This would be indicative of volume loss in the right middle and lower lobe  This is similar to previous  Impression: No application after catheter placement   Position appropriate Workstation performed: [id number]       ---------------------------------------------------  Note Electronically Signed By:    Guilford Opitz, MD [address] Gastroenterology Fellow PGY-4  PI #: [id number]

## 2019-12-31 NOTE — PLAN OF CARE
Problem: Prexisting or High Potential for Compromised Skin Integrity  Goal: Skin integrity is maintained or improved  Description  INTERVENTIONS:  - Identify patients at risk for skin breakdown  - Assess and monitor skin integrity  - Assess and monitor nutrition and hydration status  - Monitor labs   - Assess for incontinence   - Turn and reposition patient  - Assist with mobility/ambulation  - Relieve pressure over bony prominences  - Avoid friction and shearing  - Provide appropriate hygiene as needed including keeping skin clean and dry  - Evaluate need for skin moisturizer/barrier cream  - Collaborate with interdisciplinary team   - Patient/family teaching  - Consider wound care consult   Outcome: Progressing     Problem: Potential for Falls  Goal: Patient will remain free of falls  Description  INTERVENTIONS:  - Assess patient frequently for physical needs  -  Identify cognitive and physical deficits and behaviors that affect risk of falls    -  Palm fall precautions as indicated by assessment   - Educate patient/family on patient safety including physical limitations  - Instruct patient to call for assistance with activity based on assessment  - Modify environment to reduce risk of injury  - Consider OT/PT consult to assist with strengthening/mobility  12/31/2019 0305 by Yola Denson RN  Outcome: Progressing  12/31/2019 0301 by Yola Denson RN  Outcome: Progressing     Problem: GASTROINTESTINAL - ADULT  Goal: Minimal or absence of nausea and/or vomiting  Description  INTERVENTIONS:  - Administer IV fluids if ordered to ensure adequate hydration  - Maintain NPO status until nausea and vomiting are resolved  - Nasogastric tube if ordered  - Administer ordered antiemetic medications as needed  - Provide nonpharmacologic comfort measures as appropriate  - Advance diet as tolerated, if ordered  - Consider nutrition services referral to assist patient with adequate nutrition and appropriate food choices  Outcome: Progressing     Problem: GENITOURINARY - ADULT  Goal: Urinary catheter remains patent  Description  INTERVENTIONS:  - Assess patency of urinary catheter  - If patient has a chronic hernandez, consider changing catheter if non-functioning  - Follow guidelines for intermittent irrigation of non-functioning urinary catheter  Outcome: Progressing     Problem: METABOLIC, FLUID AND ELECTROLYTES - ADULT  Goal: Electrolytes maintained within normal limits  Description  INTERVENTIONS:  - Monitor labs and assess patient for signs and symptoms of electrolyte imbalances  - Administer electrolyte replacement as ordered  - Monitor response to electrolyte replacements, including repeat lab results as appropriate  - Instruct patient on fluid and nutrition as appropriate  Outcome: Progressing  Goal: Glucose maintained within target range  Description  INTERVENTIONS:  - Monitor Blood Glucose as ordered  - Assess for signs and symptoms of hyperglycemia and hypoglycemia  - Administer ordered medications to maintain glucose within target range  - Assess nutritional intake and initiate nutrition service referral as needed  Outcome: Progressing     Problem: NEUROSENSORY - ADULT  Goal: Achieves stable or improved neurological status  Description  INTERVENTIONS  - Monitor and report changes in neurological status  - Monitor vital signs such as temperature, blood pressure, glucose, and any other labs ordered   - Initiate measures to prevent increased intracranial pressure  - Monitor for seizure activity and implement precautions if appropriate      Outcome: Not Progressing  Goal: Achieves maximal functionality and self care  Description  INTERVENTIONS  - Monitor swallowing and airway patency with patient fatigue and changes in neurological status  - Encourage and assist patient to increase activity and self care     - Encourage visually impaired, hearing impaired and aphasic patients to use assistive/communication devices  Outcome: Not Progressing     Problem: CARDIOVASCULAR - ADULT  Goal: Maintains optimal cardiac output and hemodynamic stability  Description  INTERVENTIONS:  - Monitor I/O, vital signs and rhythm  - Monitor for S/S and trends of decreased cardiac output  - Administer and titrate ordered vasoactive medications to optimize hemodynamic stability  - Assess quality of pulses, skin color and temperature  - Assess for signs of decreased coronary artery perfusion  - Instruct patient to report change in severity of symptoms  Outcome: Not Progressing  Goal: Absence of cardiac dysrhythmias or at baseline rhythm  Description  INTERVENTIONS:  - Continuous cardiac monitoring, vital signs, obtain 12 lead EKG if ordered  - Administer antiarrhythmic and heart rate control medications as ordered  - Monitor electrolytes and administer replacement therapy as ordered  Outcome: Not Progressing     Problem: GASTROINTESTINAL - ADULT  Goal: Maintains or returns to baseline bowel function  Description  INTERVENTIONS:  - Assess bowel function  - Encourage oral fluids to ensure adequate hydration  - Administer IV fluids if ordered to ensure adequate hydration  - Administer ordered medications as needed  - Encourage mobilization and activity  - Consider nutritional services referral to assist patient with adequate nutrition and appropriate food choices  Outcome: Not Progressing  Goal: Maintains adequate nutritional intake  Description  INTERVENTIONS:  - Monitor percentage of each meal consumed  - Identify factors contributing to decreased intake, treat as appropriate  - Assist with meals as needed  - Monitor I&O, weight, and lab values if indicated  - Obtain nutrition services referral as needed  Outcome: Not Progressing     Problem: GENITOURINARY - ADULT  Goal: Maintains or returns to baseline urinary function  Description  INTERVENTIONS:  - Assess urinary function  - Encourage oral fluids to ensure adequate hydration if ordered  - Administer IV fluids as ordered to ensure adequate hydration  - Administer ordered medications as needed  - Offer frequent toileting  - Follow urinary retention protocol if ordered  Outcome: Not Progressing     Problem: METABOLIC, FLUID AND ELECTROLYTES - ADULT  Goal: Fluid balance maintained  Description  INTERVENTIONS:  - Monitor labs   - Monitor I/O and WT  - Instruct patient on fluid and nutrition as appropriate  - Assess for signs & symptoms of volume excess or deficit  Outcome: Not Progressing

## 2019-12-31 NOTE — PROGRESS NOTES
Procedure Note - Nephrology   Hossein Davis 34 y o  male MRN: [id number]  Unit/Bed#: MICU 06 Encounter: [id number]    Procedure now-CRRT(28302)  Assessment / Plan:  1  Severe acute kidney injury, present on admission, baseline serum creatinine 0 9 and December 19, 2019  -serum creatinine 5 on admission  -Flavio  likely multifactorial in the setting of prerenal/ATN/hepatorenal syndrome versus questionable TMA in the setting of VEGF inhibitor versus recent use of Nivolumab and Abemaciclib from October 16th to December 4, 2019  Nivolumab can cause immune mediated nephritis  Could also potentially have Coumadin nephropathy vs TLS  -continue Heart to monitor I/O  -patient remains in lactic acidosis  -patient seen examined by me on CVVHD today  On even UF, 4K bath  CVVHD was started on December 29, 2019  Will continue current prescription  Would consider change in potassium bath if potassium in serum higher than 5 5   -consent on file which mother has provided  -obtain urinalysis with microscopy, urine lytes if urine sample available  Unfortunately, still anuric     2  Lactic acidosis in setting of hypotension, liver dysfunction, malignancy - last lactate 7 7, will avoid UF for now, likely d/t underlying HCC/hepatoblastoma and liver failure       3  Severe life-threatening hyperkalemia-potassium is significantly improved on CRRT  Will continue 4 potassium bath for now as above  Initial like complex tachycardia overall improved with calcium gluconate, medical management     4  Hyponatremia-serum sodium 119 on i-STAT, 126 on CMP, has resolved on CRRT     5  Anemia - hemolysis smear showed positive schistocytes, Hgb 8 1 and stable, transfuse prn Hgb < 7     6  Severe shock-on multiple pressors, monitoring per ICU     7  Altered mental status in the setting of concern for hepatic encephalopathy versus sepsis-monitor on dialysis, management per primary team     8   Hyperuricemia-uric acid level 14 6 on admission, repeat uric acid 2 9 - resolved    Would pursue ongoing goals of care discussion  Subjective:   Patient seen examined by me on CVVHD at approximately 10:00 a m  Landen Lucas  goal even, blood pressure 96/44  The patient is very lethargic and does not answer questions  Family is updated at bedside  He is still anuric  No overnight events  Objective:     Vitals: Blood pressure 135/58, pulse 94, temperature 98 6 °F (37 °C), temperature source Bladder, resp  rate 17, height 6' 6" (1 981 m), weight 94 3 kg (207 lb 14 3 oz), SpO2 98 %  ,Body mass index is 24 02 kg/m²  Temp (24hrs), Av 4 °F (36 3 °C), Min:96 4 °F (35 8 °C), Max:98 6 °F (37 °C)      Weight (last 2 days)     Date/Time   Weight    19 0843   94 3 (207 89)    19 0557   94 3 (207 89)    19 0600   95 2 (209 88)    19 0214   95 3 (210 1)    19 0113   93 5 (206 13)                Intake/Output Summary (Last 24 hours) at 2019 1221  Last data filed at 2019 1130  Gross per 24 hour   Intake 7456 4 ml   Output 7640 ml   Net -183 6 ml     I/O last 24 hours: In: 9337 4 [I V :6778 7; IV Piggyback:2558 7]  Out: 9583 [Urine:5; Other:9578]        Physical Exam:   Physical Exam   Constitutional: No distress  Ill appearing, jaundiced, lethargic   HENT:   Head: Normocephalic and atraumatic  Mouth/Throat: No oropharyngeal exudate  Eyes: Right eye exhibits no discharge  Left eye exhibits no discharge  Scleral icterus is present  Neck: Neck supple  No thyromegaly present  Cardiovascular: Normal rate, regular rhythm and normal heart sounds  Pulmonary/Chest: Effort normal and breath sounds normal  He has no wheezes  He has no rales  Abdominal: Soft  Bowel sounds are normal  He exhibits distension  There is no tenderness  Genitourinary:   Genitourinary Comments: +hernandez   Musculoskeletal: Normal range of motion  He exhibits edema (b/l LE)  Neurological:   lethargic   Skin: Skin is warm and dry  No rash noted   He is not diaphoretic    +jaundice   Psychiatric:   Difficult to assess as patient drowsy   Vitals reviewed        Invasive Devices     Peripheral Intravenous Line            Peripheral IV 12/28/19 Left Arm 2 days    Peripheral IV 12/28/19 Right Antecubital 2 days    Peripheral IV 12/28/19 Right Forearm 2 days    Peripheral IV 12/30/19 Left Arm 1 day          Arterial Line            Arterial Line 12/29/19 Radial 2 days          Hemodialysis Catheter            HD Temporary Double Catheter 2 days          Drain            Urethral Catheter Temperature probe 2 days                Medications:    Scheduled Meds:  Current Facility-Administered Medications:  ampicillin 2,000 mg Intravenous [place] [address], PA-C Last Rate: Stopped (12/31/19 8196)   artificial tear  Both Eyes BID Ulysees Sandhoff Cross, PA-C    ascorbic acid in sodium chloride 0 9% 100 mL IVPB 1,500 mg Intravenous Q6H Ulysees Sandhoff Cross, PA-C Last Rate: 0 mg (12/31/19 0500)   dexmedetomidine 0 1-0 7 mcg/kg/hr Intravenous Titrated Josesito Garcia MD Last Rate: 0 6 mcg/kg/hr (12/31/19 0800)   hydrocortisone sodium succinate 50 mg Intravenous Q6H AGNIESZKA Arteaga-DOMINGA    ketamine 0 1 mg/kg/hr Intravenous Continuous Jillyn Merl, CRNP Last Rate: 0 1 mg/kg/hr (12/31/19 1028)   Ketamine HCl 50 mg Intravenous Q2H PRN Christina Serrano MD    metroNIDAZOLE 500 mg Intravenous BID Christina Serrano MD Last Rate: Stopped (12/31/19 1130)   norepinephrine 1-30 mcg/min Intravenous Titrated Ulysees Sandhoff Cross, PA-C Last Rate: 30 mcg/min (12/31/19 1045)   NxStage K 4/Ca 3 20,000 mL Dialysis Continuous Tiffany K Rubi, DO Last Rate: 0 mL (12/31/19 1150)   pantoprazole 40 mg Intravenous Q24H [address] AGNIESZKA Arteaga-DOMINGA    phenylephine  mcg/min Intravenous Titrated Ulysees Sandhoff Cross, PA-C Last Rate: 180 mcg/min (12/31/19 0843)   dextrose 10 % and sodium chloride 0 9 % 100 mL/hr Intravenous Continuous Maury Garcia MD Last Rate: 100 mL/hr (12/31/19 1153)   sodium phosphate 21 mmol Intravenous Once Jillyn Merl, CRKRISTAN Last Rate: 21 mmol (12/31/19 1127)   thiamine 200 mg Intravenous Q12H Maximiliano Villatoro PA-C Last Rate: Stopped (12/31/19 1100)   vasopressin (PITRESSIN) in 0 9 % sodium chloride 100 mL 0 04 Units/min Intravenous Continuous Minna Garcia MD Last Rate: 0 04 Units/min (12/31/19 0824)       PRN Meds: Ketamine HCl    Continuous Infusions:  dexmedetomidine 0 1-0 7 mcg/kg/hr Last Rate: 0 6 mcg/kg/hr (12/31/19 0800)   ketamine 0 1 mg/kg/hr Last Rate: 0 1 mg/kg/hr (12/31/19 1028)   norepinephrine 1-30 mcg/min Last Rate: 30 mcg/min (12/31/19 1045)   NxStage K 4/Ca 3 20,000 mL Last Rate: 0 mL (12/31/19 1150)   phenylephine  mcg/min Last Rate: 180 mcg/min (12/31/19 0843)   dextrose 10 % and sodium chloride 0 9 % 100 mL/hr Last Rate: 100 mL/hr (12/31/19 1153)   vasopressin (PITRESSIN) in 0 9 % sodium chloride 100 mL 0 04 Units/min Last Rate: 0 04 Units/min (12/31/19 0824)           LAB RESULTS:      Results from last 7 days   Lab Units 12/31/19  0553 12/31/19  0436 12/30/19  2347 12/30/19  1801 12/30/19  1143 12/30/19  0532 12/30/19  0012 12/29/19  1802  12/29/19  1005 12/29/19  0608 12/29/19  0422 12/29/19  0222  12/28/19  2330 12/28/19  2145  12/28/19  2144   WBC Thousand/uL  --  54 54*  --   --   --  45 98*  --   --   --   --  46 36*  --   --   --   --   --   --  43 88*   HEMOGLOBIN g/dL  --  8 1*  --   --   --  8 1*  --   --   --  8 6* 8 6*  --   --   --   --   --   --  10 2*   I STAT HEMOGLOBIN g/dl  --   --   --   --   --   --   --   --   --   --   --   --   --   --  9 2* 11 9*  --   --    HEMATOCRIT %  --  23 8*  --   --   --  23 5*  --   --   --  25 2* 26 2*  --   --   --   --   --   --  32 6*   HEMATOCRIT, ISTAT %  --   --   --   --   --   --   --   --   --   --   --   --   --   --  27* 35*  --   --    PLATELETS Thousands/uL  --  466*  --   --   --  567*  --   --   --   --  667* 656*  --   --   --   --   --  811*   LYMPHO PCT %  --  10*  --   --   --  2*  --   --   --   --  9*  --   --   --   --   --   --  6*   MONO PCT %  --  6  --   --   --  3*  --   --   --   --  9  --   --   --   --   --   --  7   EOS PCT %  --  1  --   --   --  0  --   --   --   --  0  --   --   --   --   --   --  0   POTASSIUM mmol/L 3 9 3 9 4 0 3 7 3 9 4 1  4 1 4 3 4 6   < >  --  6 2*  --  6 7*   < >  --   --   --   --    CHLORIDE mmol/L 109* 109* 110* 109* 106 105  105 105 102   < >  --  97*  --  94*   < >  --   --   --   --    CO2 mmol/L 18* 20* 18* 19* 17* 18*  17* 17* 15*   < >  --  12*  --  12*   < >  --   --   --   --    CO2, I-STAT mmol/L  --   --   --   --   --   --   --   --   --   --   --   --   --   --  12* 12*   < >  --    BUN mg/dL 19 19 20 20 21 24  25 30* 32*   < >  --  49*  --  53*   < >  --   --   --   --    CREATININE mg/dL 2 53* 2 71* 2 59* 2 62* 2 83* 2 98*  2 98* 3 04* 3 24*   < >  --  4 07*  --  4 56*   < >  --   --   --   --    CALCIUM mg/dL 8 4 8 6 8 5 8 6 8 5 8 4  8 4 8 2* 7 7*   < >  --  6 9*  --  6 7*   < >  --   --   --   --    ALK PHOS U/L  --  1,151*  --   --   --  1,205*  --  1,411*  --   --   --   --  1,521*  --   --   --   --   --    ALT U/L  --  1,614*  --   --   --  2,005*  --  2,246*  --   --   --   --  2,338*  --   --   --   --   --    AST U/L  --  5,445*  --   --   --  1,807*  --  11,037*  --   --   --   --  13,166*  --   --   --   --   --    GLUCOSE, ISTAT mg/dl  --   --   --   --   --   --   --   --   --   --   --   --   --   --  95 40*  --   --    MAGNESIUM mg/dL 2 0  --  2 0 1 9 2 1 2 0 2 0 2 0   < >  --   --   --  2 5  --   --   --   --   --    PHOSPHORUS mg/dL 1 3*  --  2 6* 3 5 4 2 4 9* 5 6* 6 5*   < >  --   --   --  12 6*  --   --   --   --  17 3*    < > = values in this interval not displayed  CUTURES:  Lab Results   Component Value Date    BLOODCX No Growth at 24 hrs  12/30/2019    BLOODCX Enterococcus faecalis (A) 12/28/2019    BLOODCX Enterococcus species (A) 12/28/2019                 Portions of the record may have been created with voice recognition software   Occasional wrong word or "sound a like" substitutions may have occurred due to the inherent limitations of voice recognition software  Read the chart carefully and recognize, using context, where substitutions have occurred  If you have any questions, please contact the dictating provider

## 2019-12-31 NOTE — CONSULTS
Consultation - Infectious Disease   Bessie Juan Antonio Davis 34 y o  male MRN: [id number]  Unit/Bed#: MICU 06 Encounter: [id number]      IMPRESSION & RECOMMENDATIONS:   Impression/Recommendations:  1  Septic shock, POA  Hypothermia, tachycardia, refractory hypotension  Due to # 2  No other appreciable source  On appropriate antibiotics  Suspect ongoing hemodynamic derangements, leukocytosis due to profound multiorgan system failure  Remains critically ill on 3 pressors, CVVHD  Rec:  · Continue ampicillin for now  · Follow temperatures closely  · Recheck CBC in a m  · Supportive care as per the primary service    2  Enterococcal bacteremia  Suspect GI versus biliary source in setting of progressive hepatocellular carcinoma  No piero abscess or biliary obstruction noted on CT  Consider endocarditis  No indwelling lines  Repeat blood cultures positive but only 1 of 2 sets and drawn after <36 hours of antibiotics  Rec:  · Continue ampicillin for now  · Recheck blood cultures in AM  · Follow up TTE    3  AZALIA  With hyperkalemia and acidosis  Likely multifactorial due to infection, profound dehydration  Consider also hepatorenal   On CVVHD  Rec:  · Dose adjust antibiotics for CVVHD  · Close renal follow-up ongoing    4  Shock liver  Likely multifactorial due to infection, profound hypotension and dehydration  In the setting of # 8  Transaminases improving slightly since admission but remained quite elevated  Rec:  · Supportive care as per the primary service  · Recheck CMP in a m     5   Acute encephalopathy  Likely multifactorial due to all of above  CT head negative  Remains encephalopathic  Rec:  · Supportive care as per the primary service  · Follow mental status closely    6  SBO  No NG tube placed due to coagulopathy  Conservative management ongoing per surgery  7   History of C  Diff  High risk for recurrence  On prophylactic IV Flagyl  8   Progressive hepatocellular carcinoma  Recent CT with progressive disease  On palliative Ramucircumab  The above impression and plan was discussed in detail with patient's family at the bedside as well as the [facility] service  John Conklin Antibiotics:  Ampicillin #2  Antibiotics #4  IV Flagyl #3    Thank you for this consultation  We will follow along with you  HISTORY OF PRESENT ILLNESS:  Reason for Consult:  Enterococcal bacteremia    HPI: Angie Lackey is a 34 y o  male with a history of recurrent hepato blastoma as well as hepatocellular carcinoma status post extensive treatment and surgical history, followed by pen  Was recently on Nivolumab/abemaciclib which was stopped 12/4 due to rising bilirubin and increasing size of his tumor  He was restarted on Ramucircumab on 12/19 which was noted to be palliative  He presented to [facility] on 12/29 for increased confusion and lack of urine output  His mother is his mother who was in nurses at the bedside and states for the approximately 5-7 days prior to presentation the patient had subacute onset of abdominal pain, fatigue, decreased appetite, and occasional bloody stools  On the day of presentation she noted he became increasingly weak and lethargic and she called EMS  Upon presentation he was noted to be hypothermic with tachycardia and hypotension  His labs revealed significantly elevated WBC as well as AZALIA, acidosis, and hyperkalemia with markedly elevated LFTs and ammonia  A CTA/P showed diffusely enlarged liver with multiple tumors  He had multiple moderately dilated loops of bowel  He was started on vancomycin and cefepime as well as urgent CVVHD  He also was started on pressors and admitted to the ICU  Since admission his blood cultures have come back growing enterococcus and his antibiotics were tailored to ampicillin  He continues to require 3 pressors and is on CVVHD  His WBC remains markedly elevated  We are asked to comment on further evaluation and management      In performing this consult, I have reviewed prior admission and outpatient visit records in detail  REVIEW OF SYSTEMS:  Unable to obtain as the patient is obtunded  A complete system-based review of systems is otherwise negative  PAST MEDICAL HISTORY:  No past medical history on file  Past Surgical History:   Procedure Laterality Date    TOOTH EXTRACTION         FAMILY HISTORY:  Non-contributory    SOCIAL HISTORY:  Social History     Substance and Sexual Activity   Alcohol Use No    Frequency: Never    Binge frequency: Never    Comment: n/a     Social History     Substance and Sexual Activity   Drug Use No     Social History     Tobacco Use   Smoking Status Never Smoker   Smokeless Tobacco Never Used       ALLERGIES:  No Known Allergies    MEDICATIONS:  All current active medications have been reviewed  PHYSICAL EXAM:  Vitals:  Temp:  [96 4 °F (35 8 °C)-98 8 °F (37 1 °C)] 98 1 °F (36 7 °C)  HR:  [80-96] 90  Resp:  [13-24] 17  SpO2:  [95 %-99 %] 98 %  Temp (24hrs), Av 5 °F (36 4 °C), Min:96 4 °F (35 8 °C), Max:98 8 °F (37 1 °C)  Current: Temperature: 98 1 °F (36 7 °C)     Physical Exam:  General:  Thin, chronically ill-appearing male, frankly jaundiced, in no acute distress  Eyes:  Conjunctive clear with no hemorrhages or effusions, scleral icterus  Oropharynx:  No ulcers, no lesions  Neck:  Supple, no lymphadenopathy  Lungs:  Ventilated breath sounds, no accessory muscle use  Cardiac:  Regular rate and rhythm, no murmurs  Abdomen:  Soft, non-tender, slightly distended  Extremities:  No peripheral cyanosis, clubbing, or edema  Skin:  No rashes, no ulcers  Neurological:  Limited exam due to lethargy    LABS, IMAGING, & OTHER STUDIES:  Lab Results:  I have personally reviewed pertinent labs    Results from last 7 days   Lab Units 19  1213 19  0553 19  0436  19  0532  19  1802  19  2330 19  2145   POTASSIUM mmol/L 4 6 3 9 3 9   < > 4 1  4 1   < > 4 6   < >  --   -- CHLORIDE mmol/L 111* 109* 109*   < > 105  105   < > 102   < >  --   --    CO2 mmol/L 18* 18* 20*   < > 18*  17*   < > 15*   < >  --   --    CO2, I-STAT mmol/L  --   --   --   --   --   --   --   --  12* 12*   BUN mg/dL 20 19 19   < > 24  25   < > 32*   < >  --   --    CREATININE mg/dL 2 41* 2 53* 2 71*   < > 2 98*  2 98*   < > 3 24*   < >  --   --    EGFR ml/min/1 73sq m 35 33 30   < > 27  27   < > 24   < > 16 14   GLUCOSE, ISTAT mg/dl  --   --   --   --   --   --   --   --  95 40*   CALCIUM mg/dL 8 5 8 4 8 6   < > 8 4  8 4   < > 7 7*   < >  --   --    AST U/L  --   --  5,445*  --  8,715*  --  11,037*   < >  --   --    ALT U/L  --   --  1,614*  --  2,005*  --  2,246*   < >  --   --    ALK PHOS U/L  --   --  1,151*  --  1,205*  --  1,411*   < >  --   --     < > = values in this interval not displayed  Results from last 7 days   Lab Units 12/31/19  0436 12/30/19  0532 12/29/19  1005 12/29/19  0608   WBC Thousand/uL 54 54* 45 98*  --  46 36*   HEMOGLOBIN g/dL 8 1* 8 1* 8 6* 8 6*   PLATELETS Thousands/uL 466* 567*  --  667*     Results from last 7 days   Lab Units 12/30/19  0544 12/30/19  0534 12/28/19  2246 12/28/19  2230   BLOOD CULTURE   --  No Growth at 24 hrs  Enterococcus faecalis* Enterococcus faecalis*   GRAM STAIN RESULT  Gram positive cocci in chains*  --  Gram positive cocci in pairs and chains* Gram positive cocci in pairs and chains*       Imaging Studies:   I have personally reviewed pertinent imaging study reports and images in PACS  CTA/P extensive liver tumors  No piero abscess or biliary obstruction  EKG, Pathology, and Other Studies:   I have personally reviewed pertinent reports

## 2019-12-31 NOTE — SOCIAL WORK
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[employer] , drive and employed  No DME  PCP is Dr Karol Burns is [facility], [address] [place] [address]  Pt has no hx with HHC, STR, alc, drug and IP psych tx  CM will follow  CM reviewed d/c planning process including the following: identifying help at home, patient preference for d/c planning needs, Discharge Lounge, [facility] Meds to Bed program, availability of treatment team to discuss questions or concerns patient and/or family may have regarding understanding medications and recognizing signs and symptoms once discharged  CM also encouraged patient to follow up with all recommended appointments after discharge  Patient advised of importance for patient and family to participate in managing patients medical well being

## 2019-12-31 NOTE — OCCUPATIONAL THERAPY NOTE
OT CANCEL NOTE    OT orders received  Chart reviewed  Per discussion w/ RN, pt remains medically unstable to engage in skilled OT evaluation at this time  Will D/C OT  Please re-consult once medically appropriate  Thanks       Ashley WINTERS, OTR/L

## 2020-01-01 ENCOUNTER — APPOINTMENT (INPATIENT)
Dept: RADIOLOGY | Facility: HOSPITAL | Age: 30
DRG: 441 | End: 2020-01-01
Payer: COMMERCIAL

## 2020-01-01 ENCOUNTER — APPOINTMENT (INPATIENT)
Dept: NON INVASIVE DIAGNOSTICS | Facility: HOSPITAL | Age: 30
DRG: 441 | End: 2020-01-01
Payer: COMMERCIAL

## 2020-01-01 VITALS
OXYGEN SATURATION: 98 % | SYSTOLIC BLOOD PRESSURE: 72 MMHG | WEIGHT: 210.32 LBS | HEIGHT: 78 IN | TEMPERATURE: 100 F | DIASTOLIC BLOOD PRESSURE: 43 MMHG | HEART RATE: 78 BPM | RESPIRATION RATE: 4 BRPM | BODY MASS INDEX: 24.33 KG/M2

## 2020-01-01 PROBLEM — D64.9 ANEMIA: Status: ACTIVE | Noted: 2020-01-01

## 2020-01-01 LAB
ACTIN IGG SERPL-ACNC: 25 UNITS (ref 0–19)
ALBUMIN SERPL BCP-MCNC: 1.9 G/DL (ref 3.5–5)
ALBUMIN SERPL BCP-MCNC: 2.1 G/DL (ref 3.5–5)
ALP SERPL-CCNC: 1038 U/L (ref 46–116)
ALP SERPL-CCNC: 1061 U/L (ref 46–116)
ALT SERPL W P-5'-P-CCNC: 1274 U/L (ref 12–78)
ALT SERPL W P-5'-P-CCNC: 980 U/L (ref 12–78)
AMMONIA PLAS-SCNC: 56 UMOL/L (ref 11–35)
AMMONIA PLAS-SCNC: 57 UMOL/L (ref 11–35)
ANA HOMOGEN SER QL IF: NORMAL
ANA HOMOGEN TITR SER: NORMAL {TITER}
ANION GAP SERPL CALCULATED.3IONS-SCNC: 10 MMOL/L (ref 4–13)
ANION GAP SERPL CALCULATED.3IONS-SCNC: 11 MMOL/L (ref 4–13)
ANION GAP SERPL CALCULATED.3IONS-SCNC: 12 MMOL/L (ref 4–13)
ANION GAP SERPL CALCULATED.3IONS-SCNC: 14 MMOL/L (ref 4–13)
ANION GAP SERPL CALCULATED.3IONS-SCNC: 17 MMOL/L (ref 4–13)
ANISOCYTOSIS BLD QL SMEAR: PRESENT
ANISOCYTOSIS BLD QL SMEAR: PRESENT
AST SERPL W P-5'-P-CCNC: 2423 U/L (ref 5–45)
AST SERPL W P-5'-P-CCNC: 3477 U/L (ref 5–45)
BACTERIA BLD CULT: ABNORMAL
BACTERIA BLD CULT: ABNORMAL
BACTERIA UR QL AUTO: ABNORMAL /HPF
BASE EXCESS BLDA CALC-SCNC: -4 MMOL/L (ref -2–3)
BASOPHILS # BLD MANUAL: 0 THOUSAND/UL (ref 0–0.1)
BASOPHILS NFR MAR MANUAL: 0 % (ref 0–1)
BASOPHILS NFR MAR MANUAL: 0 % (ref 0–1)
BILIRUB DIRECT SERPL-MCNC: 23.98 MG/DL (ref 0–0.2)
BILIRUB DIRECT SERPL-MCNC: 24.33 MG/DL (ref 0–0.2)
BILIRUB SERPL-MCNC: 28.45 MG/DL (ref 0.2–1)
BILIRUB SERPL-MCNC: 29.08 MG/DL (ref 0.2–1)
BILIRUB UR QL STRIP: ABNORMAL
BUN SERPL-MCNC: 16 MG/DL (ref 5–25)
BUN SERPL-MCNC: 17 MG/DL (ref 5–25)
BUN SERPL-MCNC: 17 MG/DL (ref 5–25)
BUN SERPL-MCNC: 18 MG/DL (ref 5–25)
BURR CELLS BLD QL SMEAR: PRESENT
BURR CELLS BLD QL SMEAR: PRESENT
CA-I BLD-SCNC: 0.98 MMOL/L (ref 1.12–1.32)
CA-I BLD-SCNC: 1.07 MMOL/L (ref 1.12–1.32)
CA-I BLD-SCNC: 1.08 MMOL/L (ref 1.12–1.32)
CA-I BLD-SCNC: 1.11 MMOL/L (ref 1.12–1.32)
CA-I BLD-SCNC: 1.12 MMOL/L (ref 1.12–1.32)
CA-I BLD-SCNC: 1.12 MMOL/L (ref 1.12–1.32)
CA-I BLD-SCNC: 1.13 MMOL/L (ref 1.12–1.32)
CA-I BLD-SCNC: 1.14 MMOL/L (ref 1.12–1.32)
CA-I BLD-SCNC: 1.2 MMOL/L (ref 1.12–1.32)
CALCIUM SERPL-MCNC: 8.1 MG/DL (ref 8.3–10.1)
CALCIUM SERPL-MCNC: 8.1 MG/DL (ref 8.3–10.1)
CALCIUM SERPL-MCNC: 8.5 MG/DL (ref 8.3–10.1)
CALCIUM SERPL-MCNC: 8.6 MG/DL (ref 8.3–10.1)
CALCIUM SERPL-MCNC: 8.9 MG/DL (ref 8.3–10.1)
CALCIUM SERPL-MCNC: 9.1 MG/DL (ref 8.3–10.1)
CHLORIDE SERPL-SCNC: 106 MMOL/L (ref 100–108)
CHLORIDE SERPL-SCNC: 107 MMOL/L (ref 100–108)
CHLORIDE SERPL-SCNC: 108 MMOL/L (ref 100–108)
CHLORIDE UR-SCNC: 96 MMOL/L
CLARITY UR: CLEAR
CO2 SERPL-SCNC: 17 MMOL/L (ref 21–32)
CO2 SERPL-SCNC: 17 MMOL/L (ref 21–32)
CO2 SERPL-SCNC: 20 MMOL/L (ref 21–32)
CO2 SERPL-SCNC: 21 MMOL/L (ref 21–32)
COLOR UR: ABNORMAL
CREAT SERPL-MCNC: 2.08 MG/DL (ref 0.6–1.3)
CREAT SERPL-MCNC: 2.15 MG/DL (ref 0.6–1.3)
CREAT SERPL-MCNC: 2.18 MG/DL (ref 0.6–1.3)
CREAT SERPL-MCNC: 2.25 MG/DL (ref 0.6–1.3)
CREAT SERPL-MCNC: 2.44 MG/DL (ref 0.6–1.3)
CREAT SERPL-MCNC: 2.44 MG/DL (ref 0.6–1.3)
CREAT SERPL-MCNC: 2.45 MG/DL (ref 0.6–1.3)
CREAT SERPL-MCNC: 2.48 MG/DL (ref 0.6–1.3)
CREAT UR-MCNC: 14.9 MG/DL
EOSINOPHIL # BLD MANUAL: 0 THOUSAND/UL (ref 0–0.4)
EOSINOPHIL NFR BLD MANUAL: 0 % (ref 0–6)
EOSINOPHIL NFR BLD MANUAL: 0 % (ref 0–6)
ERYTHROCYTE [DISTWIDTH] IN BLOOD BY AUTOMATED COUNT: 23 % (ref 11.6–15.1)
ERYTHROCYTE [DISTWIDTH] IN BLOOD BY AUTOMATED COUNT: 23.5 % (ref 11.6–15.1)
GFR SERPL CREATININE-BSD FRML MDRD: 34 ML/MIN/1.73SQ M
GFR SERPL CREATININE-BSD FRML MDRD: 38 ML/MIN/1.73SQ M
GFR SERPL CREATININE-BSD FRML MDRD: 39 ML/MIN/1.73SQ M
GFR SERPL CREATININE-BSD FRML MDRD: 40 ML/MIN/1.73SQ M
GFR SERPL CREATININE-BSD FRML MDRD: 42 ML/MIN/1.73SQ M
GLUCOSE SERPL-MCNC: 100 MG/DL (ref 65–140)
GLUCOSE SERPL-MCNC: 108 MG/DL (ref 65–140)
GLUCOSE SERPL-MCNC: 111 MG/DL (ref 65–140)
GLUCOSE SERPL-MCNC: 118 MG/DL (ref 65–140)
GLUCOSE SERPL-MCNC: 125 MG/DL (ref 65–140)
GLUCOSE SERPL-MCNC: 129 MG/DL (ref 65–140)
GLUCOSE SERPL-MCNC: 129 MG/DL (ref 65–140)
GLUCOSE SERPL-MCNC: 135 MG/DL (ref 65–140)
GLUCOSE SERPL-MCNC: 138 MG/DL (ref 65–140)
GLUCOSE SERPL-MCNC: 144 MG/DL (ref 65–140)
GLUCOSE SERPL-MCNC: 147 MG/DL (ref 65–140)
GLUCOSE SERPL-MCNC: 57 MG/DL (ref 65–140)
GLUCOSE SERPL-MCNC: 75 MG/DL (ref 65–140)
GLUCOSE SERPL-MCNC: 79 MG/DL (ref 65–140)
GLUCOSE SERPL-MCNC: 79 MG/DL (ref 65–140)
GLUCOSE SERPL-MCNC: 88 MG/DL (ref 65–140)
GLUCOSE SERPL-MCNC: 90 MG/DL (ref 65–140)
GLUCOSE SERPL-MCNC: 91 MG/DL (ref 65–140)
GLUCOSE SERPL-MCNC: 97 MG/DL (ref 65–140)
GLUCOSE SERPL-MCNC: 97 MG/DL (ref 65–140)
GLUCOSE SERPL-MCNC: 98 MG/DL (ref 65–140)
GLUCOSE UR STRIP-MCNC: ABNORMAL MG/DL
GRAM STN SPEC: ABNORMAL
GRAM STN SPEC: ABNORMAL
HAV IGM SER QL: NORMAL
HBV CORE IGM SER QL: NORMAL
HBV SURFACE AG SER QL: NORMAL
HCO3 BLDA-SCNC: 21.5 MMOL/L (ref 22–28)
HCT VFR BLD AUTO: 23.2 % (ref 36.5–49.3)
HCT VFR BLD AUTO: 23.7 % (ref 36.5–49.3)
HCT VFR BLD CALC: 26 % (ref 36.5–49.3)
HCV AB SER QL: NORMAL
HGB BLD-MCNC: 7.8 G/DL (ref 12–17)
HGB BLD-MCNC: 7.9 G/DL (ref 12–17)
HGB BLDA-MCNC: 8.8 G/DL (ref 12–17)
HGB UR QL STRIP.AUTO: ABNORMAL
INR PPP: 4.56 (ref 0.84–1.19)
INR PPP: 4.82 (ref 0.84–1.19)
KETONES UR STRIP-MCNC: ABNORMAL MG/DL
LACTATE SERPL-SCNC: 6.7 MMOL/L (ref 0.5–2)
LEUKOCYTE ESTERASE UR QL STRIP: ABNORMAL
LYMPHOCYTES # BLD AUTO: 0.75 THOUSAND/UL (ref 0.6–4.47)
LYMPHOCYTES # BLD AUTO: 1 % (ref 14–44)
LYMPHOCYTES # BLD AUTO: 4 % (ref 14–44)
MAGNESIUM SERPL-MCNC: 1.8 MG/DL (ref 1.6–2.6)
MAGNESIUM SERPL-MCNC: 2 MG/DL (ref 1.6–2.6)
MAGNESIUM SERPL-MCNC: 2.1 MG/DL (ref 1.6–2.6)
MAGNESIUM SERPL-MCNC: 2.1 MG/DL (ref 1.6–2.6)
MAGNESIUM SERPL-MCNC: 2.2 MG/DL (ref 1.6–2.6)
MCH RBC QN AUTO: 27.5 PG (ref 26.8–34.3)
MCH RBC QN AUTO: 28.6 PG (ref 26.8–34.3)
MCHC RBC AUTO-ENTMCNC: 33.3 G/DL (ref 31.4–37.4)
MCHC RBC AUTO-ENTMCNC: 33.6 G/DL (ref 31.4–37.4)
MCV RBC AUTO: 83 FL (ref 82–98)
MCV RBC AUTO: 85 FL (ref 82–98)
METAMYELOCYTES NFR BLD MANUAL: 4 % (ref 0–1)
METAMYELOCYTES NFR BLD MANUAL: 4 % (ref 0–1)
MITOCHONDRIA M2 IGG SER-ACNC: <20 UNITS (ref 0–20)
MONOCYTES # BLD AUTO: 6.04 THOUSAND/UL (ref 0–1.22)
MONOCYTES NFR BLD: 1 % (ref 4–12)
MONOCYTES NFR BLD: 8 % (ref 4–12)
MYELOCYTES NFR BLD MANUAL: 1 % (ref 0–1)
MYELOCYTES NFR BLD MANUAL: 4 % (ref 0–1)
NEUTROPHILS # BLD MANUAL: 60.35 THOUSAND/UL (ref 1.85–7.62)
NEUTS BAND NFR BLD MANUAL: 1 % (ref 0–8)
NEUTS BAND NFR BLD MANUAL: 3 % (ref 0–8)
NEUTS SEG NFR BLD AUTO: 77 % (ref 43–75)
NEUTS SEG NFR BLD AUTO: 84 % (ref 43–75)
NITRITE UR QL STRIP: ABNORMAL
NON-SQ EPI CELLS URNS QL MICRO: ABNORMAL /HPF
NRBC BLD AUTO-RTO: 14 /100 WBC (ref 0–2)
NRBC BLD AUTO-RTO: 9 /100 WBCS
PCO2 BLD: 23 MMOL/L (ref 21–32)
PCO2 BLD: 42.5 MM HG (ref 36–44)
PH BLD: 7.31 [PH] (ref 7.35–7.45)
PH UR STRIP.AUTO: ABNORMAL [PH]
PHOSPHATE SERPL-MCNC: 3.2 MG/DL (ref 2.7–4.5)
PHOSPHATE SERPL-MCNC: 3.5 MG/DL (ref 2.7–4.5)
PHOSPHATE SERPL-MCNC: 3.5 MG/DL (ref 2.7–4.5)
PHOSPHATE SERPL-MCNC: 3.7 MG/DL (ref 2.7–4.5)
PHOSPHATE SERPL-MCNC: 3.8 MG/DL (ref 2.7–4.5)
PHOSPHATE SERPL-MCNC: 3.9 MG/DL (ref 2.7–4.5)
PHOSPHATE SERPL-MCNC: 3.9 MG/DL (ref 2.7–4.5)
PHOSPHATE SERPL-MCNC: 4.2 MG/DL (ref 2.7–4.5)
PLATELET # BLD AUTO: 182 THOUSANDS/UL (ref 149–390)
PLATELET # BLD AUTO: 335 THOUSANDS/UL (ref 149–390)
PLATELET BLD QL SMEAR: ADEQUATE
PLATELET BLD QL SMEAR: ADEQUATE
PMV BLD AUTO: 10.1 FL (ref 8.9–12.7)
PMV BLD AUTO: 10.7 FL (ref 8.9–12.7)
PO2 BLD: 92 MM HG (ref 75–129)
POIKILOCYTOSIS BLD QL SMEAR: PRESENT
POIKILOCYTOSIS BLD QL SMEAR: PRESENT
POLYCHROMASIA BLD QL SMEAR: PRESENT
POLYCHROMASIA BLD QL SMEAR: PRESENT
POTASSIUM BLD-SCNC: 4.6 MMOL/L (ref 3.5–5.3)
POTASSIUM SERPL-SCNC: 4.4 MMOL/L (ref 3.5–5.3)
POTASSIUM SERPL-SCNC: 4.7 MMOL/L (ref 3.5–5.3)
POTASSIUM SERPL-SCNC: 4.7 MMOL/L (ref 3.5–5.3)
POTASSIUM SERPL-SCNC: 4.8 MMOL/L (ref 3.5–5.3)
POTASSIUM SERPL-SCNC: 4.9 MMOL/L (ref 3.5–5.3)
POTASSIUM SERPL-SCNC: 5.1 MMOL/L (ref 3.5–5.3)
PROMYELOCYTES NFR BLD MANUAL: 1 % (ref 0–0)
PROMYELOCYTES NFR BLD MANUAL: 2 % (ref 0–0)
PROT SERPL-MCNC: 4.6 G/DL (ref 6.4–8.2)
PROT SERPL-MCNC: 4.9 G/DL (ref 6.4–8.2)
PROT UR STRIP-MCNC: ABNORMAL MG/DL
PROTHROMBIN TIME: 42.7 SECONDS (ref 11.6–14.5)
PROTHROMBIN TIME: 44.6 SECONDS (ref 11.6–14.5)
RBC # BLD AUTO: 2.73 MILLION/UL (ref 3.88–5.62)
RBC # BLD AUTO: 2.87 MILLION/UL (ref 3.88–5.62)
RBC #/AREA URNS AUTO: ABNORMAL /HPF
RBC MORPH BLD: PRESENT
RBC MORPH BLD: PRESENT
RYE IGE QN: POSITIVE
SAO2 % BLD FROM PO2: 96 % (ref 60–85)
SODIUM 24H UR-SCNC: 102 MOL/L
SODIUM BLD-SCNC: 137 MMOL/L (ref 136–145)
SODIUM SERPL-SCNC: 136 MMOL/L (ref 136–145)
SODIUM SERPL-SCNC: 137 MMOL/L (ref 136–145)
SODIUM SERPL-SCNC: 137 MMOL/L (ref 136–145)
SODIUM SERPL-SCNC: 138 MMOL/L (ref 136–145)
SODIUM SERPL-SCNC: 138 MMOL/L (ref 136–145)
SODIUM SERPL-SCNC: 139 MMOL/L (ref 136–145)
SODIUM SERPL-SCNC: 139 MMOL/L (ref 136–145)
SODIUM SERPL-SCNC: 141 MMOL/L (ref 136–145)
SP GR UR STRIP.AUTO: 1.02 (ref 1–1.03)
SPECIMEN SOURCE: ABNORMAL
TARGETS BLD QL SMEAR: PRESENT
TOTAL CELLS COUNTED SPEC: 100
TOXIC GRANULES BLD QL SMEAR: PRESENT
UROBILINOGEN UR QL STRIP.AUTO: ABNORMAL E.U./DL
UUN 24H UR-MCNC: 44 MG/DL
VARIANT LYMPHS # BLD AUTO: 1 %
VARIANT LYMPHS # BLD AUTO: 4 %
WBC # BLD AUTO: 75.44 THOUSAND/UL (ref 4.31–10.16)
WBC # BLD AUTO: 80.22 THOUSAND/UL (ref 4.31–10.16)
WBC #/AREA URNS AUTO: ABNORMAL /HPF
WBC TOXIC VACUOLES BLD QL SMEAR: PRESENT

## 2020-01-01 PROCEDURE — 84300 ASSAY OF URINE SODIUM: CPT | Performed by: INTERNAL MEDICINE

## 2020-01-01 PROCEDURE — 82948 REAGENT STRIP/BLOOD GLUCOSE: CPT

## 2020-01-01 PROCEDURE — 80048 BASIC METABOLIC PNL TOTAL CA: CPT | Performed by: INTERNAL MEDICINE

## 2020-01-01 PROCEDURE — 84100 ASSAY OF PHOSPHORUS: CPT | Performed by: INTERNAL MEDICINE

## 2020-01-01 PROCEDURE — 90945 DIALYSIS ONE EVALUATION: CPT | Performed by: INTERNAL MEDICINE

## 2020-01-01 PROCEDURE — 82140 ASSAY OF AMMONIA: CPT | Performed by: NURSE PRACTITIONER

## 2020-01-01 PROCEDURE — 99232 SBSQ HOSP IP/OBS MODERATE 35: CPT | Performed by: INTERNAL MEDICINE

## 2020-01-01 PROCEDURE — 82330 ASSAY OF CALCIUM: CPT | Performed by: EMERGENCY MEDICINE

## 2020-01-01 PROCEDURE — 85610 PROTHROMBIN TIME: CPT | Performed by: NURSE PRACTITIONER

## 2020-01-01 PROCEDURE — 86039 ANTINUCLEAR ANTIBODIES (ANA): CPT | Performed by: STUDENT IN AN ORGANIZED HEALTH CARE EDUCATION/TRAINING PROGRAM

## 2020-01-01 PROCEDURE — 82947 ASSAY GLUCOSE BLOOD QUANT: CPT

## 2020-01-01 PROCEDURE — 80076 HEPATIC FUNCTION PANEL: CPT | Performed by: NURSE PRACTITIONER

## 2020-01-01 PROCEDURE — 82803 BLOOD GASES ANY COMBINATION: CPT

## 2020-01-01 PROCEDURE — NC001 PR NO CHARGE: Performed by: NURSE PRACTITIONER

## 2020-01-01 PROCEDURE — 86038 ANTINUCLEAR ANTIBODIES: CPT | Performed by: STUDENT IN AN ORGANIZED HEALTH CARE EDUCATION/TRAINING PROGRAM

## 2020-01-01 PROCEDURE — 93306 TTE W/DOPPLER COMPLETE: CPT

## 2020-01-01 PROCEDURE — 84132 ASSAY OF SERUM POTASSIUM: CPT

## 2020-01-01 PROCEDURE — 82436 ASSAY OF URINE CHLORIDE: CPT | Performed by: INTERNAL MEDICINE

## 2020-01-01 PROCEDURE — 86235 NUCLEAR ANTIGEN ANTIBODY: CPT | Performed by: STUDENT IN AN ORGANIZED HEALTH CARE EDUCATION/TRAINING PROGRAM

## 2020-01-01 PROCEDURE — 36556 INSERT NON-TUNNEL CV CATH: CPT | Performed by: NURSE PRACTITIONER

## 2020-01-01 PROCEDURE — 85007 BL SMEAR W/DIFF WBC COUNT: CPT | Performed by: EMERGENCY MEDICINE

## 2020-01-01 PROCEDURE — C9113 INJ PANTOPRAZOLE SODIUM, VIA: HCPCS | Performed by: PHYSICIAN ASSISTANT

## 2020-01-01 PROCEDURE — 99291 CRITICAL CARE FIRST HOUR: CPT | Performed by: PHYSICIAN ASSISTANT

## 2020-01-01 PROCEDURE — 99222 1ST HOSP IP/OBS MODERATE 55: CPT | Performed by: INTERNAL MEDICINE

## 2020-01-01 PROCEDURE — 06HY33Z INSERTION OF INFUSION DEVICE INTO LOWER VEIN, PERCUTANEOUS APPROACH: ICD-10-PCS | Performed by: INTERNAL MEDICINE

## 2020-01-01 PROCEDURE — 82330 ASSAY OF CALCIUM: CPT | Performed by: INTERNAL MEDICINE

## 2020-01-01 PROCEDURE — 93306 TTE W/DOPPLER COMPLETE: CPT | Performed by: INTERNAL MEDICINE

## 2020-01-01 PROCEDURE — 83735 ASSAY OF MAGNESIUM: CPT | Performed by: INTERNAL MEDICINE

## 2020-01-01 PROCEDURE — 84295 ASSAY OF SERUM SODIUM: CPT

## 2020-01-01 PROCEDURE — 99238 HOSP IP/OBS DSCHRG MGMT 30/<: CPT | Performed by: PHYSICIAN ASSISTANT

## 2020-01-01 PROCEDURE — 83605 ASSAY OF LACTIC ACID: CPT | Performed by: NURSE PRACTITIONER

## 2020-01-01 PROCEDURE — 86256 FLUORESCENT ANTIBODY TITER: CPT | Performed by: STUDENT IN AN ORGANIZED HEALTH CARE EDUCATION/TRAINING PROGRAM

## 2020-01-01 PROCEDURE — 82330 ASSAY OF CALCIUM: CPT

## 2020-01-01 PROCEDURE — 84540 ASSAY OF URINE/UREA-N: CPT | Performed by: INTERNAL MEDICINE

## 2020-01-01 PROCEDURE — 85027 COMPLETE CBC AUTOMATED: CPT | Performed by: EMERGENCY MEDICINE

## 2020-01-01 PROCEDURE — 83735 ASSAY OF MAGNESIUM: CPT | Performed by: EMERGENCY MEDICINE

## 2020-01-01 PROCEDURE — 80048 BASIC METABOLIC PNL TOTAL CA: CPT | Performed by: EMERGENCY MEDICINE

## 2020-01-01 PROCEDURE — 85014 HEMATOCRIT: CPT

## 2020-01-01 PROCEDURE — 87040 BLOOD CULTURE FOR BACTERIA: CPT | Performed by: INTERNAL MEDICINE

## 2020-01-01 PROCEDURE — 85027 COMPLETE CBC AUTOMATED: CPT | Performed by: NURSE PRACTITIONER

## 2020-01-01 PROCEDURE — 82570 ASSAY OF URINE CREATININE: CPT | Performed by: INTERNAL MEDICINE

## 2020-01-01 PROCEDURE — 90945 DIALYSIS ONE EVALUATION: CPT

## 2020-01-01 PROCEDURE — 99233 SBSQ HOSP IP/OBS HIGH 50: CPT | Performed by: INTERNAL MEDICINE

## 2020-01-01 PROCEDURE — 85007 BL SMEAR W/DIFF WBC COUNT: CPT | Performed by: NURSE PRACTITIONER

## 2020-01-01 PROCEDURE — 5A1D90Z PERFORMANCE OF URINARY FILTRATION, CONTINUOUS, GREATER THAN 18 HOURS PER DAY: ICD-10-PCS | Performed by: INTERNAL MEDICINE

## 2020-01-01 PROCEDURE — 81001 URINALYSIS AUTO W/SCOPE: CPT | Performed by: INTERNAL MEDICINE

## 2020-01-01 PROCEDURE — 84100 ASSAY OF PHOSPHORUS: CPT | Performed by: EMERGENCY MEDICINE

## 2020-01-01 RX ORDER — MAGNESIUM SULFATE 1 G/100ML
1 INJECTION INTRAVENOUS ONCE
Status: COMPLETED | OUTPATIENT
Start: 2020-01-01 | End: 2020-01-01

## 2020-01-01 RX ORDER — DEXTROSE MONOHYDRATE 25 G/50ML
25 INJECTION, SOLUTION INTRAVENOUS ONCE
Status: COMPLETED | OUTPATIENT
Start: 2020-01-01 | End: 2020-01-01

## 2020-01-01 RX ORDER — GLYCOPYRROLATE 0.2 MG/ML
0.2 INJECTION INTRAMUSCULAR; INTRAVENOUS
Status: DISCONTINUED | OUTPATIENT
Start: 2020-01-01 | End: 2020-01-01 | Stop reason: HOSPADM

## 2020-01-01 RX ORDER — KETAMINE HCL IN NACL, ISO-OSM 100MG/10ML
50 SYRINGE (ML) INJECTION
Status: DISCONTINUED | OUTPATIENT
Start: 2020-01-01 | End: 2020-01-01 | Stop reason: HOSPADM

## 2020-01-01 RX ORDER — CALCIUM GLUCONATE 20 MG/ML
1 INJECTION, SOLUTION INTRAVENOUS ONCE
Status: COMPLETED | OUTPATIENT
Start: 2020-01-01 | End: 2020-01-01

## 2020-01-01 RX ORDER — DEXTROSE MONOHYDRATE 25 G/50ML
INJECTION, SOLUTION INTRAVENOUS
Status: COMPLETED
Start: 2020-01-01 | End: 2020-01-01

## 2020-01-01 RX ORDER — FENTANYL CITRATE 50 UG/ML
50 INJECTION, SOLUTION INTRAMUSCULAR; INTRAVENOUS
Status: DISCONTINUED | OUTPATIENT
Start: 2020-01-01 | End: 2020-01-01 | Stop reason: HOSPADM

## 2020-01-01 RX ORDER — KETAMINE HCL IN NACL, ISO-OSM 100MG/10ML
20 SYRINGE (ML) INJECTION ONCE
Status: COMPLETED | OUTPATIENT
Start: 2020-01-01 | End: 2020-01-01

## 2020-01-01 RX ORDER — LORAZEPAM 2 MG/ML
0.5 INJECTION INTRAMUSCULAR
Status: DISCONTINUED | OUTPATIENT
Start: 2020-01-01 | End: 2020-01-01 | Stop reason: HOSPADM

## 2020-01-01 RX ORDER — CALCIUM GLUCONATE 20 MG/ML
2 INJECTION, SOLUTION INTRAVENOUS ONCE
Status: COMPLETED | OUTPATIENT
Start: 2020-01-01 | End: 2020-01-01

## 2020-01-01 RX ORDER — KETAMINE HCL IN NACL, ISO-OSM 100MG/10ML
50 SYRINGE (ML) INJECTION ONCE
Status: COMPLETED | OUTPATIENT
Start: 2020-01-01 | End: 2020-01-01

## 2020-01-01 RX ADMIN — CALCIUM GLUCONATE 1 G: 20 INJECTION, SOLUTION INTRAVENOUS at 06:33

## 2020-01-01 RX ADMIN — PHENYLEPHRINE HYDROCHLORIDE 140 MCG/MIN: 10 INJECTION INTRAVENOUS at 01:33

## 2020-01-01 RX ADMIN — Medication 50 MG: at 07:36

## 2020-01-01 RX ADMIN — FENTANYL CITRATE 50 MCG: 50 INJECTION, SOLUTION INTRAMUSCULAR; INTRAVENOUS at 05:00

## 2020-01-01 RX ADMIN — NOREPINEPHRINE BITARTRATE 56.3 ML/HR: 1 INJECTION INTRAVENOUS at 09:01

## 2020-01-01 RX ADMIN — LORAZEPAM 0.5 MG: 2 INJECTION INTRAMUSCULAR; INTRAVENOUS at 19:33

## 2020-01-01 RX ADMIN — WHITE PETROLATUM 57.7 %-MINERAL OIL 31.9 % EYE OINTMENT: at 17:12

## 2020-01-01 RX ADMIN — DEXMEDETOMIDINE 0.7 MCG/KG/HR: 100 INJECTION, SOLUTION, CONCENTRATE INTRAVENOUS at 04:36

## 2020-01-01 RX ADMIN — ASCORBIC ACID 1500 MG: 500 INJECTION, SOLUTION INTRAMUSCULAR; INTRAVENOUS; SUBCUTANEOUS at 18:59

## 2020-01-01 RX ADMIN — METRONIDAZOLE 500 MG: 500 INJECTION, SOLUTION INTRAVENOUS at 17:44

## 2020-01-01 RX ADMIN — AMPICILLIN SODIUM 2000 MG: 2 INJECTION, POWDER, FOR SOLUTION INTRAMUSCULAR; INTRAVENOUS at 03:39

## 2020-01-01 RX ADMIN — METRONIDAZOLE 500 MG: 500 INJECTION, SOLUTION INTRAVENOUS at 08:42

## 2020-01-01 RX ADMIN — HYDROCORTISONE SODIUM SUCCINATE 50 MG: 100 INJECTION, POWDER, FOR SOLUTION INTRAMUSCULAR; INTRAVENOUS at 15:52

## 2020-01-01 RX ADMIN — DEXMEDETOMIDINE 0.7 MCG/KG/HR: 100 INJECTION, SOLUTION, CONCENTRATE INTRAVENOUS at 17:15

## 2020-01-01 RX ADMIN — FENTANYL CITRATE 50 MCG: 50 INJECTION, SOLUTION INTRAMUSCULAR; INTRAVENOUS at 06:44

## 2020-01-01 RX ADMIN — SODIUM CHLORIDE 100 ML/HR: 234 INJECTION INTRAMUSCULAR; INTRAVENOUS; SUBCUTANEOUS at 00:53

## 2020-01-01 RX ADMIN — DEXMEDETOMIDINE 0.7 MCG/KG/HR: 100 INJECTION, SOLUTION, CONCENTRATE INTRAVENOUS at 10:41

## 2020-01-01 RX ADMIN — ASCORBIC ACID 1500 MG: 500 INJECTION, SOLUTION INTRAMUSCULAR; INTRAVENOUS; SUBCUTANEOUS at 04:14

## 2020-01-01 RX ADMIN — WHITE PETROLATUM 57.7 %-MINERAL OIL 31.9 % EYE OINTMENT: at 08:41

## 2020-01-01 RX ADMIN — NOREPINEPHRINE BITARTRATE 56.3 ML/HR: 1 INJECTION INTRAVENOUS at 19:02

## 2020-01-01 RX ADMIN — KETAMINE HYDROCHLORIDE 0.2 MG/KG/HR: 50 INJECTION, SOLUTION INTRAMUSCULAR; INTRAVENOUS at 04:52

## 2020-01-01 RX ADMIN — FENTANYL CITRATE 50 MCG: 50 INJECTION, SOLUTION INTRAMUSCULAR; INTRAVENOUS at 03:58

## 2020-01-01 RX ADMIN — Medication 20000 ML: at 13:38

## 2020-01-01 RX ADMIN — DEXMEDETOMIDINE 0.7 MCG/KG/HR: 100 INJECTION, SOLUTION, CONCENTRATE INTRAVENOUS at 22:14

## 2020-01-01 RX ADMIN — WHITE PETROLATUM 57.7 %-MINERAL OIL 31.9 % EYE OINTMENT: at 17:44

## 2020-01-01 RX ADMIN — Medication 50 MG: at 02:00

## 2020-01-01 RX ADMIN — SODIUM CHLORIDE 50 ML/HR: 234 INJECTION INTRAMUSCULAR; INTRAVENOUS; SUBCUTANEOUS at 14:46

## 2020-01-01 RX ADMIN — DEXMEDETOMIDINE 0.7 MCG/KG/HR: 100 INJECTION, SOLUTION, CONCENTRATE INTRAVENOUS at 10:15

## 2020-01-01 RX ADMIN — AMPICILLIN SODIUM 2000 MG: 2 INJECTION, POWDER, FOR SOLUTION INTRAMUSCULAR; INTRAVENOUS at 16:01

## 2020-01-01 RX ADMIN — Medication 50 MG: at 19:34

## 2020-01-01 RX ADMIN — ASCORBIC ACID 1500 MG: 500 INJECTION, SOLUTION INTRAMUSCULAR; INTRAVENOUS; SUBCUTANEOUS at 12:10

## 2020-01-01 RX ADMIN — Medication 50 MG: at 02:33

## 2020-01-01 RX ADMIN — Medication 20 MG: at 15:39

## 2020-01-01 RX ADMIN — AMPICILLIN SODIUM 2000 MG: 2 INJECTION, POWDER, FOR SOLUTION INTRAMUSCULAR; INTRAVENOUS at 17:08

## 2020-01-01 RX ADMIN — HYDROCORTISONE SODIUM SUCCINATE 50 MG: 100 INJECTION, POWDER, FOR SOLUTION INTRAMUSCULAR; INTRAVENOUS at 08:42

## 2020-01-01 RX ADMIN — Medication 20000 ML: at 07:31

## 2020-01-01 RX ADMIN — DEXTROSE 50 % IN WATER (D50W) INTRAVENOUS SYRINGE 25 ML: at 13:20

## 2020-01-01 RX ADMIN — FENTANYL CITRATE 50 MCG: 50 INJECTION, SOLUTION INTRAMUSCULAR; INTRAVENOUS at 14:15

## 2020-01-01 RX ADMIN — Medication 50 MG: at 19:27

## 2020-01-01 RX ADMIN — Medication 20000 ML: at 11:09

## 2020-01-01 RX ADMIN — NOREPINEPHRINE BITARTRATE 30 MCG/MIN: 1 INJECTION INTRAVENOUS at 09:49

## 2020-01-01 RX ADMIN — AMPICILLIN SODIUM 2000 MG: 2 INJECTION, POWDER, FOR SOLUTION INTRAMUSCULAR; INTRAVENOUS at 10:14

## 2020-01-01 RX ADMIN — KETAMINE HYDROCHLORIDE 0.2 MG/KG/HR: 50 INJECTION, SOLUTION INTRAMUSCULAR; INTRAVENOUS at 00:19

## 2020-01-01 RX ADMIN — CALCIUM GLUCONATE 1 G: 20 INJECTION, SOLUTION INTRAVENOUS at 13:57

## 2020-01-01 RX ADMIN — NOREPINEPHRINE BITARTRATE 56.3 ML/HR: 1 INJECTION INTRAVENOUS at 04:16

## 2020-01-01 RX ADMIN — AMPICILLIN SODIUM 2000 MG: 2 INJECTION, POWDER, FOR SOLUTION INTRAMUSCULAR; INTRAVENOUS at 03:18

## 2020-01-01 RX ADMIN — ASCORBIC ACID 1500 MG: 500 INJECTION, SOLUTION INTRAMUSCULAR; INTRAVENOUS; SUBCUTANEOUS at 22:50

## 2020-01-01 RX ADMIN — FENTANYL CITRATE 50 MCG: 50 INJECTION, SOLUTION INTRAMUSCULAR; INTRAVENOUS at 11:54

## 2020-01-01 RX ADMIN — MAGNESIUM SULFATE HEPTAHYDRATE 1 G: 1 INJECTION, SOLUTION INTRAVENOUS at 01:20

## 2020-01-01 RX ADMIN — ASCORBIC ACID 1500 MG: 500 INJECTION, SOLUTION INTRAMUSCULAR; INTRAVENOUS; SUBCUTANEOUS at 04:08

## 2020-01-01 RX ADMIN — PHENYLEPHRINE HYDROCHLORIDE 150 MCG/MIN: 10 INJECTION INTRAVENOUS at 19:06

## 2020-01-01 RX ADMIN — FENTANYL CITRATE 50 MCG: 50 INJECTION, SOLUTION INTRAMUSCULAR; INTRAVENOUS at 08:43

## 2020-01-01 RX ADMIN — SODIUM CHLORIDE 100 ML/HR: 234 INJECTION INTRAMUSCULAR; INTRAVENOUS; SUBCUTANEOUS at 12:04

## 2020-01-01 RX ADMIN — METRONIDAZOLE 500 MG: 500 INJECTION, SOLUTION INTRAVENOUS at 08:09

## 2020-01-01 RX ADMIN — Medication 100 MCG/HR: at 23:36

## 2020-01-01 RX ADMIN — NOREPINEPHRINE BITARTRATE 20 MCG/MIN: 1 INJECTION INTRAVENOUS at 16:04

## 2020-01-01 RX ADMIN — HYDROCORTISONE SODIUM SUCCINATE 50 MG: 100 INJECTION, POWDER, FOR SOLUTION INTRAMUSCULAR; INTRAVENOUS at 20:18

## 2020-01-01 RX ADMIN — VASOPRESSIN 0.04 UNITS/MIN: 20 INJECTION INTRAVENOUS at 16:02

## 2020-01-01 RX ADMIN — CALCIUM GLUCONATE 1 G: 20 INJECTION, SOLUTION INTRAVENOUS at 13:50

## 2020-01-01 RX ADMIN — ACETYLCYSTEINE 580 MG: 200 INJECTION, SOLUTION INTRAVENOUS at 01:22

## 2020-01-01 RX ADMIN — DEXMEDETOMIDINE 0.7 MCG/KG/HR: 100 INJECTION, SOLUTION, CONCENTRATE INTRAVENOUS at 00:37

## 2020-01-01 RX ADMIN — AMPICILLIN SODIUM 2000 MG: 2 INJECTION, POWDER, FOR SOLUTION INTRAMUSCULAR; INTRAVENOUS at 09:28

## 2020-01-01 RX ADMIN — HYDROCORTISONE SODIUM SUCCINATE 50 MG: 100 INJECTION, POWDER, FOR SOLUTION INTRAMUSCULAR; INTRAVENOUS at 14:27

## 2020-01-01 RX ADMIN — WHITE PETROLATUM 57.7 %-MINERAL OIL 31.9 % EYE OINTMENT: at 08:22

## 2020-01-01 RX ADMIN — KETAMINE HYDROCHLORIDE 0.2 MG/KG/HR: 50 INJECTION, SOLUTION INTRAMUSCULAR; INTRAVENOUS at 15:42

## 2020-01-01 RX ADMIN — PHENYLEPHRINE HYDROCHLORIDE 180 MCG/MIN: 10 INJECTION INTRAVENOUS at 05:44

## 2020-01-01 RX ADMIN — GLYCOPYRROLATE 0.2 MG: 0.2 INJECTION, SOLUTION INTRAMUSCULAR; INTRAVENOUS at 19:33

## 2020-01-01 RX ADMIN — NOREPINEPHRINE BITARTRATE 30 MCG/MIN: 1 INJECTION INTRAVENOUS at 04:45

## 2020-01-01 RX ADMIN — FENTANYL CITRATE 50 MCG: 50 INJECTION, SOLUTION INTRAMUSCULAR; INTRAVENOUS at 02:33

## 2020-01-01 RX ADMIN — NOREPINEPHRINE BITARTRATE 56.3 ML/HR: 1 INJECTION INTRAVENOUS at 14:06

## 2020-01-01 RX ADMIN — Medication 25 MCG/HR: at 16:35

## 2020-01-01 RX ADMIN — CALCIUM GLUCONATE 1 G: 20 INJECTION, SOLUTION INTRAVENOUS at 01:37

## 2020-01-01 RX ADMIN — PHENYLEPHRINE HYDROCHLORIDE 170 MCG/MIN: 10 INJECTION INTRAVENOUS at 13:43

## 2020-01-01 RX ADMIN — PHENYLEPHRINE HYDROCHLORIDE 180 MCG/MIN: 10 INJECTION INTRAVENOUS at 10:27

## 2020-01-01 RX ADMIN — HYDROCORTISONE SODIUM SUCCINATE 50 MG: 100 INJECTION, POWDER, FOR SOLUTION INTRAMUSCULAR; INTRAVENOUS at 08:09

## 2020-01-01 RX ADMIN — Medication 20000 ML: at 22:25

## 2020-01-01 RX ADMIN — PHENYLEPHRINE HYDROCHLORIDE 180 MCG/MIN: 10 INJECTION INTRAVENOUS at 03:31

## 2020-01-01 RX ADMIN — Medication 100 MCG/HR: at 15:55

## 2020-01-01 RX ADMIN — HYDROCORTISONE SODIUM SUCCINATE 50 MG: 100 INJECTION, POWDER, FOR SOLUTION INTRAMUSCULAR; INTRAVENOUS at 03:03

## 2020-01-01 RX ADMIN — VASOPRESSIN 0.04 UNITS/MIN: 20 INJECTION INTRAVENOUS at 12:40

## 2020-01-01 RX ADMIN — Medication 50 MG: at 04:52

## 2020-01-01 RX ADMIN — FENTANYL CITRATE 50 MCG: 50 INJECTION, SOLUTION INTRAMUSCULAR; INTRAVENOUS at 19:33

## 2020-01-01 RX ADMIN — Medication 100 MCG/HR: at 05:21

## 2020-01-01 RX ADMIN — AMPICILLIN SODIUM 2000 MG: 2 INJECTION, POWDER, FOR SOLUTION INTRAMUSCULAR; INTRAVENOUS at 21:14

## 2020-01-01 RX ADMIN — CALCIUM GLUCONATE 1 G: 20 INJECTION, SOLUTION INTRAVENOUS at 19:38

## 2020-01-01 RX ADMIN — SODIUM CHLORIDE 100 ML/HR: 234 INJECTION INTRAMUSCULAR; INTRAVENOUS; SUBCUTANEOUS at 01:40

## 2020-01-01 RX ADMIN — ACETYLCYSTEINE 9540 MG: 200 INJECTION, SOLUTION INTRAVENOUS at 10:18

## 2020-01-01 RX ADMIN — CALCIUM GLUCONATE 2 G: 20 INJECTION, SOLUTION INTRAVENOUS at 06:33

## 2020-01-01 RX ADMIN — PANTOPRAZOLE SODIUM 40 MG: 40 INJECTION, POWDER, FOR SOLUTION INTRAVENOUS at 08:08

## 2020-01-01 RX ADMIN — Medication 100 MCG/HR: at 04:47

## 2020-01-01 RX ADMIN — NOREPINEPHRINE BITARTRATE 25 MCG/MIN: 1 INJECTION INTRAVENOUS at 10:27

## 2020-01-01 RX ADMIN — KETAMINE HYDROCHLORIDE 0.2 MG/KG/HR: 50 INJECTION, SOLUTION INTRAMUSCULAR; INTRAVENOUS at 19:55

## 2020-01-01 RX ADMIN — THIAMINE HYDROCHLORIDE 200 MG: 100 INJECTION, SOLUTION INTRAMUSCULAR; INTRAVENOUS at 22:12

## 2020-01-01 RX ADMIN — THIAMINE HYDROCHLORIDE 200 MG: 100 INJECTION, SOLUTION INTRAMUSCULAR; INTRAVENOUS at 11:08

## 2020-01-01 RX ADMIN — VASOPRESSIN 0.04 UNITS/MIN: 20 INJECTION INTRAVENOUS at 07:28

## 2020-01-01 RX ADMIN — FENTANYL CITRATE 50 MCG: 50 INJECTION, SOLUTION INTRAMUSCULAR; INTRAVENOUS at 01:14

## 2020-01-01 RX ADMIN — NOREPINEPHRINE BITARTRATE 30 MCG/MIN: 1 INJECTION INTRAVENOUS at 00:19

## 2020-01-01 RX ADMIN — FENTANYL CITRATE 50 MCG: 50 INJECTION, SOLUTION INTRAMUSCULAR; INTRAVENOUS at 19:27

## 2020-01-01 RX ADMIN — PANTOPRAZOLE SODIUM 40 MG: 40 INJECTION, POWDER, FOR SOLUTION INTRAVENOUS at 08:42

## 2020-01-01 RX ADMIN — FENTANYL CITRATE 50 MCG: 50 INJECTION, SOLUTION INTRAMUSCULAR; INTRAVENOUS at 00:19

## 2020-01-01 RX ADMIN — Medication 20000 ML: at 03:00

## 2020-01-01 RX ADMIN — DEXTROSE MONOHYDRATE 25 ML: 25 INJECTION, SOLUTION INTRAVENOUS at 13:20

## 2020-01-01 RX ADMIN — VASOPRESSIN 0.04 UNITS/MIN: 20 INJECTION INTRAVENOUS at 22:12

## 2020-01-01 RX ADMIN — CALCIUM GLUCONATE 3 G: 98 INJECTION, SOLUTION INTRAVENOUS at 02:14

## 2020-01-01 RX ADMIN — FENTANYL CITRATE 50 MCG: 50 INJECTION, SOLUTION INTRAMUSCULAR; INTRAVENOUS at 16:54

## 2020-01-01 RX ADMIN — Medication 50 MG: at 23:47

## 2020-01-01 RX ADMIN — DEXMEDETOMIDINE 0.7 MCG/KG/HR: 100 INJECTION, SOLUTION, CONCENTRATE INTRAVENOUS at 06:26

## 2020-01-01 RX ADMIN — Medication 50 MG: at 14:27

## 2020-01-01 RX ADMIN — VASOPRESSIN 0.04 UNITS/MIN: 20 INJECTION INTRAVENOUS at 02:58

## 2020-01-01 RX ADMIN — Medication 50 MG: at 02:15

## 2020-01-01 RX ADMIN — FENTANYL CITRATE 50 MCG: 50 INJECTION, SOLUTION INTRAMUSCULAR; INTRAVENOUS at 15:57

## 2020-01-01 RX ADMIN — NOREPINEPHRINE BITARTRATE 56.3 ML/HR: 1 INJECTION INTRAVENOUS at 23:42

## 2020-01-01 RX ADMIN — Medication 50 MG: at 19:38

## 2020-01-01 RX ADMIN — PHENYLEPHRINE HYDROCHLORIDE 180 MCG/MIN: 10 INJECTION INTRAVENOUS at 09:01

## 2020-01-01 RX ADMIN — HYDROCORTISONE SODIUM SUCCINATE 50 MG: 100 INJECTION, POWDER, FOR SOLUTION INTRAMUSCULAR; INTRAVENOUS at 03:17

## 2020-01-01 RX ADMIN — METRONIDAZOLE 500 MG: 500 INJECTION, SOLUTION INTRAVENOUS at 18:12

## 2020-01-01 RX ADMIN — DEXMEDETOMIDINE 0.7 MCG/KG/HR: 100 INJECTION, SOLUTION, CONCENTRATE INTRAVENOUS at 02:58

## 2020-01-01 RX ADMIN — Medication 20000 ML: at 15:43

## 2020-01-01 NOTE — PROGRESS NOTES
Procedure Note - Nephrology   Mariah Davis 34 y o  male MRN: [id number]  Unit/Bed#: MICU 06 Encounter: [id number]    Procedure note-CRRT(24247)  Assessment / Plan:  1  Severe acute kidney injury, present on admission, baseline serum creatinine 0 9 and December 19, 2019  -serum creatinine 5 on admission  -Flavio  likely multifactorial in the setting of prerenal/ATN/hepatorenal syndrome versus questionable TMA in the setting of VEGF inhibitor versus recent use of Nivolumab and Abemaciclib from October 16th to December 4, 2019  Nivolumab can cause immune mediated nephritis   Could also potentially have Coumadin nephropathy vs TLS  -continue Heart to monitor I/O  -patient remains in lactic acidosis with only slight improvement this morning   -patient seen examined by me on CVVHD today at 9:50am   On even UF, 4K bath   CVVHD was started on December 29, 2019  Will continue current prescription   Would consider change in potassium bath if potassium in serum higher than 4 5   -consent on file which mother has provided  -obtain urinalysis with microscopy, urine lytes if urine sample available  Unfortunately, still anuric     2  Lactic acidosis in setting of hypotension, liver dysfunction, malignancy - last lactate 6 7, will avoid UF for now, likely d/t underlying HCC/hepatoblastoma and liver failure       3  Severe life-threatening hyperkalemia-potassium is significantly improved on CRRT   Will continue 4 potassium bath for now as above   Initial wide complex tachycardia overall improved with calcium gluconate, medical management     4  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 - hemolysis smear showed positive schistocytes 12/29/19, Hgb 7 9, transfuse prn Hgb < 7  Of note, platelets normal range      5  Severe shock-on multiple pressors, monitoring per ICU     6  Altered mental status in the setting of concern for hepatic encephalopathy versus sepsis-monitor on dialysis, management per primary team  Ammonia level improving  7  Elevated anion gap metabolic acidosis - AG 14, bicarb 17, will increase rate of dialysate to 3000ml/hr to correct this  May be in part driven by lactic acidosis  8  Elevated LFTs - improving slowly but bilirubin higher  Poor prognosis       Would pursue ongoing goals of care discussion  Subjective:   Patient seen examined by me on CRRT at approximately 10:00 a m     Blood pressure 98/56  Patient has required higher dose pressors overnight  Family is at bedside and updated  Objective:     Vitals: Blood pressure (!) 72/43, pulse 98, temperature 97 7 °F (36 5 °C), resp  rate (!) 9, height 6' 6" (1 981 m), weight 95 8 kg (211 lb 3 2 oz), SpO2 100 %  ,Body mass index is 24 41 kg/m²  Temp (24hrs), Av 8 °F (36 6 °C), Min:97 °F (36 1 °C), Max:98 8 °F (37 1 °C)      Weight (last 2 days)     Date/Time   Weight    20 0530   95 8 (211 2)    19 0843   94 3 (207 89)    19 0557   94 3 (207 89)    19 0600   95 2 (209 88)                Intake/Output Summary (Last 24 hours) at 2020 1035  Last data filed at 2020 0800  Gross per 24 hour   Intake 8699 9 ml   Output 6736 ml   Net 1963 9 ml     I/O last 24 hours: In: 9737 4 [I V :5670 2; IV Piggyback:4067 2]  Out: 12 [Urine:5; Other:7702]        Physical Exam:   Physical Exam   Constitutional: No distress  Chronically ill appearing, lethargic, jaundiced   HENT:   Head: Normocephalic and atraumatic  Mouth/Throat: No oropharyngeal exudate  Eyes: Right eye exhibits no discharge  Left eye exhibits no discharge  Scleral icterus is present  Neck: Neck supple  No thyromegaly present  Cardiovascular: Normal rate, regular rhythm and normal heart sounds  Pulmonary/Chest: Effort normal  He has no wheezes  He has no rales  Coarse BS b/l   Abdominal: Soft  Bowel sounds are normal  He exhibits no distension  There is no tenderness  Genitourinary:   Genitourinary Comments: +Heart   Musculoskeletal: He exhibits edema (b/l LE)  Neurological:   Lethargic, moans but does not speak   Skin: Skin is warm and dry  No rash noted  He is not diaphoretic    +jaundice   Psychiatric:   Unable to assess due to lethargy   Vitals reviewed        Invasive Devices     Peripheral Intravenous Line            Peripheral IV 12/28/19 Right Antecubital 3 days    Peripheral IV 12/28/19 Right Forearm 3 days    Peripheral IV 12/31/19 Right;Upper Arm less than 1 day    Peripheral IV 12/31/19 Right;Upper Arm less than 1 day          Arterial Line            Arterial Line 12/29/19 Radial 3 days          Hemodialysis Catheter            HD Temporary Double Catheter 3 days          Drain            Urethral Catheter Temperature probe 3 days                Medications:    Scheduled Meds:  Current Facility-Administered Medications:  acetylcysteine 6 25 mg/kg Intravenous Once Tai Vyas MD Last Rate: 580 mg (01/01/20 0122)   ampicillin 2,000 mg Intravenous Q6H Eros Cerda MD Last Rate: 2,000 mg (01/01/20 1014)   artificial tear  Both Eyes BID Amber Veronica PA-C    ascorbic acid in sodium chloride 0 9% 100 mL IVPB 1,500 mg Intravenous Q6H Amber Veronica PA-C Last Rate: Stopped (01/01/20 0501)   dexmedetomidine 0 1-0 7 mcg/kg/hr Intravenous Titrated Kasey Garcia MD Last Rate: 0 7 mcg/kg/hr (01/01/20 1015)   epinephrine 1-10 mcg/min Intravenous Titrated Kasey Garcia MD Last Rate: 1 mcg/min (01/01/20 7230)   fentaNYL 100 mcg/hr Intravenous Continuous Josesito Garcia MD Last Rate: 100 mcg/hr (01/01/20 0521)   fentanyl citrate (PF) 50 mcg Intravenous Q1H PRN Kasey Garcia MD    hydrocortisone sodium succinate 50 mg Intravenous Q6H Aamir Veronica PA-C    ketamine 0 2 mg/kg/hr Intravenous Continuous Josesito Garcia MD Last Rate: 0 2 mg/kg/hr (01/01/20 0019)   Ketamine HCl 50 mg Intravenous Q2H PRN Marilyn Galicia MD    metroNIDAZOLE 500 mg Intravenous BID Marilyn Galicia MD Last Rate: 500 mg (01/01/20 7419)   norepinephrine 1-30 mcg/min Intravenous Titrated Tru Peña CHRISTIANA Last Rate: 30 mcg/min (01/01/20 0949)   NxStage K 4/Ca 3 20,000 mL Dialysis Continuous Tiffany Venegas DO Last Rate: 0 mL (01/01/20 0259)   pantoprazole 40 mg Intravenous Q24H [facility] Aamir Veronica PA-C    phenylephine  mcg/min Intravenous Titrated Kevin Glover PA-C Last Rate: 180 mcg/min (01/01/20 0901)   dextrose 10 % and sodium chloride 0 9 % 100 mL/hr Intravenous Continuous Oli Garcia MD Last Rate: Stopped (01/01/20 4603)   thiamine 200 mg Intravenous Q12H Kevin Glover PA-C Last Rate: Stopped (01/01/20 0100)   vasopressin (PITRESSIN) in 0 9 % sodium chloride 100 mL 0 04 Units/min Intravenous Continuous Josesito Garcia MD Last Rate: 0 04 Units/min (01/01/20 0258)       PRN Meds: fentanyl citrate (PF)    Ketamine HCl    Continuous Infusions:  dexmedetomidine 0 1-0 7 mcg/kg/hr Last Rate: 0 7 mcg/kg/hr (01/01/20 1015)   epinephrine 1-10 mcg/min Last Rate: 1 mcg/min (01/01/20 4246)   fentaNYL 100 mcg/hr Last Rate: 100 mcg/hr (01/01/20 0521)   ketamine 0 2 mg/kg/hr Last Rate: 0 2 mg/kg/hr (01/01/20 0019)   norepinephrine 1-30 mcg/min Last Rate: 30 mcg/min (01/01/20 0949)   NxStage K 4/Ca 3 20,000 mL Last Rate: 0 mL (01/01/20 0259)   phenylephine  mcg/min Last Rate: 180 mcg/min (01/01/20 0901)   dextrose 10 % and sodium chloride 0 9 % 100 mL/hr Last Rate: Stopped (01/01/20 0928)   vasopressin (PITRESSIN) in 0 9 % sodium chloride 100 mL 0 04 Units/min Last Rate: 0 04 Units/min (01/01/20 0258)           LAB RESULTS:      Results from last 7 days   Lab Units 01/01/20  0552 01/01/20  0005 12/31/19  1738 12/31/19  1213 12/31/19  0553 12/31/19  0436 12/30/19  2347 12/30/19  1801  12/30/19  0532  12/29/19  1802  12/29/19  1005 12/29/19  0608 12/29/19  0422 12/29/19  0222  12/28/19  2330 12/28/19  2145  12/28/19  2144   WBC Thousand/uL 75 44*  --   --   --   --  54 54*  --   --   --  45 98*  --   --   --   --  46 36*  --   --   --   --   --   --  43 88*   HEMOGLOBIN g/dL 7 9*  --   --   --   --  8 1*  -- --   --  8 1*  --   --   --  8 6* 8 6*  --   --   --   --   --   --  10 2*   I STAT HEMOGLOBIN g/dl  --   --   --   --   --   --   --   --   --   --   --   --   --   --   --   --   --   --  9 2* 11 9*  --   --    HEMATOCRIT % 23 7*  --   --   --   --  23 8*  --   --   --  23 5*  --   --   --  25 2* 26 2*  --   --   --   --   --   --  32 6*   HEMATOCRIT, ISTAT %  --   --   --   --   --   --   --   --   --   --   --   --   --   --   --   --   --   --  27* 35*  --   --    PLATELETS Thousands/uL 335  --   --   --   --  466*  --   --   --  567*  --   --   --   --  667* 656*  --   --   --   --   --  811*   LYMPHO PCT % 1*  --   --   --   --  10*  --   --   --  2*  --   --   --   --  9*  --   --   --   --   --   --  6*   MONO PCT % 8  --   --   --   --  6  --   --   --  3*  --   --   --   --  9  --   --   --   --   --   --  7   EOS PCT % 0  --   --   --   --  1  --   --   --  0  --   --   --   --  0  --   --   --   --   --   --  0   POTASSIUM mmol/L 4 8 4 8 4 4 4 6 3 9 3 9 4 0 3 7   < > 4 1  4 1   < > 4 6   < >  --  6 2*  --  6 7*   < >  --   --   --   --    CHLORIDE mmol/L 107 107 110* 111* 109* 109* 110* 109*   < > 105  105   < > 102   < >  --  97*  --  94*   < >  --   --   --   --    CO2 mmol/L 17* 17* 19* 18* 18* 20* 18* 19*   < > 18*  17*   < > 15*   < >  --  12*  --  12*   < >  --   --   --   --    CO2, I-STAT mmol/L  --   --   --   --   --   --   --   --   --   --   --   --   --   --   --   --   --   --  12* 12*   < >  --    BUN mg/dL 16 18 19 20 19 19 20 20   < > 24  25   < > 32*   < >  --  49*  --  53*   < >  --   --   --   --    CREATININE mg/dL 2 44* 2 45* 2 39* 2 41* 2 53* 2 71* 2 59* 2 62*   < > 2 98*  2 98*   < > 3 24*   < >  --  4 07*  --  4 56*   < >  --   --   --   --    CALCIUM mg/dL 9 1 8 1* 8 4 8 5 8 4 8 6 8 5 8 6   < > 8 4  8 4   < > 7 7*   < >  --  6 9*  --  6 7*   < >  --   --   --   --    ALK PHOS U/L 1,061*  --   --   --   --  1,151*  --   --   --  1,205*  --  1,411*  --   --   --   -- 1,521*  --   --   --   --   --    ALT U/L 1,274*  --   --   --   --  1,614*  --   --   --  2,005*  --  2,246*  --   --   --   --  2,338*  --   --   --   --   --    AST U/L 3,477*  --   --   --   --  5,445*  --   --   --  8,768*  --  11,037*  --   --   --   --  13,166*  --   --   --   --   --    GLUCOSE, ISTAT mg/dl  --   --   --   --   --   --   --   --   --   --   --   --   --   --   --   --   --   --  95 40*  --   --    MAGNESIUM mg/dL 2 2 1 8 2 0 1 9 2 0  --  2 0 1 9   < > 2 0   < > 2 0   < >  --   --   --  2 5  --   --   --   --   --    PHOSPHORUS mg/dL 3 8 3 5 3 3 2 5* 1 3*  --  2 6* 3 5   < > 4 9*   < > 6 5*   < >  --   --   --  12 6*  --   --   --   --  17 3*    < > = values in this interval not displayed  CUTURES:  Lab Results   Component Value Date    BLOODCX Gamma Hemolytic Streptococcus 12/30/2019    BLOODCX Enterococcus faecalis (A) 12/28/2019    BLOODCX Enterococcus faecalis (A) 12/28/2019                 Portions of the record may have been created with voice recognition software  Occasional wrong word or "sound a like" substitutions may have occurred due to the inherent limitations of voice recognition software  Read the chart carefully and recognize, using context, where substitutions have occurred  If you have any questions, please contact the dictating provider

## 2020-01-01 NOTE — PROGRESS NOTES
Progress Note- Chris Dyson 34 y o  male MRN: [id number]  Unit/Bed#: MICU 06 Encounter: [id number]    Assessment and Plan:  Chris Dyson is a 34 y o  old male with PMH: Hepatoblastoma dx in 2013, [facility] [address] ,  who presented with acute liver failure       #Acute Liver Failure  #Hepatocellular Carcinoma  #Hepatoblastoma  MELD today: 40  Patient with acute liver failure in setting of septic shock and progressive hepatocellular carcinoma  Patient with negative hepatitis panel, negative acetaminophen  Though he has had recent autoimmune Abs checked recently, we will send off again as patients who have been on checkpoint inhibitors can develop complications similar to autoimmune hepatitis/colitis  Will check autoimmune abs to ensure no underlying complications        Plan:  -acute hepatitis panel - negative  -pending EBV, CMV, HSV   -reordered anti smooth muscle antibody, antimitochondrial antibody, TC  -RUQ US with dopplers - showed reversal of flow within patent splenic and superior mesenteric veins, difficult to evaluate portal and hepatic veins   -continue IV NAC               -initial loading dose of 150 mg/kg/hour over 1 hour                -followed by 12 5 mg/kg/hour for 4 hours               -then continuous infusion of 6 25 mg/kg for 67 hours     #Transaminitis   #Shock Liver  Patient with acutely elevated LFTs in setting of shock likely causing shock liver which is compounding his already tenuous liver function  Transaminases continue to improve which is suggestive of underlying ischemic event   Patient now on 4 vasopressive medications and it is possible he will continue to have ischemic insults    -qdaily LFTs  -support patient's blood pressure     #Hepatic Encephalopathy  Avoid PO lactulose in setting of possible SBO   -lactulose via enema (350ml tap water mixed with 150ml of Lactulose given MA q6-8hr)       #Enterococcal bacteremia  Likely in the setting of progression hepatocellular carcinoma   -Continue ampicillin  -vasopressor support per primary team  -appreciate ID recommendations  ______________________________________________________________________    Subjective: Intubated, sedated    Medication Administration - last 24 hours from 12/31/2019 1315 to 01/01/2020 1315       Date/Time Order Dose Route Action Action by     01/01/2020 1015 dexmedetomidine (PRECEDEX) 400 mcg in sodium chloride 0 9 % 100 mL infusion 0 7 mcg/kg/hr Intravenous New [facility] Sandro Villalobos RN     01/01/2020 1013 dexmedetomidine (PRECEDEX) 400 mcg in sodium chloride 0 9 % 100 mL infusion 0 mcg/kg/hr Intravenous Stopped Sandro Villalobos RN     01/01/2020 0258 dexmedetomidine (PRECEDEX) 400 mcg in sodium chloride 0 9 % 100 mL infusion 0 7 mcg/kg/hr Intravenous Rory Walker RN     01/01/2020 0257 dexmedetomidine (PRECEDEX) 400 mcg in sodium chloride 0 9 % 100 mL infusion 0 mcg/kg/hr Intravenous Stopped PERRY Mata     12/31/2019 2143 dexmedetomidine (PRECEDEX) 400 mcg in sodium chloride 0 9 % 100 mL infusion 0 7 mcg/kg/hr Intravenous Rory Walker RN     12/31/2019 2142 dexmedetomidine (PRECEDEX) 400 mcg in sodium chloride 0 9 % 100 mL infusion 0 mcg/kg/hr Intravenous Stopped Lawanda Romero RN     12/31/2019 1600 dexmedetomidine (PRECEDEX) 400 mcg in sodium chloride 0 9 % 100 mL infusion 0 6 mcg/kg/hr Intravenous Rate/Dose Verify Librado Moulton RN     12/31/2019 1331 dexmedetomidine (PRECEDEX) 400 mcg in sodium chloride 0 9 % 100 mL infusion 0 6 mcg/kg/hr Intravenous New Bag Librado Moulton RN     12/31/2019 1329 dexmedetomidine (PRECEDEX) 400 mcg in sodium chloride 0 9 % 100 mL infusion 0 mcg/kg/hr Intravenous Stopped Librado Moulton RN     01/01/2020 1240 vasopressin (PITRESSIN) 20 Units in sodium chloride 0 9 % 100 mL infusion 0 04 Units/min Intravenous [address] Sandro Villalobos RN     01/01/2020 1239 vasopressin (PITRESSIN) 20 Units in sodium chloride 0 9 % 100 mL infusion 0 Units/min Intravenous Stopped John Singletary, RN     01/01/2020 0258 vasopressin (PITRESSIN) 20 Units in sodium chloride 0 9 % 100 mL infusion 0 04 Units/min Intravenous [address] Pony, RN     01/01/2020 0257 vasopressin (PITRESSIN) 20 Units in sodium chloride 0 9 % 100 mL infusion 0 Units/min Intravenous Stopped Lawanda Romero, PERRY     12/31/2019 1749 vasopressin (PITRESSIN) 20 Units in sodium chloride 0 9 % 100 mL infusion 0 04 Units/min Intravenous New Bag Librado Kaye, RN     12/31/2019 1747 vasopressin (PITRESSIN) 20 Units in sodium chloride 0 9 % 100 mL infusion 0 Units/min Intravenous Stopped Librado Kaye RN     12/31/2019 1600 vasopressin (PITRESSIN) 20 Units in sodium chloride 0 9 % 100 mL infusion 0 04 Units/min Intravenous Rate/Dose Verify Librado Kaye, PERRY     01/01/2020 0019 ketamine 250 mg (STANDARD CONCENTRATION) IV in sodium chloride 0 9% 250 mL 0 2 mg/kg/hr Intravenous Rory Walker, RN     01/01/2020 0018 ketamine 250 mg (STANDARD CONCENTRATION) IV in sodium chloride 0 9% 250 mL 0 mg/kg/hr Intravenous Stopped Adolfo, PERRY     12/31/2019 2153 ketamine 250 mg (STANDARD CONCENTRATION) IV in sodium chloride 0 9% 250 mL 0 2 mg/kg/hr Intravenous Rate/Dose Change Lawanda Romero, PERRY     12/31/2019 1600 ketamine 250 mg (STANDARD CONCENTRATION) IV in sodium chloride 0 9% 250 mL 0 1 mg/kg/hr Intravenous Rate/Dose Verify Librado Kaye RN     01/01/2020 1062 sodium chloride (concentrated) 154 mEq in dextrose 10 % 1,000 mL infusion 0 mL/hr Intravenous Hold John Singletary, PERRY     01/01/2020 0053 sodium chloride (concentrated) 154 mEq in dextrose 10 % 1,000 mL infusion 100 mL/hr Intravenous Rory Walker RN     01/01/2020 0052 sodium chloride (concentrated) 154 mEq in dextrose 10 % 1,000 mL infusion 0 mL/hr Intravenous Stopped Lawanda Romero, RN     12/31/2019 1600 sodium chloride (concentrated) 154 mEq in dextrose 10 % 1,000 mL infusion 100 mL/hr Intravenous Rate/Dose Verify Librado Adams, RN     01/01/2020 1148 phenylephrine (RYNE-SYNEPHRINE) 50 mg (STANDARD CONCENTRATION) in sodium chloride 0 9% 250 mL 170 mcg/min Intravenous Rate/Dose Change Rosie Primrose, RN     01/01/2020 0901 phenylephrine (RYNE-SYNEPHRINE) 50 mg (STANDARD CONCENTRATION) in sodium chloride 0 9% 250 mL 180 mcg/min Intravenous [address] Rosie Primrose, RN     01/01/2020 0900 phenylephrine (RYNE-SYNEPHRINE) 50 mg (STANDARD CONCENTRATION) in sodium chloride 0 9% 250 mL 0 mcg/min Intravenous Stopped Rosie Primrose, RN     01/01/2020 0331 phenylephrine (RYNE-SYNEPHRINE) 50 mg (STANDARD CONCENTRATION) in sodium chloride 0 9% 250 mL 180 mcg/min [facility]venous 9 [facility] aida, RN     01/01/2020 0330 phenylephrine (RYNE-SYNEPHRINE) 50 mg (STANDARD CONCENTRATION) in sodium chloride 0 9% 250 mL 0 mcg/min Intravenous Stopped Adolfo, PERRY     12/31/2019 2305 phenylephrine (RYNE-SYNEPHRINE) 50 mg (STANDARD CONCENTRATION) in sodium chloride 0 9% 250 mL 180 mcg/min Intravenous Rory Walker, RN     12/31/2019 2304 phenylephrine (RYNE-SYNEPHRINE) 50 mg (STANDARD CONCENTRATION) in sodium chloride 0 9% 250 mL 0 mcg/min Intravenous Stopped Lawanda Romero, PERRY     12/31/2019 1834 phenylephrine (RYNE-SYNEPHRINE) 50 mg (STANDARD CONCENTRATION) in sodium chloride 0 9% 250 mL 180 mcg/min Intravenous New Bag Librado Adams, RN     12/31/2019 1833 phenylephrine (RYNE-SYNEPHRINE) 50 mg (STANDARD CONCENTRATION) in sodium chloride 0 9% 250 mL 0 mcg/min Intravenous Stopped Librado Adams, RN     12/31/2019 1600 phenylephrine (RYNE-SYNEPHRINE) 50 mg (STANDARD CONCENTRATION) in sodium chloride 0 9% 250 mL 180 mcg/min Intravenous Rate/Dose Verify Librado Adams, RN     12/31/2019 1328 phenylephrine (RYNE-SYNEPHRINE) 50 mg (STANDARD CONCENTRATION) in sodium chloride 0 9% 250 mL 180 mcg/min Intravenous New Bag Librado Adams, RN     12/31/2019 1326 phenylephrine (RYNE-SYNEPHRINE) 50 mg (STANDARD CONCENTRATION) in sodium chloride 0 9% 250 mL 0 mcg/min Intravenous Stopped Ma Verdel Severance, RN     01/01/2020 1210 ascorbic acid 1,500 mg in sodium chloride 0 9 % 100 mL IVPB 1,500 mg Intravenous [address] Kathy Hale, RN     01/01/2020 0501 ascorbic acid 1,500 mg in sodium chloride 0 9 % 100 mL IVPB 0 mg Intravenous Stopped Adolfo, RN     01/01/2020 0408 ascorbic acid 1,500 mg in sodium chloride 0 9 % 100 mL IVPB 1,500 mg Intravenous Rory Walker, RN     01/01/2020 0021 ascorbic acid 1,500 mg in sodium chloride 0 9 % 100 mL IVPB 1,500 mg Intravenous Not Given Adolfo, RN     12/31/2019 1719 ascorbic acid 1,500 mg in sodium chloride 0 9 % 100 mL IVPB 0 mg Intravenous Stopped Librado Mccallum Severance, RN     12/31/2019 1641 ascorbic acid 1,500 mg in sodium chloride 0 9 % 100 mL IVPB 1,500 mg Intravenous New Bag Librado Mccallum Severance, RN     01/01/2020 1200 thiamine (VITAMIN B1) 200 mg in sodium chloride 0 9 % 50 mL IVPB 0 mg Intravenous Stopped Kathy Hale, RN     01/01/2020 1108 thiamine (VITAMIN B1) 200 mg in sodium chloride 0 9 % 50 mL IVPB 200 mg Intravenous New Bag Kathy Jennings, RN     01/01/2020 0100 thiamine (VITAMIN B1) 200 mg in sodium chloride 0 9 % 50 mL IVPB 0 mg Intravenous Stopped Lawanda Romero, RN     12/31/2019 2230 thiamine (VITAMIN B1) 200 mg in sodium chloride 0 9 % 50 mL IVPB 200 mg Intravenous 9 [facility], RN     01/01/2020 8438 hydrocortisone sodium succinate (PF) (Solu-CORTEF) injection 50 mg 50 mg Intravenous Given Kathy Dick, RN     01/01/2020 6759 hydrocortisone sodium succinate (PF) (Solu-CORTEF) injection 50 mg 50 mg Intravenous Given Adolfo, RN     12/31/2019 2034 hydrocortisone sodium succinate (PF) (Solu-CORTEF) injection 50 mg 50 mg Intravenous Given Adolfo, RN     12/31/2019 1514 hydrocortisone sodium succinate (PF) (Solu-CORTEF) injection 50 mg 50 mg Intravenous Given Librado Mora, RN     01/01/2020 5410 pantoprazole (PROTONIX) injection 40 mg 40 mg Intravenous Given John Singletary, RN     01/01/2020 0841 artificial tear (LUBRIFRESH P M ) ophthalmic ointment   Both Eyes Given John Singletary, RN     12/31/2019 1743 artificial tear (LUBRIFRESH P M ) ophthalmic ointment   Both Eyes Given Librado Kaye, RN     01/01/2020 1108 NxStage K 4/Ca 3 dialysis solution (RFP-401) 20,000 mL 0 mL Dialysis Stopped John Singletary, RN     01/01/2020 0300 NxStage K 4/Ca 3 dialysis solution (RFP-401) 20,000 mL 20,000 mL Dialysis [address] Reanna, RN     01/01/2020 0259 NxStage K 4/Ca 3 dialysis solution (RFP-401) 20,000 mL 0 mL Dialysis Stopped Reanna, RN     12/31/2019 1929 NxStage K 4/Ca 3 dialysis solution (RFP-401) 20,000 mL 20,000 mL Dialysis [address] Reanna, RN     12/31/2019 1928 NxStage K 4/Ca 3 dialysis solution (RFP-401) 20,000 mL 0 mL Dialysis Stopped Reanna, RN     01/01/2020 7020 norepinephrine (LEVOPHED) 8 mg (DOUBLE CONCENTRATION) IV in sodium chloride 0 9% 250 mL 30 mcg/min Intravenous [address] John Singletary RN     01/01/2020 8044 norepinephrine (LEVOPHED) 8 mg (DOUBLE CONCENTRATION) IV in sodium chloride 0 9% 250 mL 0 mcg/min Intravenous Stopped John Singletary RN     01/01/2020 0445 norepinephrine (LEVOPHED) 8 mg (DOUBLE CONCENTRATION) IV in sodium chloride 0 9% 250 mL 30 mcg/min Intravenous 9 [facility]     01/01/2020 0444 norepinephrine (LEVOPHED) 8 mg (DOUBLE CONCENTRATION) IV in sodium chloride 0 9% 250 mL 0 mcg/min Intravenous Stopped Vanderbilt, RN     01/01/2020 0019 norepinephrine (LEVOPHED) 8 mg (DOUBLE CONCENTRATION) IV in sodium chloride 0 9% 250 mL 30 mcg/min Intravenous 9 [facility], RN     01/01/2020 0018 norepinephrine (LEVOPHED) 8 mg (DOUBLE CONCENTRATION) IV in sodium chloride 0 9% 250 mL 0 mcg/min Intravenous Stopped Vanderbilt, RN     12/31/2019 1600 norepinephrine (LEVOPHED) 8 mg (DOUBLE CONCENTRATION) IV in sodium chloride 0 9% 250 mL 30 mcg/min Intravenous Rate/Dose Verify Librado Pillai RN     12/31/2019 1513 norepinephrine (LEVOPHED) 8 mg (DOUBLE CONCENTRATION) IV in sodium chloride 0 9% 250 mL 30 mcg/min Intravenous New Bag Librado Pillai RN     12/31/2019 1512 norepinephrine (LEVOPHED) 8 mg (DOUBLE CONCENTRATION) IV in sodium chloride 0 9% 250 mL 0 mcg/min Intravenous Stopped Librado Pillai RN     01/01/2020 6958 ampicillin (OMNIPEN) 2,000 mg in sodium chloride 0 9 % 100 mL IVPB 0 mg Intravenous Stopped Milli Barajas RN     12/31/2019 1533 ampicillin (OMNIPEN) 2,000 mg in sodium chloride 0 9 % 100 mL IVPB 2,000 mg Intravenous New Bag Librado Pillai RN     01/01/2020 1000 metroNIDAZOLE (FLAGYL) IVPB (premix) 500 mg 0 mg Intravenous Stopped Milli Barajas RN     01/01/2020 3967 metroNIDAZOLE (FLAGYL) IVPB (premix) 500 mg 500 mg Intravenous [address] Milli Barajas RN     12/31/2019 1833 metroNIDAZOLE (FLAGYL) IVPB (premix) 500 mg 0 mg Intravenous Stopped Milli Barajas RN     12/31/2019 1743 metroNIDAZOLE (FLAGYL) IVPB (premix) 500 mg 500 mg Intravenous New Bag Librado Pillai RN     01/01/2020 1772 Ketamine HCl 50 mg 50 mg Intravenous Given Etta Yves, RN     12/31/2019 2203 Ketamine HCl 50 mg 50 mg Intravenous Not Given Etta Cea, RN     12/31/2019 2005 Ketamine HCl 50 mg 50 mg Intravenous Not Given Etta Cea, RN     12/31/2019 1527 sodium phosphate 21 mmol in dextrose 5 % 250 mL Infusion 0 mmol Intravenous Stopped Librado Pillai RN     12/31/2019 1405 calcium gluconate 1 g in sodium chloride 0 9% 50 mL (premix) 0 g Intravenous Stopped Librado Pillai RN     12/31/2019 1423 magnesium sulfate IVPB (premix) SOLN 1 g 0 g Intravenous Stopped Librado Pillai RN     12/31/2019 1323 magnesium sulfate IVPB (premix) SOLN 1 g 1 g Intravenous New Bag Librado Pillai RN     12/31/2019 1546 sodium phosphate 6 mmol in sodium chloride 0 9 % 100 mL Infusion 0 mmol Intravenous Stopped Librado Mccallum Severance, RN     12/31/2019 1346 sodium phosphate 6 mmol in sodium chloride 0 9 % 100 mL Infusion 6 mmol Intravenous New Bag Librado Mccallum Severance, RN     01/01/2020 1100 ampicillin (OMNIPEN) 2,000 mg in sodium chloride 0 9 % 100 mL IVPB 0 mg Intravenous Stopped Kathy Dick, RN     01/01/2020 1014 ampicillin (OMNIPEN) 2,000 mg in sodium chloride 0 9 % 100 mL IVPB 2,000 mg Intravenous New Bag Kathy Pool, RN     01/01/2020 0400 ampicillin (OMNIPEN) 2,000 mg in sodium chloride 0 9 % 100 mL IVPB 0 mg Intravenous Stopped Adolfo, PERRY     01/01/2020 0318 ampicillin (OMNIPEN) 2,000 mg in sodium chloride 0 9 % 100 mL IVPB 2,000 mg Intravenous Rory Walker, RN     12/31/2019 2201 ampicillin (OMNIPEN) 2,000 mg in sodium chloride 0 9 % 100 mL IVPB 0 mg Intravenous Stopped Lawanda Romero, RN     12/31/2019 2139 ampicillin (OMNIPEN) 2,000 mg in sodium chloride 0 9 % 100 mL IVPB 2,000 mg Intravenous Rory Walker, RN     12/31/2019 2101 acetylcysteine (ACETADOTE) 14,140 mg in dextrose 5 % 200 mL IVPB 0 mg Intravenous Stopped Adolfo, PERRY     12/31/2019 1838 acetylcysteine (ACETADOTE) 14,140 mg in dextrose 5 % 200 mL IVPB 14,140 mg Intravenous New Bag Librado Mccallum Severance, RN     01/01/2020 0122 acetylcysteine (ACETADOTE) 580 mg in dextrose 5 % 1,000 mL IVPB 580 mg Intravenous Rory Walker, RN     01/01/2020 0200 acetylcysteine (ACETADOTE) 1,180 mg in dextrose 5 % 500 mL IVPB 0 mg Intravenous Stopped Adolfo, PERRY     12/31/2019 2115 acetylcysteine (ACETADOTE) 1,180 mg in dextrose 5 % 500 mL IVPB 1,180 mg Intravenous Rory Walker RN     12/31/2019 2000 calcium gluconate 2 g in sodium chloride 0 9% 100 mL (premix) 0 g Intravenous Stopped PERRY Mata     12/31/2019 1758 calcium gluconate 2 g in sodium chloride 0 9% 100 mL (premix) 2 g Intravenous New Bag Ma Verdel Severance, RN     12/31/2019 2032 EPINEPHrine PF (ADRENALIN) 1 mg/mL injection **ADS Override Pull**    Not Given [place] RN     12/31/2019 2004 Ketamine HCl 20 mg 20 mg Intravenous Given [place] RN     12/31/2019 2100 albumin human (FLEXBUMIN) 5 % injection 12 5 g 0 g Intravenous Stopped Rose Plaza RN     12/31/2019 2005 albumin human (FLEXBUMIN) 5 % injection 12 5 g 12 5 g Intravenous Rory Walker RN     12/31/2019 2100 EPINEPHrine (ADRENALIN) 0 1 mg/mL injection **ADS Override Pull**    Given Rose Plaza RN     12/31/2019 2126 fentanyl citrate (PF) 100 MCG/2ML 50 mcg 50 mcg Intravenous Given Rose [place] RN     12/31/2019 2154 fentanyl citrate (PF) 100 MCG/2ML 50 mcg 50 mcg Intravenous Given Rose [place] RN     01/01/2020 0521 fentaNYL 1000 mcg in sodium chloride 0 9% 100mL infusion 100 mcg/hr Intravenous 9 [facility], RN     01/01/2020 0041 fentaNYL 1000 mcg in sodium chloride 0 9% 100mL infusion 0 mcg/hr Intravenous Stopped Lawanda Romero RN     01/01/2020 0310 fentaNYL 1000 mcg in sodium chloride 0 9% 100mL infusion 100 mcg/hr Intravenous Rate/Dose Change Rose Plaza RN     12/31/2019 2153 fentaNYL 1000 mcg in sodium chloride 0 9% 100mL infusion 75 mcg/hr Intravenous Rate/Dose Change Lawanda Romero RN     12/31/2019 2132 fentaNYL 1000 mcg in sodium chloride 0 9% 100mL infusion 50 mcg/hr Intravenous 9 [facility], RN     12/31/2019 2202 Ketamine HCl 20 mg 20 mg Intravenous Given Rose Plaza RN     12/31/2019 2223 albumin human (FLEXBUMIN) 5 % injection 12 5 g 0 g Intravenous Stopped Rose Plaza RN     12/31/2019 2202 albumin human (FLEXBUMIN) 5 % injection 12 5 g 12 5 g Intravenous Rory Walker RN     01/01/2020 1030 EPINEPHrine 3000 mcg (STANDARD CONCENTRATION) IV in sodium chloride 0 9% 250 mL 0 mcg/min Intravenous Hold Kathy Jennings RN     01/01/2020 8662 EPINEPHrine 3000 mcg (STANDARD CONCENTRATION) IV in sodium chloride 0 9% 250 mL 1 mcg/min Intravenous Rate/Dose Change Red Brace, RN     01/01/2020 0558 EPINEPHrine 3000 mcg (STANDARD CONCENTRATION) IV in sodium chloride 0 9% 250 mL 2 mcg/min Intravenous Rate/Dose Change Lawanda Romero, RN     01/01/2020 0150 EPINEPHrine 3000 mcg (STANDARD CONCENTRATION) IV in sodium chloride 0 9% 250 mL 3 mcg/min Intravenous Rate/Dose Change Lawanda Romero, RN     01/01/2020 0129 EPINEPHrine 3000 mcg (STANDARD CONCENTRATION) IV in sodium chloride 0 9% 250 mL 2 mcg/min Intravenous Rate/Dose Change Lawanda Romero, RN     01/01/2020 0100 EPINEPHrine 3000 mcg (STANDARD CONCENTRATION) IV in sodium chloride 0 9% 250 mL 3 mcg/min Intravenous Rate/Dose Change Lawanda Romero, RN     12/31/2019 2230 EPINEPHrine 3000 mcg (STANDARD CONCENTRATION) IV in sodium chloride 0 9% 250 mL 2 mcg/min Intravenous Rory Walker, RN     01/01/2020 1154 fentanyl citrate (PF) 100 MCG/2ML 50 mcg 50 mcg Intravenous Given Noemi Primrose, RN     01/01/2020 0843 fentanyl citrate (PF) 100 MCG/2ML 50 mcg 50 mcg Intravenous Given Noemi Primrose, RN     01/01/2020 0644 fentanyl citrate (PF) 100 MCG/2ML 50 mcg 50 mcg Intravenous Given Red Brace, RN     01/01/2020 0500 fentanyl citrate (PF) 100 MCG/2ML 50 mcg 50 mcg Intravenous Given Red Brace, RN     01/01/2020 0114 fentanyl citrate (PF) 100 MCG/2ML 50 mcg 50 mcg Intravenous Given Red Brace, RN     12/31/2019 2304 fentanyl citrate (PF) 100 MCG/2ML 50 mcg 50 mcg Intravenous Given Red Brace, RN     01/01/2020 0500 calcium gluconate 3 g in sodium chloride 0 9 % 100 mL IVPB 0 g Intravenous Stopped Red Brace, RN     01/01/2020 0214 calcium gluconate 3 g in sodium chloride 0 9 % 100 mL IVPB 3 g Intravenous Rory Walker, RN     01/01/2020 0221 magnesium sulfate IVPB (premix) SOLN 1 g 0 g Intravenous Stopped Red Brace, RN     01/01/2020 0120 magnesium sulfate IVPB (premix) SOLN 1 g 1 g Intravenous Rory Walker RN     01/01/2020 0800 calcium gluconate 2 g in sodium chloride 0 9% 100 mL (premix) 0 g Intravenous Stopped Sandro Villalobos RN     01/01/2020 8130 calcium gluconate 2 g in sodium chloride 0 9% 100 mL (premix) 2 g Intravenous 9 Kathy Cam[facility] [place], RN     01/01/2020 1109 NxStage K 4/Ca 3 dialysis solution (RFP-401) 20,000 mL 20,000 mL Dialysis New Bag Sandro Villalobos RN          Objective:     Vitals: Blood pressure (!) 72/43, pulse 98, temperature (!) 96 8 °F (36 °C), resp  rate (!) 10, height 6' 6" (1 981 m), weight 95 8 kg (211 lb 3 2 oz), SpO2 94 %  ,Body mass index is 24 41 kg/m²  Intake/Output Summary (Last 24 hours) at 1/1/2020 1315  Last data filed at 1/1/2020 1200  Gross per 24 hour   Intake 8996 9 ml   Output 7015 ml   Net 1981 9 ml       Physical Exam:   General Appearance:   Alert, cooperative, no distress   HEENT:   Normocephalic, atraumatic, anicteric  Neck:  Supple, symmetrical, trachea midline   Lungs:   Clear to auscultation bilaterally; no rales, rhonchi or wheezing; respirations unlabored    Heart[de-identified]   Regular rate and rhythm; no murmur, rub, or gallop     Abdomen:   Soft, non-tender, non-distended; normal bowel sounds; no masses, no organomegaly    Genitalia:   Deferred    Rectal:   Deferred    Extremities:  No cyanosis, clubbing or edema    Pulses:  2+ and symmetric all extremities    Skin:  No jaundice, rashes, or lesions    Lymph nodes:  No palpable cervical lymphadenopathy        Invasive Devices     Peripheral Intravenous Line            Peripheral IV 12/28/19 Right Antecubital 3 days    Peripheral IV 12/28/19 Right Forearm 3 days    Peripheral IV 12/31/19 Right;Upper Arm less than 1 day    Peripheral IV 12/31/19 Right;Upper Arm less than 1 day          Arterial Line            Arterial Line 12/29/19 Radial 3 days          Hemodialysis Catheter            HD Temporary Double Catheter 3 days          Drain Urethral Catheter Temperature probe 3 days                Lab Results:  No results displayed because visit has over 200 results  Imaging Studies: I have personally reviewed pertinent imaging studies        ---------------------------------------------------  Note Electronically Signed By:    MD Sanna Bryant 73 Gastroenterology Fellow PGY-4  PI #: [id number]

## 2020-01-01 NOTE — PROGRESS NOTES
Progress Note - Infectious Disease   Yumiko Davis 34 y o  male MRN: [id number]  Unit/Bed#: MICU 06 Encounter: [id number]      Impression/Recommendations:  1  Septic shock, POA  Hypothermia, tachycardia, profound leukocytosis, refractory hypotension  Due to # 2  No other appreciable source of infection  On appropriate antibiotics  Suspect ongoing hemodynamic derangements, leukocytosis due to profound multiorgan system failure  Consider role of inflammatory reaction to recent chemotherapy  Remains critically ill on multiple pressors, CVVHD  Rec:  ? Continue ampicillin for now  ? Follow temperatures closely  ? Recheck CBC in a m   ? Supportive care as per the primary service     2  Enterococcal bacteremia  Suspect GI versus biliary source in setting of progressive hepatocellular carcinoma  No piero abscess or biliary obstruction noted on CT  Consider endocarditis  No indwelling lines  Repeat blood cultures positive but with late growth  and drawn after <36 hours of antibiotics  Rec:  ? Continue ampicillin for now  ? Follow-up repeat blood cultures from this a m   ? Follow up TTE     3  AZALIA  With hyperkalemia and acidosis  Likely multifactorial due to infection, profound dehydration  Consider also hepatorenal   Consider role of inflammatory reaction to recent chemotherapy  On CVVHD  Rec:  ? Dose adjust antibiotics for CVVHD  ? Close renal follow-up ongoing     4  Shock liver  Likely multifactorial due to infection, profound hypotension and dehydration  In the setting of # 8  Transaminases improving slightly since admission but remain quite elevated  Rec:  ? Supportive care as per the primary service  ? Recheck CMP in a m      5  Acute encephalopathy  Likely multifactorial due to all of above  CT head negative  Remains encephalopathic  Rec:  ? Supportive care as per the primary service  ? Follow mental status closely     6  SBO  No NG tube placed due to coagulopathy    Conservative management ongoing per surgery      7   History of C  Diff  High risk for recurrence  On prophylactic IV Flagyl      8   Progressive hepatocellular carcinoma  Recent CT with progressive disease  On palliative Ramucircumab      The above impression and plan was discussed in detail with patient's father at the bedside as well as the [facility] service      Antibiotics:  Ampicillin #3  Antibiotics #5  IV Flagyl #4    Subjective:  Patient seen on AM rounds  Unable to provide any history due to encephalopathy  24 Hour Events: Worsening hypotension overnight requiring additional pressors  Increased sedation due to agitation and moaning  No noted fevers, chills, sweats, nausea, vomiting, or diarrhea  WBC worsening  Objective:  Vitals:  Temp:  [96 8 °F (36 °C)-98 4 °F (36 9 °C)] 96 8 °F (36 °C)  HR:  [] 98  Resp:  [9-29] 10  BP: (72)/(43) 72/43  SpO2:  [94 %-100 %] 94 %  Temp (24hrs), Av 5 °F (36 4 °C), Min:96 8 °F (36 °C), Max:98 4 °F (36 9 °C)  Current: Temperature: (!) 96 8 °F (36 °C)    Physical Exam:   General:  No acute distress, jaundiced  Eyes:  Normal lids, conjunctivae all edema, scleral icterus  ENT:  Normal external ears and nose  Neck:  Neck symmetric with midline trachea  Pulmonary:  Coarse upper airway sounds without accessory muscle use  Cardiovascular:  Tachycardic with a regular rhythm; generalized edema  Gastrointestinal:  No tenderness or distention  Musculoskeletal:  No digital clubbing or cyanosis  Skin:  No visible rashes; No palpable nodules  Neurologic:  Sensation grossly intact to light touch  Psychiatric:  Encephalopathic    Lab Results:  I have personally reviewed pertinent labs    Results from last 7 days   Lab Units 20  1210 20  0552 20  0005  19  0436  19  0532  19  2330 19  2145   POTASSIUM mmol/L 4 9 4 8 4 8   < > 3 9   < > 4 1  4 1   < >  --   --    CHLORIDE mmol/L 107 107 107   < > 109*   < > 105  105   < >  --   --    CO2 mmol/L 20* 17* 17*   < > 20*   < > 18*  17*   < >  --   --    CO2, I-STAT mmol/L  --   --   --   --   --   --   --   --  12* 12*   BUN mg/dL 16 16 18   < > 19   < > 24  25   < >  --   --    CREATININE mg/dL 2 25* 2 44* 2 45*   < > 2 71*   < > 2 98*  2 98*   < >  --   --    EGFR ml/min/1 73sq m 38 34 34   < > 30   < > 27  27   < > 16 14   GLUCOSE, ISTAT mg/dl  --   --   --   --   --   --   --   --  95 40*   CALCIUM mg/dL 8 9 9 1 8 1*   < > 8 6   < > 8 4  8 4   < >  --   --    AST U/L  --  3,477*  --   --  5,445*  --  2,100*   < >  --   --    ALT U/L  --  1,274*  --   --  1,614*  --  2,005*   < >  --   --    ALK PHOS U/L  --  1,061*  --   --  1,151*  --  1,205*   < >  --   --     < > = values in this interval not displayed  Results from last 7 days   Lab Units 01/01/20  0552 12/31/19  0436 12/30/19  0532   WBC Thousand/uL 75 44* 54 54* 45 98*   HEMOGLOBIN g/dL 7 9* 8 1* 8 1*   PLATELETS Thousands/uL 335 466* 567*     Results from last 7 days   Lab Units 01/01/20  0442 12/30/19  0544 12/30/19  0534 12/28/19  2246 12/28/19  2230   BLOOD CULTURE  Received in Microbiology Lab  Culture in Progress  Enterococcus faecalis*  --  Enterococcus faecalis* Enterococcus faecalis*   GRAM STAIN RESULT   --  Gram positive cocci in chains* Gram positive cocci in pairs and chains* Gram positive cocci in pairs and chains* Gram positive cocci in pairs and chains*       Imaging Studies:   I have personally reviewed pertinent imaging study reports and images in PACS  RUQ U/S hepatic and portal veins not visualized  Reversal of flow in splenic and superior mesenteric veins  EKG, Pathology, and Other Studies:   I have personally reviewed pertinent reports

## 2020-01-01 NOTE — QUICK NOTE
Patient's blood pressure continued to decrease, with systolic blood pressures in the 70s  His Levophed was subsequently increased with temporary improvement, however, he began to deteriorate again  Epinephrine was added to maintain a MAP greater than 65  When patient was re-evaluated, he appeared uncomfortable  His ketamine infusion was increased and patient was started on a fentanyl infusion as well  A discussion was had with the patient's mother including myself and Dr Santos Carrera regarding the patient's current condition  It was explained that the patient is now on 4 pressors to maintain his blood pressure and it was agreed upon that our primary goal at this point should be to maintain the patient's comfort while maintaining the patient's blood pressure as above

## 2020-01-01 NOTE — TREATMENT PLAN
Changed CVVHD to 2K bath due to K 4 9 on last check  Monitor serial labs  Continue even UF, Qd 3000ml/hr

## 2020-01-01 NOTE — PROGRESS NOTES
Patient became hypotensive again with SBP's in the 70's  Dr Sherri Hernandez and Dr Malcom Chan at the bedside to evaluate patient  Patient appeared uncomfortable, moaning and increased work of breathing with nasal flaring and accessory muscle use noted  Orders received for ketamine, fentanyl and an epinephrine drip, see MAR for administration detail

## 2020-01-01 NOTE — PROCEDURES
Central Line Insertion  Date/Time: 1/1/2020 3:01 PM  Performed by: SHANA Au  Authorized by: SHANA Au     Patient location:  Bedside  Other Assisting Provider: Yes (comment) (Dr Mechelle Steward)    Consent:     Consent obtained:  Written (obtained by Dr Mechelle Steward)    Consent given by:  Parent    Procedural risks discussed: As explained by Dr Mechelle Steward to pt's mother  Universal protocol:     Procedure explained and questions answered to patient or proxy's satisfaction: yes      Immediately prior to procedure, a time out was called: yes      Patient identity confirmed:  Hospital-assigned identification number, anonymous protocol, patient vented/unresponsive and arm band  Pre-procedure details:     Hand hygiene: Hand hygiene performed prior to insertion      Sterile barrier technique: All elements of maximal sterile technique followed      Skin preparation:  ChloraPrep    Skin preparation agent: Skin preparation agent completely dried prior to procedure    Indications:     Central line indications: medications requiring central line and hemodynamic monitoring    Sedation:     Sedation type: Fentanyl, ketamine and precedex infusions  Anesthesia (see MAR for exact dosages): Anesthesia method:  Local infiltration    Local anesthetic:  Lidocaine 1% w/o epi (3 ml)  Procedure details:     Location:  Left femoral    Vessel type: vein      Laterality:  Left    Approach: percutaneous technique used      Patient position:  Flat    Catheter type:  Triple lumen 20cm    Catheter size:  7 Fr    Landmarks identified: yes      Ultrasound guidance: yes      Sterile ultrasound techniques: Sterile gel and sterile probe covers were used      Manometry confirmation: no      Number of attempts:  2    Successful placement: yes    Post-procedure details:     Post-procedure:  Dressing applied and line sutured    Assessment:  Blood return through all ports    Patient tolerance of procedure:   Tolerated well, no immediate complications    Observer name:  Dr Dre Mehta

## 2020-01-01 NOTE — PLAN OF CARE
Problem: Prexisting or High Potential for Compromised Skin Integrity  Goal: Skin integrity is maintained or improved  Description  INTERVENTIONS:  - Identify patients at risk for skin breakdown  - Assess and monitor skin integrity  - Assess and monitor nutrition and hydration status  - Monitor labs   - Assess for incontinence   - Turn and reposition patient  - Assist with mobility/ambulation  - Relieve pressure over bony prominences  - Avoid friction and shearing  - Provide appropriate hygiene as needed including keeping skin clean and dry  - Evaluate need for skin moisturizer/barrier cream  - Collaborate with interdisciplinary team   - Patient/family teaching  - Consider wound care consult   Outcome: Progressing     Problem: Potential for Falls  Goal: Patient will remain free of falls  Description  INTERVENTIONS:  - Assess patient frequently for physical needs  -  Identify cognitive and physical deficits and behaviors that affect risk of falls    -  [place] fall precautions as indicated by assessment   - Educate patient/family on patient safety including physical limitations  - Instruct patient to call for assistance with activity based on assessment  - Modify environment to reduce risk of injury  - Consider OT/PT consult to assist with strengthening/mobility  Outcome: Progressing     Problem: METABOLIC, FLUID AND ELECTROLYTES - ADULT  Goal: Glucose maintained within target range  Description  INTERVENTIONS:  - Monitor Blood Glucose as ordered  - Assess for signs and symptoms of hyperglycemia and hypoglycemia  - Administer ordered medications to maintain glucose within target range  - Assess nutritional intake and initiate nutrition service referral as needed  Outcome: Progressing     Problem: NEUROSENSORY - ADULT  Goal: Achieves stable or improved neurological status  Description  INTERVENTIONS  - Monitor and report changes in neurological status  - Monitor vital signs such as temperature, blood pressure, glucose, and any other labs ordered   - Initiate measures to prevent increased intracranial pressure  - Monitor for seizure activity and implement precautions if appropriate      Outcome: Not Progressing  Goal: Achieves maximal functionality and self care  Description  INTERVENTIONS  - Monitor swallowing and airway patency with patient fatigue and changes in neurological status  - Encourage and assist patient to increase activity and self care     - Encourage visually impaired, hearing impaired and aphasic patients to use assistive/communication devices  Outcome: Not Progressing     Problem: CARDIOVASCULAR - ADULT  Goal: Maintains optimal cardiac output and hemodynamic stability  Description  INTERVENTIONS:  - Monitor I/O, vital signs and rhythm  - Monitor for S/S and trends of decreased cardiac output  - Administer and titrate ordered vasoactive medications to optimize hemodynamic stability  - Assess quality of pulses, skin color and temperature  - Assess for signs of decreased coronary artery perfusion  - Instruct patient to report change in severity of symptoms  Outcome: Not Progressing  Goal: Absence of cardiac dysrhythmias or at baseline rhythm  Description  INTERVENTIONS:  - Continuous cardiac monitoring, vital signs, obtain 12 lead EKG if ordered  - Administer antiarrhythmic and heart rate control medications as ordered  - Monitor electrolytes and administer replacement therapy as ordered  Outcome: Not Progressing     Problem: GASTROINTESTINAL - ADULT  Goal: Minimal or absence of nausea and/or vomiting  Description  INTERVENTIONS:  - Administer IV fluids if ordered to ensure adequate hydration  - Maintain NPO status until nausea and vomiting are resolved  - Nasogastric tube if ordered  - Administer ordered antiemetic medications as needed  - Provide nonpharmacologic comfort measures as appropriate  - Advance diet as tolerated, if ordered  - Consider nutrition services referral to assist patient with adequate nutrition and appropriate food choices  Outcome: Not Progressing  Goal: Maintains or returns to baseline bowel function  Description  INTERVENTIONS:  - Assess bowel function  - Encourage oral fluids to ensure adequate hydration  - Administer IV fluids if ordered to ensure adequate hydration  - Administer ordered medications as needed  - Encourage mobilization and activity  - Consider nutritional services referral to assist patient with adequate nutrition and appropriate food choices  Outcome: Not Progressing  Goal: Maintains adequate nutritional intake  Description  INTERVENTIONS:  - Monitor percentage of each meal consumed  - Identify factors contributing to decreased intake, treat as appropriate  - Assist with meals as needed  - Monitor I&O, weight, and lab values if indicated  - Obtain nutrition services referral as needed  Outcome: Not Progressing     Problem: GENITOURINARY - ADULT  Goal: Maintains or returns to baseline urinary function  Description  INTERVENTIONS:  - Assess urinary function  - Encourage oral fluids to ensure adequate hydration if ordered  - Administer IV fluids as ordered to ensure adequate hydration  - Administer ordered medications as needed  - Offer frequent toileting  - Follow urinary retention protocol if ordered  Outcome: Not Progressing  Goal: Urinary catheter remains patent  Description  INTERVENTIONS:  - Assess patency of urinary catheter  - If patient has a chronic hernandez, consider changing catheter if non-functioning  - Follow guidelines for intermittent irrigation of non-functioning urinary catheter  Outcome: Not Progressing     Problem: METABOLIC, FLUID AND ELECTROLYTES - ADULT  Goal: Electrolytes maintained within normal limits  Description  INTERVENTIONS:  - Monitor labs and assess patient for signs and symptoms of electrolyte imbalances  - Administer electrolyte replacement as ordered  - Monitor response to electrolyte replacements, including repeat lab results as appropriate  - Instruct patient on fluid and nutrition as appropriate  Outcome: Not Progressing  Goal: Fluid balance maintained  Description  INTERVENTIONS:  - Monitor labs   - Monitor I/O and WT  - Instruct patient on fluid and nutrition as appropriate  - Assess for signs & symptoms of volume excess or deficit  Outcome: Not Progressing

## 2020-01-01 NOTE — PROGRESS NOTES
Daily Progress Note - Critical Care   Daksha Davis 34 y o  male MRN: [id number]  Unit/Bed#: MICU 06 Encounter: [id number]        ----------------------------------------------------------------------------------------  HPI/24hr events:   · Patient with increased agitation, moaning and retractions on pulmonary exam  · Progressive hypotension overnight requiring initiation of Epi Gtt  in addition to albumin bolus 5%, 500 mL  · Increased ketamine infusion  · Fentanyl infusions started  · Id consult yesterday:  Ampicillin frequency adjusted  · GI consult yesterday:  Hepatic duplex, NAC  · Goals of care discussion overnight with family and Dr Greg Ambriz  · Blood cultures from 12/30 positive for GPC in chains and pairs    ---------------------------------------------------------------------------------------  SUBJECTIVE  Patient encephalopathic and unable to provide overnight events discussed with the patient's mother and nursing staff at the bedside    Review of Systems   Unable to perform ROS: Mental status change     ---------------------------------------------------------------------------------------  Assessment and Plan:    Neuro:  Diagnosis:  Hepatic encephalopathy, hyperammonemia, agitation versus pain  Plan:  · Patient's mother at bedside desires goal to keep patient comfortable  · Management of PAD  · Analgesia:  P r n  Fentanyl (4 doses overnight)  · Sedation plan: Fentanyl 100 mcg/min,  Ketamine 0 2 mcg/kg/hr and Precedex 0 7 mcg/kg/hr;    Ketamine p r n  (3 doses overnight)  · RASS goal:-1 Drowsy and 0 Alert and Calm  · Delirium monitoring/management  · Regulate Sleep-wake cycle/CAM-ICU daily  · Serial Neuro Exams  Cardiac  Diagnosis: Septic Shock  Plan:  · Cardiac infusions: Epinephrine, 2 mcg/min, Levophed, 30 mcg/min, Neosynephrine, 180 mcg/min, Vasopressin, 0 04 Units/min  · Marick Protocol:  Thiamine, folic acid, and Hydrocortisone 50 IV Q6  · Wean EPI as able  · MAP goal > 65  · Rhythm: Sinus Tachycardia  · Follow rhythm on telemetry  Pulmonary  Diagnosis:  Acute hypoxemic respiratory insufficiency  Plan:  · Chlorhexidine ordered: yes  · Pulmonary toileting  · Wean FiO2 as able  · Supplemental oxygen for signs and symptoms of air hunger  Gastrointestinal  Diagnosis: SBO, Acute on Chronic hepatic failure, Hepatoblastoma and  HCC dx 2013 s/p Extended left hepatic lobectomy bile duct resection LND, HJ RYJ multiple rounds of chemo, TACE x 2 on Ramucirumad LD 12/17   Plan:  · GI recommendations appreciated  · NAC for ALISA-LACIE  · Hepatic Duplex  · Acute Hepatitis panel P  · EBV,CMV, HSV panel P  FEN  Diagnosis:  Protein caloric malnutrition secondary to lack of enteral access  Plan:  · Fluid/Diuretic plan:  Euvolemic to net positive  · Goal 24 hour fluid balance:  CVVHD  · Nutrition/diet plan:  NPO due to lack of enteral access  · Replete electrolytes with goals: K >4 0, Mag >2 0, and Phos >3 0  Genitourinary  Diagnosis: AZALIA on CVVHD  Plan:  · Nephrology following:  Continue CVVHD today  · Indwelling Heart present: yes   · Trend UOP and BUN/creat  · Strict I and O  · Avoid nephrotoxin agents  Infectious Diease  Diagnosis:  Septic shock, Enterococcal bacteremia, hx of Cdiff, increasing leukocytosis  Plan:  · Abx ordered: Abx D#5, Ampicillin D#3, Flagyl D#4  · TTE Today  · Repeat Blood cultures this AM  · Trend temps and WBC count  Procalcitonin:  Results from last 7 days   Lab Units 12/30/19  0757   PROCALCITONIN ng/ml 36 01*   ·   Heme:   Diagnosis:  Anemia, coagulopathy  Plan:  · Trend hgb and plts  · Transfuse as needed for goal hgb >7  Endo:   Diagnosis:  Hypoglycemia secondary to hepatic dysfunction  Plan:  · Continue D10 infusion  MSK/Skin:  · Early Mobility/Exercise  · PT consult: not applicable  · OT consult: not applicable  · Turn and position patient Q2 hours  · Off load pressure points  · Allevyn preventative per protocol    Family:  · Family updated within 24 hours: yes, patient's mother at bedside, update provided by myself this morning    Disposition: Continue ICU care    Code Status: Level 3 - DNAR and DNI  ---------------------------------------------------------------------------------------  ICU CORE MEASURES    Prophylaxis   VTE Pharmacologic Prophylaxis: Pharmacologic VTE Prophylaxis contraindicated due to Coagulopathy  VTE Mechanical Prophylaxis: sequential compression device  Stress Ulcer Prophylaxis: Pantoprazole IV     ABCDE Protocol (if indicated)  Plan to perform spontaneous awakening trial today? Not applicable  Plan to perform spontaneous breathing trial today? Not applicable  Obvious barriers to extubation? Not applicable  CAM-ICU:  Unable to assess secondary to encephalopathy    Invasive Devices Review  Invasive Devices     Peripheral Intravenous Line            Peripheral IV 12/28/19 Right Antecubital 3 days    Peripheral IV 12/28/19 Right Forearm 3 days    Peripheral IV 12/31/19 Right;Upper Arm less than 1 day    Peripheral IV 12/31/19 Right;Upper Arm less than 1 day          Arterial Line            Arterial Line 12/29/19 Radial 3 days          Hemodialysis Catheter            HD Temporary Double Catheter 3 days          Drain            Urethral Catheter Temperature probe 3 days              Can any invasive devices be discontinued today? No (provide explanation):  Patient critically ill with CVVHD and vasopressors  ---------------------------------------------------------------------------------------  OBJECTIVE    Physical Exam   Constitutional: He appears lethargic  He appears toxic  He appears ill  Nasal cannula in place  HENT:   Head: Normocephalic and atraumatic  Mouth/Throat: Mucous membranes are dry  Eyes: Pupils are equal, round, and reactive to light  Right eye exhibits chemosis  Left eye exhibits chemosis  Right conjunctiva is injected  Right conjunctiva has no hemorrhage  Left conjunctiva is injected  Scleral icterus is present     Cardiovascular: Normal rate and regular rhythm  Exam reveals distant heart sounds  Exam reveals no gallop and no friction rub  No murmur heard  Pulses:       Radial pulses are 1+ on the right side, and 1+ on the left side  Dorsalis pedis pulses are 1+ on the right side, and 1+ on the left side  Pulmonary/Chest: He has decreased breath sounds in the right lower field and the left lower field  Patient with intermittent episodes of tachypnea, nasal flaring, and retractions  Improves with p r n  Analgesia and sedation medications  Abdominal: Normal appearance  Bowel sounds are absent  Unable to assess tenderness secondary to encephalopathy   Genitourinary:   Genitourinary Comments: Heart to gravity   Neurological: He appears lethargic  Patient's eyes spontaneously does not track examiner, does not blink to threat no response to peripheral central noxious stimuli, patient intermittently tachypneic and moaning  Skin: Skin is warm  Capillary refill takes less than 2 seconds  Patient Jaundiced  1+ edema lower extremities, 1+ edema upper extremities       Vitals   Vitals:    20 0500 20 0530 20 0600 20 0700   BP:       BP Location:       Pulse: 100  102 98   Resp: 18  13 12   Temp:   97 7 °F (36 5 °C)    TempSrc:       SpO2: 99%  100% 100%   Weight:  95 8 kg (211 lb 3 2 oz)     Height:         Temp (24hrs), Av 8 °F (36 6 °C), Min:97 °F (36 1 °C), Max:98 8 °F (37 1 °C)  Current: Temperature: 97 7 °F (36 5 °C)  HR: 98  BP: 114/64 (80)  RR: 16  SpO2: 99% on 2 L NC      Height and Weights   Height: 6' 6" (198 1 cm)  IBW: 91 4 kg  Body mass index is 24 41 kg/m²    Weight (last 2 days)     Date/Time   Weight    20 0530   95 8 (211 2)    19 0843   94 3 (207 89)    19 0557   94 3 (207 89)    19 0600   95 2 (209 88)              Intake and Output  I/O        0701 -  0700  07 -  0700  07 -  0700    I V  (mL/kg) 6411 7 (68) 5450 2 (56 9)     IV Piggyback  2 3910 2 Total Intake(mL/kg) 8342 9 (88 5) 9363 4 (97 7)     Urine (mL/kg/hr) 5 (0) 5 (0)     Other 8219 7362     Stool       Total Output 8281 7385     Net +118 9 +1996 4                    Nutrition       Diet Orders   (From admission, onward)             Start     Ordered    12/29/19 0212  Diet NPO  Diet effective now     Question Answer Comment   Diet Type NPO    RD to adjust diet per protocol?  Yes        12/29/19 0213                  Laboratory and Diagnostics:  Results from last 7 days   Lab Units 01/01/20  0552 12/31/19  0436 12/30/19  0532 12/29/19  1005 12/29/19  0608 12/29/19  0422 12/28/19  2330 12/28/19  2145 12/28/19  2144   WBC Thousand/uL 75 44* 54 54* 45 98*  --  46 36*  --   --   --  43 88*   HEMOGLOBIN g/dL 7 9* 8 1* 8 1* 8 6* 8 6*  --   --   --  10 2*   I STAT HEMOGLOBIN g/dl  --   --   --   --   --   --  9 2* 11 9*  --    HEMATOCRIT % 23 7* 23 8* 23 5* 25 2* 26 2*  --   --   --  32 6*   HEMATOCRIT, ISTAT %  --   --   --   --   --   --  27* 35*  --    PLATELETS Thousands/uL 335 466* 567*  --  667* 656*  --   --  811*   BANDS PCT % 3  --   --   --   --   --   --   --  10*   MONO PCT % 8 6 3*  --  9  --   --   --  7     Results from last 7 days   Lab Units 01/01/20  0552 01/01/20  0005 12/31/19  1738 12/31/19  1213 12/31/19  0553 12/31/19  0436 12/30/19  2347  12/30/19  0532  12/29/19  1802  12/29/19  0222   SODIUM mmol/L 138 141 141 140 139 139 140   < > 136  136   < > 135*   < > 126*   POTASSIUM mmol/L 4 8 4 8 4 4 4 6 3 9 3 9 4 0   < > 4 1  4 1   < > 4 6   < > 6 7*   CHLORIDE mmol/L 107 107 110* 111* 109* 109* 110*   < > 105  105   < > 102   < > 94*   CO2 mmol/L 17* 17* 19* 18* 18* 20* 18*   < > 18*  17*   < > 15*   < > 12*   ANION GAP mmol/L 14* 17* 12 11 12 10 12   < > 13  14*   < > 18*   < > 20*   BUN mg/dL 16 18 19 20 19 19 20   < > 24  25   < > 32*   < > 53*   CREATININE mg/dL 2 44* 2 45* 2 39* 2 41* 2 53* 2 71* 2 59*   < > 2 98*  2 98*   < > 3 24*   < > 4 56*   CALCIUM mg/dL 9 1 8 1* 8 4 8 5 8 4 8 6 8 5   < > 8 4  8 4   < > 7 7*   < > 6 7*   GLUCOSE RANDOM mg/dL 79 79 97 83 90 88 100   < > 75  78   < > 77   < > 39*   ALT U/L 1,274*  --   --   --   --  1,614*  --   --  2,005*  --  2,246*  --  2,338*   AST U/L 3,477*  --   --   --   --  5,445*  --   --  7,282*  --  11,037*  --  13,166*   ALK PHOS U/L 1,061*  --   --   --   --  1,151*  --   --  1,205*  --  1,411*  --  1,521*   ALBUMIN g/dL 2 1*  --   --   --   --  1 8*  --   --  1 9*  --  2 0*  --  2 1*   TOTAL BILIRUBIN mg/dL 29 08*  --   --   --   --  26 05*  --   --  26 74*  --  26 85*  --  32 51*    < > = values in this interval not displayed  Results from last 7 days   Lab Units 01/01/20  0552 01/01/20  0005 12/31/19  1738 12/31/19  1213 12/31/19  0553 12/30/19  2347 12/30/19  1801   MAGNESIUM mg/dL 2 2 1 8 2 0 1 9 2 0 2 0 1 9   PHOSPHORUS mg/dL 3 8 3 5 3 3 2 5* 1 3* 2 6* 3 5      Results from last 7 days   Lab Units 01/01/20  0552 12/31/19  0436 12/30/19  0532 12/29/19  1215 12/29/19  0422 12/28/19  2144   INR  4 82* 6 60* 10 30* >15 00* >15 00*  --    PTT seconds  --   --   --  85* 87* 83*      Results from last 7 days   Lab Units 12/29/19  1005 12/29/19  0608 12/29/19  0423 12/28/19  2144   TROPONIN I ng/mL 2 42* 1 14* 0 70* 0 21*       Results from last 7 days   Lab Units 12/30/19  0757   PROCALCITONIN ng/ml 36 01*       Micro  Results from last 7 days   Lab Units 12/30/19  0544 12/30/19  0534 12/28/19  2246 12/28/19  2230   BLOOD CULTURE   --   --  Enterococcus faecalis* Enterococcus faecalis*   GRAM STAIN RESULT  Gram positive cocci in chains* Gram positive cocci in pairs and chains* Gram positive cocci in pairs and chains* Gram positive cocci in pairs and chains*       EKG: SR on tele  Imaging:   US abdomen 12/31: Enlarged liver containing numerous masses in keeping with provided history of [address]  Difficult Doppler evaluation due to marked liver abnormality   The portal and hepatic veins are not identified and could be occluded or severely extrinsically compressed by liver masses  There is reversal of flow within the patent splenic and superior   mesenteric veins  The hepatic artery is patent with normal arterial waveform on spectral Doppler imaging       Splenomegaly      CBD is not visualized    No intrahepatic ductal dilation      No hydronephrosis      Active Medications  Scheduled Meds:  Current Facility-Administered Medications:  acetylcysteine 6 25 mg/kg Intravenous Once Rosita Franks MD Last Rate: 580 mg (01/01/20 0122)   ampicillin 2,000 mg Intravenous Q6H Aria Russell MD Last Rate: Stopped (01/01/20 0400)   artificial tear  Both Eyes BID Kunal Veronica PA-C    ascorbic acid in sodium chloride 0 9% 100 mL IVPB 1,500 mg Intravenous Q6H Sujit Robledo PA-C Last Rate: Stopped (01/01/20 0501)   dexmedetomidine 0 1-0 7 mcg/kg/hr Intravenous Titrated Tanesha Garcia MD Last Rate: 0 7 mcg/kg/hr (01/01/20 0258)   epinephrine 1-10 mcg/min Intravenous Titrated Tanesha Garcia MD Last Rate: 1 mcg/min (01/01/20 8737)   fentaNYL 100 mcg/hr Intravenous Continuous Tanesha Garcia MD Last Rate: 100 mcg/hr (01/01/20 0521)   fentanyl citrate (PF) 50 mcg Intravenous Q1H PRN Tanesha Garcia MD    hydrocortisone sodium succinate 50 mg Intravenous Q6H Aamir Veronica PA-C    ketamine 0 2 mg/kg/hr Intravenous Continuous Josesito Garcia MD Last Rate: 0 2 mg/kg/hr (01/01/20 0019)   Ketamine HCl 50 mg Intravenous Q2H PRN Harika Martinez MD    metroNIDAZOLE 500 mg Intravenous BID Harika Martinez MD Last Rate: 500 mg (12/31/19 7233)   norepinephrine 1-30 mcg/min Intravenous Titrated Kunal Veronica PA-C Last Rate: 30 mcg/min (01/01/20 0445)   NxStage K 4/Ca 3 20,000 mL Dialysis Continuous Tiffany K Rubi, DO Last Rate: 0 mL (01/01/20 0259)   pantoprazole 40 mg Intravenous Q24H [address] Aamir Veronica PA-C    phenylephine  mcg/min Intravenous Titrated Kunal Veronica PA-C Last Rate: 180 mcg/min (01/01/20 0331)   dextrose 10 % and sodium chloride 0 9 % 100 mL/hr Intravenous Continuous Marcelina Garcia MD Last Rate: 100 mL/hr (01/01/20 0053)   thiamine 200 mg Intravenous Q12H Abbe Downs PA-C Last Rate: Stopped (01/01/20 0100)   vasopressin (PITRESSIN) in 0 9 % sodium chloride 100 mL 0 04 Units/min Intravenous Continuous Josesito Garcia MD Last Rate: 0 04 Units/min (01/01/20 0258)     Continuous Infusions:    dexmedetomidine 0 1-0 7 mcg/kg/hr Last Rate: 0 7 mcg/kg/hr (01/01/20 0258)   epinephrine 1-10 mcg/min Last Rate: 1 mcg/min (01/01/20 7587)   fentaNYL 100 mcg/hr Last Rate: 100 mcg/hr (01/01/20 0521)   ketamine 0 2 mg/kg/hr Last Rate: 0 2 mg/kg/hr (01/01/20 0019)   norepinephrine 1-30 mcg/min Last Rate: 30 mcg/min (01/01/20 0445)   NxStage K 4/Ca 3 20,000 mL Last Rate: 0 mL (01/01/20 0259)   phenylephine  mcg/min Last Rate: 180 mcg/min (01/01/20 0331)   dextrose 10 % and sodium chloride 0 9 % 100 mL/hr Last Rate: 100 mL/hr (01/01/20 0053)   vasopressin (PITRESSIN) in 0 9 % sodium chloride 100 mL 0 04 Units/min Last Rate: 0 04 Units/min (01/01/20 0258)     PRN Meds:     fentanyl citrate (PF) 50 mcg Q1H PRN   Ketamine HCl 50 mg Q2H PRN       Allergies   No Known Allergies    Advance Directive and Living Will:      Power of :    POLST:      Counseling / Coordination of Care:  37 mins of critical care time, excludes procedures and time spent with family at bedside  SHANA Au      Portions of the record may have been created with voice recognition software  Occasional wrong word or "sound a like" substitutions may have occurred due to the inherent limitations of voice recognition software    Read the chart carefully and recognize, using context, where substitutions have occurred

## 2020-01-01 NOTE — PROGRESS NOTES
Patient appeared uncomfortable, groaning and increased work of breathing noted  Systolic blood pressure dropped to the 70's  Patient was already on the maximum dosages of the ordered vasopressors  Dr Nicole Love called and he was at the bedside  Orders received for ketamine and albumin, see MAR for administration details  Family at the bedside

## 2020-01-02 NOTE — CONSULTS
Oncology Consult Note  Reno Steele 34 y o  male MRN: [id number]  Unit/Bed#: MICU 06 Encounter: [id number]      Presenting Complaint:  Leukocytosis, normal differential, history of hepatoblastoma, hepatocellular carcinoma progression on multiple lines of therapy    History of Presenting Illness:     The father and the mother in the room with the patient in the bed not responsive    This is sad story for 30-year-old  male who was diagnosed in 10/2013 with mass measuring 14 cm in the right lobe of the liver with alpha fetoprotein of 53,000 consistent with hepatoblastoma biopsy showed well-differentiated hepatocellular carcinoma status post left lobe hepatectomy, bile duct resection, lymph node dissection of the liver hilum at [facility] he completed 4 cycles of cisplatin/doxorubicin/dexrazoxane, vincristine, 5 FU in 2015 he had recurrent hepatoblastoma he received irinotecan, vincristine, temsirolimus status post TACE many times sign multiple clinical studies involving FGFR2 gene fusion he received Lenvatinib, Ramucirumab, nivolumab/Abemaciclib on clinical study with progression and then on 12/19/2019 he received Ramucirumab at [address] [place] [address]    With elevation of liver enzymes    Admitted yesterday for change in mental status, worsening renal output CT scan of the abdomen pelvis showed significant progression of disease in the liver, he was initiated on CVVHD, multiple pressors, currently in the ICU with significant jaundice, low respiratory rate of 6 per minute    WBC 61040, hemoglobin 7 9, MCV 83, platelets [id number], 02% neutrophils, 3% bands, 4% metamyelocytes, % promyelocytes, 1% monocytes, 4% atypical lymphocytes, 14% nucleated RBC, creatinine 2 45, INR 4, AST 5000, ALT 1600, alkaline phosphatase 1151, total protein 4 6, albumin 1 8, bilirubin 26    I did not do physical examination on the patient by his mother request    Review of Systems - As stated in the HPI otherwise the fourteen point review of systems was negative  No past medical history on file      Social History     Socioeconomic History    Marital status: Single     Spouse name: Not on file    Number of children: Not on file    Years of education: Not on file    Highest education level: Not on file   Occupational History    Not on file   Social Needs    Financial resource strain: Not on file    Food insecurity:     Worry: Not on file     Inability: Not on file    Transportation needs:     Medical: Not on file     Non-medical: Not on file   Tobacco Use    Smoking status: Never Smoker    Smokeless tobacco: Never Used   Substance and Sexual Activity    Alcohol use: Never     Frequency: Never     Binge frequency: Never     Comment: n/a    Drug use: No    Sexual activity: Not on file   Lifestyle    Physical activity:     Days per week: Not on file     Minutes per session: Not on file    Stress: Not on file   Relationships    Social connections:     Talks on phone: Not on file     Gets together: Not on file     Attends [employer] service: Not on file     Active member of club or organization: Not on file     Attends meetings of clubs or organizations: Not on file     Relationship status: Not on file    Intimate partner violence:     Fear of current or ex partner: Not on file     Emotionally abused: Not on file     Physically abused: Not on file     Forced sexual activity: Not on file   Other Topics Concern    Not on file   Social History Narrative    Not on file       Family History   Problem Relation Age of Onset    Diabetes Father     Breast cancer Maternal Grandmother     Breast cancer Paternal Grandmother     Ovarian cancer Paternal Grandmother     Stomach cancer Family        No Known Allergies      Current Facility-Administered Medications:     acetylcysteine (ACETADOTE) 9,540 mg in dextrose 5 % 1,000 mL IVPB, 100 mg/kg, Intravenous, Continuous, SHANA Perales, Last Rate: 65 5 mL/hr at 01/02/20 1018, 9,540 mg at 01/02/20 1018    ampicillin (OMNIPEN) 2,000 mg in sodium chloride 0 9 % 100 mL IVPB, 2,000 mg, Intravenous, Q6H, Larry Bill MD, Last Rate: 200 mL/hr at 01/02/20 0928, 2,000 mg at 01/02/20 3120    artificial tear (LUBRIFRESH P M ) ophthalmic ointment, , Both Eyes, BID, Aamir Veronica PA-C    dexmedetomidine (PRECEDEX) 400 mcg in sodium chloride 0 9 % 100 mL infusion, 0 1-0 7 mcg/kg/hr, Intravenous, Titrated, Maury Garcia MD, Last Rate: 16 7 mL/hr at 01/02/20 0436, 0 7 mcg/kg/hr at 01/02/20 0436    EPINEPHrine 3000 mcg (STANDARD CONCENTRATION) IV in sodium chloride 0 9% 250 mL, 1-10 mcg/min, Intravenous, Titrated, Maury Garcia MD, Stopped at 01/02/20 1226    fentaNYL 1000 mcg in sodium chloride 0 9% 100mL infusion, 100 mcg/hr, Intravenous, Continuous, Maury Garcia MD, Stopped at 01/02/20 0956    fentanyl citrate (PF) 100 MCG/2ML 50 mcg, 50 mcg, Intravenous, Q1H PRN, Maury Garcia MD, 50 mcg at 01/02/20 0358    hydrocortisone sodium succinate (PF) (Solu-CORTEF) injection 50 mg, 50 mg, Intravenous, Q6H, Aamir Veronica PA-C, 50 mg at 01/02/20 0809    ketamine 250 mg (STANDARD CONCENTRATION) IV in sodium chloride 0 9% 250 mL, 0 2 mg/kg/hr, Intravenous, Continuous, Josesito Garcia MD, Last Rate: 19 1 mL/hr at 01/02/20 0452, 0 2 mg/kg/hr at 01/02/20 0452    Ketamine HCl 50 mg, 50 mg, Intravenous, Q2H PRN, Christina Serrano MD, 50 mg at 01/02/20 0736    metroNIDAZOLE (FLAGYL) IVPB (premix) 500 mg, 500 mg, Intravenous, BID, Christina Serrano MD, Last Rate: 200 mL/hr at 01/02/20 0809, 500 mg at 01/02/20 0809    norepinephrine (LEVOPHED) 8 mg (DOUBLE CONCENTRATION) IV in sodium chloride 0 9% 250 mL, 1-30 mcg/min, Intravenous, Titrated, Raisa Montoya DO    NxStage K 2/Ca 3 dialysis solution (RFP-400) 20,000 mL, 20,000 mL, Dialysis, Continuous, Tiffany Venegas DO, 20,000 mL at 01/02/20 0731    pantoprazole (PROTONIX) injection 40 mg, 40 mg, Intravenous, Q24H [facility] [place] [place] Sandhoff Cross, PA-C, 40 mg at 01/02/20 1817    phenylephrine (RYNE-SYNEPHRINE) 100 mg (DOUBLE CONCENTRATION) IV in sodium chloride 0 9% 250 mL,  mcg/min, Intravenous, Titrated, Ant Be DO    sodium chloride (concentrated) 154 mEq in dextrose 10 % 1,000 mL infusion, 100 mL/hr, Intravenous, Continuous, Josesito Garcia MD, Last Rate: 100 mL/hr at 01/02/20 0140, 100 mL/hr at 01/02/20 0140    vasopressin (PITRESSIN) 20 Units in sodium chloride 0 9 % 100 mL infusion, 0 04 Units/min, Intravenous, Continuous, Josesito Garcia MD, Last Rate: 12 mL/hr at 01/02/20 0728, 0 04 Units/min at 01/02/20 0728      BP (!) 72/43   Pulse 92   Temp 97 5 °F (36 4 °C) (Bladder)   Resp (!) 5   Ht 6' 6" (1 981 m)   Wt 95 4 kg (210 lb 5 1 oz)   SpO2 98%   BMI 24 30 kg/m²       General Appearance:         Eyes:     Ears:     Nose:    Throat:    Neck:        Lungs:      Chest Wall:      Heart:         Abdomen:              Extremities:        Skin:    Lymph nodes:    Neurologic:        Recent Results (from the past 48 hour(s))   Fingerstick Glucose (POCT)    Collection Time: 12/31/19 10:41 AM   Result Value Ref Range    POC Glucose 90 65 - 140 mg/dl   Fingerstick Glucose (POCT)    Collection Time: 12/31/19 12:11 PM   Result Value Ref Range    POC Glucose 104 65 - 140 mg/dl   Basic metabolic panel    Collection Time: 12/31/19 12:13 PM   Result Value Ref Range    Sodium 140 136 - 145 mmol/L    Potassium 4 6 3 5 - 5 3 mmol/L    Chloride 111 (H) 100 - 108 mmol/L    CO2 18 (L) 21 - 32 mmol/L    ANION GAP 11 4 - 13 mmol/L    BUN 20 5 - 25 mg/dL    Creatinine 2 41 (H) 0 60 - 1 30 mg/dL    Glucose 83 65 - 140 mg/dL    Calcium 8 5 8 3 - 10 1 mg/dL    eGFR 35 ml/min/1 73sq m   Calcium, ionized    Collection Time: 12/31/19 12:13 PM   Result Value Ref Range    Calcium, Ionized 1 10 (L) 1 12 - 1 32 mmol/L   Phosphorus    Collection Time: 12/31/19 12:13 PM   Result Value Ref Range    Phosphorus 2 5 (L) 2 7 - 4 5 mg/dL   Magnesium    Collection Time: 12/31/19 12:13 PM   Result Value Ref Range    Magnesium 1 9 1 6 - 2 6 mg/dL   Fingerstick Glucose (POCT)    Collection Time: 12/31/19  2:40 PM   Result Value Ref Range    POC Glucose 124 65 - 140 mg/dl   Fingerstick Glucose (POCT)    Collection Time: 12/31/19  4:25 PM   Result Value Ref Range    POC Glucose 108 65 - 140 mg/dl   Fingerstick Glucose (POCT)    Collection Time: 12/31/19  5:37 PM   Result Value Ref Range    POC Glucose 120 65 - 140 mg/dl   Basic metabolic panel    Collection Time: 12/31/19  5:38 PM   Result Value Ref Range    Sodium 141 136 - 145 mmol/L    Potassium 4 4 3 5 - 5 3 mmol/L    Chloride 110 (H) 100 - 108 mmol/L    CO2 19 (L) 21 - 32 mmol/L    ANION GAP 12 4 - 13 mmol/L    BUN 19 5 - 25 mg/dL    Creatinine 2 39 (H) 0 60 - 1 30 mg/dL    Glucose 97 65 - 140 mg/dL    Calcium 8 4 8 3 - 10 1 mg/dL    eGFR 35 ml/min/1 73sq m   Calcium, ionized    Collection Time: 12/31/19  5:38 PM   Result Value Ref Range    Calcium, Ionized 1 07 (L) 1 12 - 1 32 mmol/L   Phosphorus    Collection Time: 12/31/19  5:38 PM   Result Value Ref Range    Phosphorus 3 3 2 7 - 4 5 mg/dL   Magnesium    Collection Time: 12/31/19  5:38 PM   Result Value Ref Range    Magnesium 2 0 1 6 - 2 6 mg/dL   Hepatitis panel, acute    Collection Time: 12/31/19  8:39 PM   Result Value Ref Range    Hepatitis B Surface Ag Non-reactive Non-reactive, NonReactive - Confirmed    Hep A IgM Non-reactive Non-reactive, Equivocal-Suggest Recollect    Hepatitis C Ab Non-reactive Non-reactive    Hep B C IgM Non-reactive Non-reactive   Fingerstick Glucose (POCT)    Collection Time: 12/31/19  8:41 PM   Result Value Ref Range    POC Glucose 94 65 - 140 mg/dl   Basic metabolic panel    Collection Time: 01/01/20 12:05 AM   Result Value Ref Range    Sodium 141 136 - 145 mmol/L    Potassium 4 8 3 5 - 5 3 mmol/L    Chloride 107 100 - 108 mmol/L    CO2 17 (L) 21 - 32 mmol/L    ANION GAP 17 (H) 4 - 13 mmol/L    BUN 18 5 - 25 mg/dL    Creatinine 2 45 (H) 0 60 - 1 30 mg/dL    Glucose 79 65 - 140 mg/dL    Calcium 8 1 (L) 8 3 - 10 1 mg/dL    eGFR 34 ml/min/1 73sq m   Calcium, ionized    Collection Time: 01/01/20 12:05 AM   Result Value Ref Range    Calcium, Ionized 0 98 (L) 1 12 - 1 32 mmol/L   Phosphorus    Collection Time: 01/01/20 12:05 AM   Result Value Ref Range    Phosphorus 3 5 2 7 - 4 5 mg/dL   Magnesium    Collection Time: 01/01/20 12:05 AM   Result Value Ref Range    Magnesium 1 8 1 6 - 2 6 mg/dL   Fingerstick Glucose (POCT)    Collection Time: 01/01/20 12:08 AM   Result Value Ref Range    POC Glucose 100 65 - 140 mg/dl   Fingerstick Glucose (POCT)    Collection Time: 01/01/20  2:14 AM   Result Value Ref Range    POC Glucose 108 65 - 140 mg/dl   Fingerstick Glucose (POCT)    Collection Time: 01/01/20  4:04 AM   Result Value Ref Range    POC Glucose 88 65 - 140 mg/dl   Blood culture    Collection Time: 01/01/20  4:42 AM   Result Value Ref Range    Blood Culture Received in Microbiology Lab  Culture in Progress      Basic metabolic panel    Collection Time: 01/01/20  5:52 AM   Result Value Ref Range    Sodium 138 136 - 145 mmol/L    Potassium 4 8 3 5 - 5 3 mmol/L    Chloride 107 100 - 108 mmol/L    CO2 17 (L) 21 - 32 mmol/L    ANION GAP 14 (H) 4 - 13 mmol/L    BUN 16 5 - 25 mg/dL    Creatinine 2 44 (H) 0 60 - 1 30 mg/dL    Glucose 79 65 - 140 mg/dL    Calcium 9 1 8 3 - 10 1 mg/dL    eGFR 34 ml/min/1 73sq m   Calcium, ionized    Collection Time: 01/01/20  5:52 AM   Result Value Ref Range    Calcium, Ionized 1 07 (L) 1 12 - 1 32 mmol/L   Phosphorus    Collection Time: 01/01/20  5:52 AM   Result Value Ref Range    Phosphorus 3 8 2 7 - 4 5 mg/dL   Magnesium    Collection Time: 01/01/20  5:52 AM   Result Value Ref Range    Magnesium 2 2 1 6 - 2 6 mg/dL   Hepatic function panel    Collection Time: 01/01/20  5:52 AM   Result Value Ref Range    Total Bilirubin 29 08 (H) 0 20 - 1 00 mg/dL    Bilirubin, Direct 24 33 (H) 0 00 - 0 20 mg/dL    Alkaline Phosphatase 1,061 (H) 46 - 116 U/L    AST 3,477 (H) 5 - 45 U/L    ALT 1,274 (H) 12 - 78 U/L    Total Protein 4 9 (L) 6 4 - 8 2 g/dL    Albumin 2 1 (L) 3 5 - 5 0 g/dL   Ammonia    Collection Time: 01/01/20  5:52 AM   Result Value Ref Range    Ammonia 56 (H) 11 - 35 umol/L   Protime-INR    Collection Time: 01/01/20  5:52 AM   Result Value Ref Range    Protime 44 6 (H) 11 6 - 14 5 seconds    INR 4 82 (H) 0 84 - 1 19   CBC and differential    Collection Time: 01/01/20  5:52 AM   Result Value Ref Range    WBC 75 44 (HH) 4 31 - 10 16 Thousand/uL    RBC 2 87 (L) 3 88 - 5 62 Million/uL    Hemoglobin 7 9 (L) 12 0 - 17 0 g/dL    Hematocrit 23 7 (L) 36 5 - 49 3 %    MCV 83 82 - 98 fL    MCH 27 5 26 8 - 34 3 pg    MCHC 33 3 31 4 - 37 4 g/dL    RDW 23 0 (H) 11 6 - 15 1 %    MPV 10 7 8 9 - 12 7 fL    Platelets 201 484 - 676 Thousands/uL    nRBC 9 /100 WBCs   Manual Differential(PHLEBS Do Not Order)    Collection Time: 01/01/20  5:52 AM   Result Value Ref Range    Segmented % 77 (H) 43 - 75 %    Bands % 3 0 - 8 %    Lymphocytes % 1 (L) 14 - 44 %    Monocytes % 8 4 - 12 %    Eosinophils, % 0 0 - 6 %    Basophils % 0 0 - 1 %    Metamyelocytes% 4 (H) 0 - 1 %    Myelocytes % 1 0 - 1 %    Promyelocytes % 2 (H) 0 - 0 %    Atypical Lymphocytes % 4 (H) <=0 %    Absolute Neutrophils 60 35 (H) 1 85 - 7 62 Thousand/uL    Lymphocytes Absolute 0 75 0 60 - 4 47 Thousand/uL    Monocytes Absolute 6 04 (H) 0 00 - 1 22 Thousand/uL    Eosinophils Absolute 0 00 0 00 - 0 40 Thousand/uL    Basophils Absolute 0 00 0 00 - 0 10 Thousand/uL    Total Counted      nRBC 14 (H) 0 - 2 /100 WBC    RBC Morphology Present     Anisocytosis Present     Lissette Cells Present     Poikilocytes Present     Polychromasia Present     Target Cells Present     Platelet Estimate Adequate Adequate   Fingerstick Glucose (POCT)    Collection Time: 01/01/20  5:54 AM   Result Value Ref Range    POC Glucose 98 65 - 140 mg/dl   Fingerstick Glucose (POCT)    Collection Time: 01/01/20  9:07 AM   Result Value Ref Range    POC Glucose 91 65 - 140 mg/dl   Lactic acid, plasma    Collection Time: 01/01/20  9:20 AM   Result Value Ref Range    LACTIC ACID 6 7 (HH) 0 5 - 2 0 mmol/L   Urinalysis with microscopic    Collection Time: 01/01/20 11:42 AM   Result Value Ref Range    Clarity, UA Clear     Color, UA Brown     Specific Somers, UA 1 025 1 003 - 1 030    pH, UA Interference-unable to analyze (A) 4 5, 5 0, 5 5, 6 0, 6 5, 7 0, 7 5, 8 0    Glucose, UA Interference- unable to analyze Negative mg/dl    Ketones, UA Interference- unable to analyze (A) Negative mg/dl    Blood, UA Interference- unable to analyze (A) Negative    Protein, UA Interference- unable to analyze (A) Negative mg/dl    Nitrite, UA Interference- unable to analyze Negative    Bilirubin, UA Interference- unable to analyze (A) Negative    Urobilinogen, UA Interference-unable to analyze 0 2, 1 0 E U /dl E U /dl    Leukocytes, UA Interference- unable to analyze (A) Negative    WBC, UA 2-4 (A) None Seen, 0-5, 5-55, 5-65 /hpf    RBC, UA 2-4 (A) None Seen, 0-5 /hpf    Bacteria, UA Occasional None Seen, Occasional /hpf    Epithelial Cells Occasional None Seen, Occasional /hpf   Chloride, urine, random    Collection Time: 01/01/20 11:42 AM   Result Value Ref Range    Chloride, Ur 96 mmol/L   Creatinine, urine, random    Collection Time: 01/01/20 11:42 AM   Result Value Ref Range    Creatinine, Ur 14 9 mg/dL   Sodium, urine, random    Collection Time: 01/01/20 11:42 AM   Result Value Ref Range    Sodium, Ur 102    Urea nitrogen, urine    Collection Time: 01/01/20 11:42 AM   Result Value Ref Range    Urea Nitrogen, Ur 44 mg/dL   Basic metabolic panel    Collection Time: 01/01/20 12:10 PM   Result Value Ref Range    Sodium 137 136 - 145 mmol/L    Potassium 4 9 3 5 - 5 3 mmol/L    Chloride 107 100 - 108 mmol/L    CO2 20 (L) 21 - 32 mmol/L    ANION GAP 10 4 - 13 mmol/L    BUN 16 5 - 25 mg/dL    Creatinine 2 25 (H) 0 60 - 1 30 mg/dL    Glucose 57 (L) 65 - 140 mg/dL    Calcium 8 9 8 3 - 10 1 mg/dL    eGFR 38 ml/min/1 73sq m   Calcium, ionized    Collection Time: 01/01/20 12:10 PM   Result Value Ref Range    Calcium, Ionized 1 13 1 12 - 1 32 mmol/L   Phosphorus    Collection Time: 01/01/20 12:10 PM   Result Value Ref Range    Phosphorus 3 9 2 7 - 4 5 mg/dL   Magnesium    Collection Time: 01/01/20 12:10 PM   Result Value Ref Range    Magnesium 2 0 1 6 - 2 6 mg/dL   Fingerstick Glucose (POCT)    Collection Time: 01/01/20 12:14 PM   Result Value Ref Range    POC Glucose 75 65 - 140 mg/dl   Fingerstick Glucose (POCT)    Collection Time: 01/01/20  1:47 PM   Result Value Ref Range    POC Glucose 90 65 - 140 mg/dl   Blood culture    Collection Time: 01/01/20  2:40 PM   Result Value Ref Range    Blood Culture Received in Microbiology Lab  Culture in Progress      Fingerstick Glucose (POCT)    Collection Time: 01/01/20  3:48 PM   Result Value Ref Range    POC Glucose 97 65 - 140 mg/dl   Calcium, ionized    Collection Time: 01/01/20  6:09 PM   Result Value Ref Range    Calcium, Ionized 1 12 1 12 - 1 32 mmol/L   Magnesium    Collection Time: 01/01/20  6:09 PM   Result Value Ref Range    Magnesium 2 0 1 6 - 2 6 mg/dL   Phosphorus    Collection Time: 01/01/20  6:09 PM   Result Value Ref Range    Phosphorus 3 9 2 7 - 4 5 mg/dL   Fingerstick Glucose (POCT)    Collection Time: 01/01/20  6:09 PM   Result Value Ref Range    POC Glucose 111 65 - 140 mg/dl   Basic metabolic panel    Collection Time: 01/01/20  6:11 PM   Result Value Ref Range    Sodium 136 136 - 145 mmol/L    Potassium 4 7 3 5 - 5 3 mmol/L    Chloride 106 100 - 108 mmol/L    CO2 20 (L) 21 - 32 mmol/L    ANION GAP 10 4 - 13 mmol/L    BUN 16 5 - 25 mg/dL    Creatinine 2 15 (H) 0 60 - 1 30 mg/dL    Glucose 97 65 - 140 mg/dL    Calcium 8 6 8 3 - 10 1 mg/dL    eGFR 40 ml/min/1 73sq m   Fingerstick Glucose (POCT)    Collection Time: 01/01/20  8:29 PM   Result Value Ref Range    POC Glucose 129 65 - 140 mg/dl   Basic metabolic panel    Collection Time: 01/01/20 11:37 PM   Result Value Ref Range    Sodium 139 136 - 145 mmol/L    Potassium 5 1 3 5 - 5 3 mmol/L    Chloride 106 100 - 108 mmol/L    CO2 21 21 - 32 mmol/L    ANION GAP 12 4 - 13 mmol/L    BUN 16 5 - 25 mg/dL    Creatinine 2 44 (H) 0 60 - 1 30 mg/dL    Glucose 118 65 - 140 mg/dL    Calcium 8 6 8 3 - 10 1 mg/dL    eGFR 34 ml/min/1 73sq m   Calcium, ionized    Collection Time: 01/01/20 11:37 PM   Result Value Ref Range    Calcium, Ionized 1 14 1 12 - 1 32 mmol/L   Magnesium    Collection Time: 01/01/20 11:37 PM   Result Value Ref Range    Magnesium 2 0 1 6 - 2 6 mg/dL   Phosphorus    Collection Time: 01/01/20 11:37 PM   Result Value Ref Range    Phosphorus 3 7 2 7 - 4 5 mg/dL   Fingerstick Glucose (POCT)    Collection Time: 01/02/20 12:00 AM   Result Value Ref Range    POC Glucose 147 (H) 65 - 140 mg/dl   POCT Blood Gas (CG8+)    Collection Time: 01/02/20  4:08 AM   Result Value Ref Range    pH, Art i-STAT 7 313 (L) 7 350 - 7 450    pCO2, Art i-STAT 42 5 36 0 - 44 0 mm HG    pO2, ART i-STAT 92 0 75 0 - 129 0 mm HG    BE, i-STAT -4 (L) -2 - 3 mmol/L    HCO3, Art i-STAT 21 5 (L) 22 0 - 28 0 mmol/L    CO2, i-STAT 23 21 - 32 mmol/L    O2 Sat, i-STAT 96 (H) 60 - 85 %    SODIUM, I-STAT 137 136 - 145 mmol/l    Potassium, i-STAT 4 6 3 5 - 5 3 mmol/L    Calcium, Ionized i-STAT 1 20 1  12 - 1 32 mmol/L    Hct, i-STAT 26 (L) 36 5 - 49 3 %    Hgb, i-STAT 8 8 (L) 12 0 - 17 0 g/dl    Glucose, i-STAT 125 65 - 140 mg/dl    Specimen Type ARTERIAL    Protime-INR    Collection Time: 01/02/20  4:28 AM   Result Value Ref Range    Protime 42 7 (H) 11 6 - 14 5 seconds    INR 4 56 (H) 0 84 - 1 19   Hepatic function panel    Collection Time: 01/02/20  4:28 AM   Result Value Ref Range    Total Bilirubin 28 45 (H) 0 20 - 1 00 mg/dL    Bilirubin, Direct 23 98 (H) 0 00 - 0 20 mg/dL    Alkaline Phosphatase 1,038 (H) 46 - 116 U/L    AST 2,423 (H) 5 - 45 U/L     (H) 12 - 78 U/L    Total Protein 4 6 (L) 6 4 - 8 2 g/dL    Albumin 1 9 (L) 3 5 - 5 0 g/dL   Ammonia Collection Time: 01/02/20  4:28 AM   Result Value Ref Range    Ammonia 57 (H) 11 - 35 umol/L   CBC    Collection Time: 01/02/20  4:28 AM   Result Value Ref Range    WBC 80 22 (HH) 4 31 - 10 16 Thousand/uL    RBC 2 73 (L) 3 88 - 5 62 Million/uL    Hemoglobin 7 8 (L) 12 0 - 17 0 g/dL    Hematocrit 23 2 (L) 36 5 - 49 3 %    MCV 85 82 - 98 fL    MCH 28 6 26 8 - 34 3 pg    MCHC 33 6 31 4 - 37 4 g/dL    RDW 23 5 (H) 11 6 - 15 1 %    Platelets 923 927 - 667 Thousands/uL    MPV 10 1 8 9 - 12 7 fL   Basic metabolic panel    Collection Time: 01/02/20  6:09 AM   Result Value Ref Range    Sodium 137 136 - 145 mmol/L    Potassium 4 8 3 5 - 5 3 mmol/L    Chloride 106 100 - 108 mmol/L    CO2 20 (L) 21 - 32 mmol/L    ANION GAP 11 4 - 13 mmol/L    BUN 17 5 - 25 mg/dL    Creatinine 2 48 (H) 0 60 - 1 30 mg/dL    Glucose 129 65 - 140 mg/dL    Calcium 8 5 8 3 - 10 1 mg/dL    eGFR 34 ml/min/1 73sq m   Calcium, ionized    Collection Time: 01/02/20  6:09 AM   Result Value Ref Range    Calcium, Ionized 1 11 (L) 1 12 - 1 32 mmol/L   Magnesium    Collection Time: 01/02/20  6:09 AM   Result Value Ref Range    Magnesium 2 0 1 6 - 2 6 mg/dL   Phosphorus    Collection Time: 01/02/20  6:09 AM   Result Value Ref Range    Phosphorus 4 2 2 7 - 4 5 mg/dL         Ct Chest Abdomen Pelvis Wo Contrast    Result Date: 12/29/2019  Narrative: CT CHEST, ABDOMEN AND PELVIS WITHOUT IV CONTRAST INDICATION:   Shortness of breath  Weakness and fatigue  History of hepatoblastoma COMPARISON:  CT head, same day, CT chest dated 10/15/2013 TECHNIQUE: CT examination of the chest, abdomen and pelvis was performed without intravenous contrast   Axial, sagittal, and coronal 2D reformatted images were created from the source data and submitted for interpretation  Radiation dose length product (DLP) for this visit:  972 mGy-cm     This examination, like all CT scans performed in the [facility], was performed utilizing techniques to minimize radiation dose exposure, including the use of iterative reconstruction and automated exposure control  Enteric contrast was not administered  FINDINGS: CHEST LUNGS:  6 mm nodule in the right upper lobe (axial image 15, series 2), 3 mm nodule in the lateral right upper lobe (axial image 16, series 2), 4 mm nodule in the medial right apex (axial image 10, series 2)  Elevation of the right hemidiaphragm with a linear opacity in the right lower lung fields, probably atelectasis  Superimposed infection could be considered in the appropriate clinical setting  Lungs otherwise appear grossly clear  PLEURA:  Unremarkable  HEART/GREAT VESSELS:  Grossly unremarkable MEDIASTINUM AND AMOR:  Unremarkable  CHEST WALL AND LOWER NECK:   Unremarkable  ABDOMEN LIVER/BILIARY TREE:  The liver is diffusely enlarged and heterogeneous with innumerable focal low-density lesions seen seen throughout the liver, largest in the left hepatic lobe measures approximately 3 5 cm in size, largest in the right hepatic lobe measures approximately 6 cm in size  GALLBLADDER:  Not well seen or evaluated  SPLEEN:  Mildly enlarged  Splenic volume estimated at 650 mL  PANCREAS:  Unremarkable  ADRENAL GLANDS:  Unremarkable  KIDNEYS/URETERS:  Unremarkable  No hydronephrosis  STOMACH AND BOWEL:  There are multiple moderately dilated loops of small bowel seen in the mid and lower abdomen, largest measures approximately 4 8 cm in diameter  Some fecal appearing material seen within multiple small bowel loops  There is also relative underdistention of the distal small bowel and the colon although no discrete transition point is identified  Findings taken together are suspicious for small bowel obstruction  APPENDIX:  Normal caliber appendix  ABDOMINOPELVIC CAVITY:  Small amount of fluid is seen, probably reactive  No discrete intraperitoneal free air  Multiple postsurgical clips are seen in the upper and mid abdomen  No discrete bulky adenopathy is seen   VESSELS: No aortic aneurysm  PELVIS REPRODUCTIVE ORGANS:  Unremarkable for patient's age  URINARY BLADDER:  Partially distended bladder  Mild circumferential bladder wall thickening noted, probably exaggerated by underdistention  ABDOMINAL WALL/INGUINAL REGIONS:  Body wall edema OSSEOUS STRUCTURES:  No acute fracture or destructive osseous lesion  Impression: Diffusely enlarged liver with innumerable hepatic lesions, correlates with the patient's history of hepatoblastoma  Multiple moderately dilated loops of small bowel seen in the mid and lower abdomen, largest measures approximately 4 8 cm in diameter  Some fecal appearing material seen within multiple small bowel loops  There is also relative underdistention of the distal small bowel and the colon although no discrete transition point is identified  Findings taken together are suspicious for small bowel obstruction  Small amount of fluid in the peritoneal cavity, probably reactive  Partially distended bladder  Mild circumferential bladder wall thickening noted, probably exaggerated by underdistention  Superimposed cystitis could be considered in the appropriate clinical setting  Multiple subcentimeter pulmonary nodules as described, pulmonary metastatic disease is the diagnosis of exclusion  Mild splenomegaly, body wall edema, and other findings as above  Findings discussed with Dr Martina Parker by Dr Vidal Saunders at 2:15am on 12/29/2019 Workstation performed: [id number]     Ct Head Without Contrast    Result Date: 12/29/2019  Narrative: CT BRAIN - WITHOUT CONTRAST INDICATION:   Altered mental status  COMPARISON:  None  TECHNIQUE:  CT examination of the brain was performed  In addition to axial images, coronal 2D reformatted images were created and submitted for interpretation  Radiation dose length product (DLP) for this visit:  891 mGy-cm     This examination, like all CT scans performed in the [facility], was performed utilizing techniques to minimize radiation dose exposure, including the use of iterative reconstruction and automated exposure control  IMAGE QUALITY:  Diagnostic  FINDINGS: PARENCHYMA:  No intracranial mass, mass effect or midline shift  No CT signs of acute territorial infarction  No acute parenchymal hemorrhage  Gray-white differentiation appears maintained  VENTRICLES AND EXTRA-AXIAL SPACES:  Normal for the patient's age  VISUALIZED ORBITS AND PARANASAL SINUSES:  Mild mucosal thickening in the bilateral maxillary sinuses  Paranasal sinuses otherwise are grossly clear  The orbits appear intact  CALVARIUM AND EXTRACRANIAL SOFT TISSUES:  Normal      Impression: No acute intracranial abnormality is seen  Other findings as above  Workstation performed: [id number]     Xr Chest Portable Icu    Result Date: 12/29/2019  Narrative: CHEST INDICATION:   HD catheter placement  COMPARISON:  CT from yesterday EXAM PERFORMED/VIEWS:  XR CHEST PORTABLE ICU Images: 2 FINDINGS:  Dialysis catheter has been placed via a right internal jugular approach  The tip overlies the right mainstem bronchus, consistent with a position in the high right atrium  No pneumothorax is evident Heart shadow is obscured by adjacent opacity  Again, the right diaphragm is elevated  This is similar to previous  The minor fissure is just above the right diaphragm  This would be indicative of volume loss in the right middle and lower lobe  This is similar to previous  Impression: No application after catheter placement  Position appropriate Workstation performed: [id number]     Us Abdomen Complete With Doppler    Result Date: 12/31/2019  Narrative: ABDOMEN ULTRASOUND, COMPLETE WITH DOPPLER INDICATION: Hepatocellular carcinoma  Acute liver failure  Evaluation for portal vein thrombosis  COMPARISON: CT 12/28/2019 TECHNIQUE:   Real-time ultrasound of the abdomen was performed with a curvilinear transducer with both volumetric sweeps and still imaging techniques  FINDINGS: PANCREAS: Poorly visualized  AORTA AND IVC:  Visualized portions are normal for patient age  LIVER: Size: Enlarged  Contour:  Surface contour is smooth  Parenchyma: Numerous liver masses are again noted in keeping with patient's history of hepatocellular carcinoma  LIVER DOPPLER: The portal and hepatic veins are not identified and could be occluded or severely extrinsically compressed by liver masses  There is reversal of flow within the patent splenic and superior mesenteric veins  The hepatic artery is patent with normal arterial waveform on spectral Doppler imaging  BILIARY: Patient has undergone cholecystectomy  No intrahepatic biliary dilatation  CBD is not identified  KIDNEY: Right kidney measures 13 9 x 5 2 cm  Within normal limits  Left kidney measures 12 5 x 5 3 cm  Within normal limits  SPLEEN: Measures 15 9 x 5 7 x 16 0 cm  Within normal limits  ASCITES:  None  Impression: Enlarged liver containing numerous masses in keeping with provided history of HCC  Difficult Doppler evaluation due to marked liver abnormality  The portal and hepatic veins are not identified and could be occluded or severely extrinsically compressed by liver masses  There is reversal of flow within the patent splenic and superior mesenteric veins  The hepatic artery is patent with normal arterial waveform on spectral Doppler imaging  Splenomegaly  CBD is not visualized  No intrahepatic ductal dilation  No hydronephrosis   Workstation performed: [id number]     ECOG :4      Assessment and plan:  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, heavily pretreated with multiple surgical approach, TACE, clinical studies, he was on Ramucirumab at [address] the last dose on 12/19/2019 now with progression of disease by lab criteria, imaging studies, the patient ECOG score of 4, he is not alert, severe jaundice    Leukocytosis with abnormal differential and early premature cells and nucleated RBC, this might be bone marrow disorders from previous chemotherapy he received, leukemia, MDS, I talk to the father and the mother who is previous ICU nurse, we decided not to proceed with flow cytometry    Prognosis is very guarded    Emotional support was provided to the family

## 2020-01-02 NOTE — NUTRITION
If unable to safely advance PO diet in next 24 hrs then consider an alternate means of nutrition support and reconsult RD for recs  Monitor LFTs and renal parameters

## 2020-01-02 NOTE — PROGRESS NOTES
Progress Note- Herlinda Joseph 34 y o  male MRN: [id number]  Unit/Bed#: MICU 06 Encounter: [id number]    Assessment and Plan:  Martha Greene a 34 y o  old male with PMH: Hepatoblastoma dx in 2013, [facility] presented with acute liver failure       #Acute Liver Failure  #Hepatocellular Carcinoma  #Hepatoblastoma  MELD today: 40  Patient with some improvement in his transaminitis likely in the setting of improvement in shock liver given vasopressive support  Patient had negative acute hepatitis panel  Patient with still poor prognosis in the setting of significant hepatocellular carcinoma involvement and septic shock  Spoke with patient's gastroenterologist Dr Lainey Hawkins from [facility] who was in agreement that at this point would not consider pulsed dose steroids or any further escalation of care except supportive care as currently done by primary team   Agree with his assessment and related plans to patient's mother and family       Plan:  -consideration for pulse dose steroids? Would defer at this time given septic shock picture  -pending EBV, CMV, HSV   -pending anti smooth muscle antibody, antimitochondrial antibody  -TC positive, reflex titer pending  -continue [facility]- [facility]               -initial loading dose of 150 mg/kg/hour over 1 hour                -followed by 12 5 mg/kg/hour for 4 hours               -then continuous infusion of 6 25 mg/kg for 67 hours     #Transaminitis   #Shock Liver  Patient with acutely elevated LFTs in setting of shock likely causing shock liver which is compounding his already tenuous liver function  Transaminases continue to improve which is suggestive of underlying ischemic event   Patient now on 4 vasopressive medications and it is possible he will continue to have ischemic insults    -qdaily LFTs  -support patient's blood pressure     #Hepatic Encephalopathy  Avoid PO lactulose in setting of possible SBO  -lactulose via enema (350ml tap water mixed with 150ml of Lactulose given ND q6-8hr)       ______________________________________________________________________    Subjective:      Intubated, sedated    Medication Administration - last 24 hours from 01/01/2020 0736 to 01/02/2020 0736       Date/Time Order Dose Route Action Action by     01/02/2020 0436 dexmedetomidine (PRECEDEX) 400 mcg in sodium chloride 0 9 % 100 mL infusion 0 7 mcg/kg/hr Intravenous [address] [facility]     01/01/2020 2214 dexmedetomidine (PRECEDEX) 400 mcg in sodium chloride 0 9 % 100 mL infusion 0 7 mcg/kg/hr Intravenous [address] [facility]     01/01/2020 1015 dexmedetomidine (PRECEDEX) 400 mcg in sodium chloride 0 9 % 100 mL infusion 0 7 mcg/kg/hr Intravenous New Bag John Singletary RN     01/01/2020 1013 dexmedetomidine (PRECEDEX) 400 mcg in sodium chloride 0 9 % 100 mL infusion 0 mcg/kg/hr Intravenous Stopped John Singletary RN     01/02/2020 0728 vasopressin (PITRESSIN) 20 Units in sodium chloride 0 9 % 100 mL infusion 0 04 Units/min Intravenous 12 [facility], RN     01/02/2020 6084 vasopressin (PITRESSIN) 20 Units in sodium chloride 0 9 % 100 mL infusion 0 Units/min Intravenous Stopped [facility], RN     01/01/2020 2212 vasopressin (PITRESSIN) 20 Units in sodium chloride 0 9 % 100 mL infusion 0 04 Units/min Intravenous [address] [facility]     01/01/2020 1240 vasopressin (PITRESSIN) 20 Units in sodium chloride 0 9 % 100 mL infusion 0 04 Units/min Intravenous [address] John Singletary RN     01/01/2020 1239 vasopressin (PITRESSIN) 20 Units in sodium chloride 0 9 % 100 mL infusion 0 Units/min Intravenous Stopped John Singletary RN     01/02/2020 0452 ketamine 250 mg (STANDARD CONCENTRATION) IV in sodium chloride 0 9% 250 mL 0 2 mg/kg/hr Intravenous [address] [facility]     01/01/2020 1542 ketamine 250 mg (STANDARD CONCENTRATION) IV in sodium chloride 0 9% 250 mL 0 2 mg/kg/hr Intravenous [address] John Singletary RN     01/01/2020 1540 ketamine 250 mg (STANDARD CONCENTRATION) IV in sodium chloride 0 9% 250 mL 0 mg/kg/hr Intravenous Stopped Anders Jhaon, RN     01/02/2020 0140 sodium chloride (concentrated) 154 mEq in dextrose 10 % 1,000 mL infusion 100 mL/hr Intravenous [address] Sosa, RN     01/01/2020 1700 sodium chloride (concentrated) 154 mEq in dextrose 10 % 1,000 mL infusion 100 mL/hr Intravenous Rate/Dose Change Anders Jhaon, RN     01/01/2020 1446 sodium chloride (concentrated) 154 mEq in dextrose 10 % 1,000 mL infusion 50 mL/hr Intravenous [address] [facility] Anders Jhaon, RN     01/01/2020 1444 sodium chloride (concentrated) 154 mEq in dextrose 10 % 1,000 mL infusion 0 mL/hr Intravenous Stopped Anders Jhaon, RN     01/01/2020 2677 sodium chloride (concentrated) 154 mEq in dextrose 10 % 1,000 mL infusion 0 mL/hr Intravenous Hold Anders Jhaon, RN     01/02/2020 0544 phenylephrine (RYNE-SYNEPHRINE) 50 mg (STANDARD CONCENTRATION) in sodium chloride 0 9% 250 mL 180 mcg/min Intravenous [address]     01/02/2020 0240 phenylephrine (RYNE-SYNEPHRINE) 50 mg (STANDARD CONCENTRATION) in sodium chloride 0 9% 250 mL 180 mcg/min Intravenous Rate/Dose Change Sosa, RN     01/02/2020 0225 phenylephrine (RYNE-SYNEPHRINE) 50 mg (STANDARD CONCENTRATION) in sodium chloride 0 9% 250 mL 170 mcg/min Intravenous Rate/Dose Change Sosa, RN     01/02/2020 0215 phenylephrine (RYNE-SYNEPHRINE) 50 mg (STANDARD CONCENTRATION) in sodium chloride 0 9% 250 mL 160 mcg/min Intravenous Rate/Dose Change Sosa, RN     01/02/2020 0200 phenylephrine (RYNE-SYNEPHRINE) 50 mg (STANDARD CONCENTRATION) in sodium chloride 0 9% 250 mL 160 mcg/min Intravenous Rate/Dose Change Sosa, RN     01/02/2020 0143 phenylephrine (RYNE-SYNEPHRINE) 50 mg (STANDARD CONCENTRATION) in sodium chloride 0 9% 250 mL 150 mcg/min Intravenous Rate/Dose Change Sosa, RN     01/02/2020 0133 phenylephrine (RYNE-SYNEPHRINE) 50 mg (STANDARD CONCENTRATION) in sodium chloride 0 9% 250 mL 140 mcg/min Intravenous New Bag Ivelissea Jaona, RN     01/02/2020 0117 phenylephrine (RYNE-SYNEPHRINE) 50 mg (STANDARD CONCENTRATION) in sodium chloride 0 9% 250 mL 120 mcg/min Intravenous Rate/Dose Change Dorandreea Joana, RN     01/02/2020 0050 phenylephrine (RYNE-SYNEPHRINE) 50 mg (STANDARD CONCENTRATION) in sodium chloride 0 9% 250 mL 160 mcg/min Intravenous Rate/Dose Change Dorgenie Bernard, RN     01/02/2020 0006 phenylephrine (RYNE-SYNEPHRINE) 50 mg (STANDARD CONCENTRATION) in sodium chloride 0 9% 250 mL 140 mcg/min Intravenous Canceled Entry Sage Bernard, RN     01/01/2020 2308 phenylephrine (RYNE-SYNEPHRINE) 50 mg (STANDARD CONCENTRATION) in sodium chloride 0 9% 250 mL 150 mcg/min Intravenous Rate/Dose Change Sage Bernard, RN     01/01/2020 2251 phenylephrine (RYNE-SYNEPHRINE) 50 mg (STANDARD CONCENTRATION) in sodium chloride 0 9% 250 mL 140 mcg/min Intravenous Rate/Dose Change Ernestogenie Bernard, RN     01/01/2020 2034 phenylephrine (RYNE-SYNEPHRINE) 50 mg (STANDARD CONCENTRATION) in sodium chloride 0 9% 250 mL 130 mcg/min Intravenous Rate/Dose Change Sage Bernard, RN     01/01/2020 2009 phenylephrine (RYNE-SYNEPHRINE) 50 mg (STANDARD CONCENTRATION) in sodium chloride 0 9% 250 mL 140 mcg/min Intravenous Rate/Dose Change Sage Bernard, RN     01/01/2020 1906 phenylephrine (RYNE-SYNEPHRINE) 50 mg (STANDARD CONCENTRATION) in sodium chloride 0 9% 250 mL 150 mcg/min Intravenous [address] Ace Kins, RN     01/01/2020 1715 phenylephrine (RYNE-SYNEPHRINE) 50 mg (STANDARD CONCENTRATION) in sodium chloride 0 9% 250 mL 160 mcg/min Intravenous Rate/Dose Change Ace Kins, RN     01/01/2020 1343 phenylephrine (RYNE-SYNEPHRINE) 50 mg (STANDARD CONCENTRATION) in sodium chloride 0 9% 250 mL 170 mcg/min Intravenous [address] Ace Kins, RN     01/01/2020 1342 phenylephrine (RYNE-SYNEPHRINE) 50 mg (STANDARD CONCENTRATION) in sodium chloride 0 9% 250 mL 0 mcg/min Intravenous Stopped Ace Kins, RN     01/01/2020 1148 phenylephrine (RYNE-SYNEPHRINE) 50 mg (STANDARD CONCENTRATION) in sodium chloride 0 9% 250 mL 170 mcg/min Intravenous Rate/Dose Change Angelo An RN     01/01/2020 0901 phenylephrine (RYNE-SYNEPHRINE) 50 mg (STANDARD CONCENTRATION) in sodium chloride 0 9% 250 mL 180 mcg/min Intravenous [address] Angelo An RN     01/01/2020 0900 phenylephrine (RYNE-SYNEPHRINE) 50 mg (STANDARD CONCENTRATION) in sodium chloride 0 9% 250 mL 0 mcg/min Intravenous Stopped Angelo An RN     01/02/2020 0500 ascorbic acid 1,500 mg in sodium chloride 0 9 % 100 mL IVPB 0 mg Intravenous Stopped Hurtis Councilman, RN     01/02/2020 3160 ascorbic acid 1,500 mg in sodium chloride 0 9 % 100 mL IVPB 1,500 mg Intravenous [address] [facility]     01/01/2020 2330 ascorbic acid 1,500 mg in sodium chloride 0 9 % 100 mL IVPB 0 mg Intravenous Stopped Hurtis Councilman, RN     01/01/2020 2250 ascorbic acid 1,500 mg in sodium chloride 0 9 % 100 mL IVPB 1,500 mg Intravenous [address] [facility]     01/01/2020 1930 ascorbic acid 1,500 mg in sodium chloride 0 9 % 100 mL IVPB 0 mg Intravenous Stopped Hurtis Councilman, RN     01/01/2020 1859 ascorbic acid 1,500 mg in sodium chloride 0 9 % 100 mL IVPB 1,500 mg Intravenous [address] [facility] [address] Angelo An RN     01/01/2020 1210 ascorbic acid 1,500 mg in sodium chloride 0 9 % 100 mL IVPB 1,500 mg Intravenous New Bag Angelo An, PERRY     01/01/2020 2300 thiamine (VITAMIN B1) 200 mg in sodium chloride 0 9 % 50 mL IVPB 0 mg Intravenous Stopped Hurtis Councilman, RN     01/01/2020 2212 thiamine (VITAMIN B1) 200 mg in sodium chloride 0 9 % 50 mL IVPB 200 mg Intravenous [address] Hurtis Councilman, RN     01/01/2020 1200 thiamine (VITAMIN B1) 200 mg in sodium chloride 0 9 % 50 mL IVPB 0 mg Intravenous Stopped Angelo An RN     01/01/2020 1108 thiamine (VITAMIN B1) 200 mg in sodium chloride 0 9 % 50 mL IVPB 200 mg Intravenous [address] Angelo An RN     01/02/2020 0303 hydrocortisone sodium succinate (PF) (Solu-CORTEF) injection 50 mg 50 mg Intravenous Given Hurtis Councilman, RN 01/01/2020 2018 hydrocortisone sodium succinate (PF) (Solu-CORTEF) injection 50 mg 50 mg Intravenous Given Radha Westbrook RN     01/01/2020 1552 hydrocortisone sodium succinate (PF) (Solu-CORTEF) injection 50 mg 50 mg Intravenous Given Sandro Villalobos RN     01/01/2020 9326 hydrocortisone sodium succinate (PF) (Solu-CORTEF) injection 50 mg 50 mg Intravenous Given Sandro Villalobos RN     01/01/2020 0842 pantoprazole (PROTONIX) injection 40 mg 40 mg Intravenous Given Sandro Villalobos RN     01/01/2020 1712 artificial tear (LUBRIFRESH P M ) ophthalmic ointment   Both Eyes Given Sandro Villalobos RN     01/01/2020 0841 artificial tear (LUBRIFRESH P M ) ophthalmic ointment   Both Eyes Given Sandro Villalobos RN     01/01/2020 1108 NxStage K 4/Ca 3 dialysis solution (RFP-401) 20,000 mL 0 mL Dialysis Stopped Sandro Villalobos RN     01/01/2020 1405 norepinephrine (LEVOPHED) 8 mg (DOUBLE CONCENTRATION) IV in sodium chloride 0 9% 250 mL 0 mcg/min Intravenous Stopped Sandro Villalobos RN     01/01/2020 0949 norepinephrine (LEVOPHED) 8 mg (DOUBLE CONCENTRATION) IV in sodium chloride 0 9% 250 mL 30 mcg/min Intravenous [address] Sandro Villalobos RN     01/01/2020 1580 norepinephrine (LEVOPHED) 8 mg (DOUBLE CONCENTRATION) IV in sodium chloride 0 9% 250 mL 0 mcg/min Intravenous Stopped Sandro Villalobos RN     01/01/2020 0837 ampicillin (OMNIPEN) 2,000 mg in sodium chloride 0 9 % 100 mL IVPB 0 mg Intravenous Stopped Sandro Villalobos RN     01/01/2020 1900 metroNIDAZOLE (FLAGYL) IVPB (premix) 500 mg 0 mg Intravenous Stopped Sandro Villalobos RN     01/01/2020 1812 metroNIDAZOLE (FLAGYL) IVPB (premix) 500 mg 500 mg Intravenous [address] Sandro Villalobos RN     01/01/2020 1000 metroNIDAZOLE (FLAGYL) IVPB (premix) 500 mg 0 mg Intravenous Stopped Sandro Villalobos RN     01/01/2020 4209 metroNIDAZOLE (FLAGYL) IVPB (premix) 500 mg 500 mg Intravenous [address] Sandro Villalobos RN     01/02/2020 9018 Ketamine HCl 50 mg 50 mg Intravenous Given Radha Westbrook RN 01/02/2020 0200 Ketamine HCl 50 mg 50 mg Intravenous Given Julio Jacobo, RN     01/01/2020 2347 Ketamine HCl 50 mg 50 mg Intravenous Given Julio Jacobo, RN     01/01/2020 1938 Ketamine HCl 50 mg 50 mg Intravenous Given Julio Jacobo, RN     01/02/2020 0410 ampicillin (OMNIPEN) 2,000 mg in sodium chloride 0 9 % 100 mL IVPB 0 mg Intravenous Stopped Julio Jacobo, RN     01/02/2020 5556 ampicillin (OMNIPEN) 2,000 mg in sodium chloride 0 9 % 100 mL IVPB 2,000 mg Intravenous New Bag Julio Jacobo, RN     01/01/2020 2150 ampicillin (OMNIPEN) 2,000 mg in sodium chloride 0 9 % 100 mL IVPB 0 mg Intravenous Stopped Julio Jacobo, RN     01/01/2020 2114 ampicillin (OMNIPEN) 2,000 mg in sodium chloride 0 9 % 100 mL IVPB 2,000 mg Intravenous [address] Julio Jacobo, RN     01/01/2020 1800 ampicillin (OMNIPEN) 2,000 mg in sodium chloride 0 9 % 100 mL IVPB 0 mg Intravenous Stopped Lupe Carpior, RN     01/01/2020 1708 ampicillin (OMNIPEN) 2,000 mg in sodium chloride 0 9 % 100 mL IVPB 2,000 mg Intravenous New Bag Lupe Maker, RN     01/01/2020 1100 ampicillin (OMNIPEN) 2,000 mg in sodium chloride 0 9 % 100 mL IVPB 0 mg Intravenous Stopped Lupe Maker, RN     01/01/2020 1014 ampicillin (OMNIPEN) 2,000 mg in sodium chloride 0 9 % 100 mL IVPB 2,000 mg Intravenous [address] Lupe Maker, RN     01/01/2020 1735 acetylcysteine (ACETADOTE) 580 mg in dextrose 5 % 1,000 mL IVPB 0 mg Intravenous Stopped Lupe Makemauricio, RN     01/01/2020 2336 fentaNYL 1000 mcg in sodium chloride 0 9% 100mL infusion 100 mcg/hr Intravenous [address] Julio Jacobo, [facility]     01/01/2020 1555 fentaNYL 1000 mcg in sodium chloride 0 9% 100mL infusion 100 mcg/hr Intravenous [address] Lupe Maker, RN     01/01/2020 1554 fentaNYL 1000 mcg in sodium chloride 0 9% 100mL infusion 0 mcg/hr Intravenous Stopped Lupe Jaramillo RN     01/02/2020 0617 EPINEPHrine 3000 mcg (STANDARD CONCENTRATION) IV in sodium chloride 0 9% 250 mL 2 mcg/min Intravenous Rate/Dose Change Keven Martina Oakes, RN     01/02/2020 4727 EPINEPHrine 3000 mcg (STANDARD CONCENTRATION) IV in sodium chloride 0 9% 250 mL 3 mcg/min Intravenous Rate/Dose Change Serge Archer RN     01/02/2020 0553 EPINEPHrine 3000 mcg (STANDARD CONCENTRATION) IV in sodium chloride 0 9% 250 mL 4 mcg/min Intravenous Canceled Entry Serge Archer RN     01/02/2020 7881 EPINEPHrine 3000 mcg (STANDARD CONCENTRATION) IV in sodium chloride 0 9% 250 mL 4 mcg/min Intravenous Rate/Dose Change Serge Archer RN     01/02/2020 7492 EPINEPHrine 3000 mcg (STANDARD CONCENTRATION) IV in sodium chloride 0 9% 250 mL 5 mcg/min Intravenous Rate/Dose Change Serge Archer RN     01/02/2020 0300 EPINEPHrine 3000 mcg (STANDARD CONCENTRATION) IV in sodium chloride 0 9% 250 mL 6 mcg/min Intravenous Rate/Dose Change Serge Archer RN     01/02/2020 0250 EPINEPHrine 3000 mcg (STANDARD CONCENTRATION) IV in sodium chloride 0 9% 250 mL 5 mcg/min Intravenous Rate/Dose Change Serge Archer RN     01/02/2020 0240 EPINEPHrine 3000 mcg (STANDARD CONCENTRATION) IV in sodium chloride 0 9% 250 mL 4 mcg/min Intravenous Rate/Dose Change Serge Archer RN     01/02/2020 0230 EPINEPHrine 3000 mcg (STANDARD CONCENTRATION) IV in sodium chloride 0 9% 250 mL 3 mcg/min Intravenous Rate/Dose Change Serge Archer RN     01/02/2020 0220 EPINEPHrine 3000 mcg (STANDARD CONCENTRATION) IV in sodium chloride 0 9% 250 mL 2 mcg/min Intravenous Restarted Serge Archer RN     01/01/2020 1030 EPINEPHrine 3000 mcg (STANDARD CONCENTRATION) IV in sodium chloride 0 9% 250 mL 0 mcg/min Intravenous Hold Sadiq Baron RN     01/02/2020 0358 fentanyl citrate (PF) 100 MCG/2ML 50 mcg 50 mcg Intravenous Given Shawn Root RN     01/02/2020 0233 fentanyl citrate (PF) 100 MCG/2ML 50 mcg 50 mcg Intravenous Given Serge Archer RN     01/02/2020 0019 fentanyl citrate (PF) 100 MCG/2ML 50 mcg 50 mcg Intravenous Given Serge Archer RN     01/01/2020 2013 fentanyl citrate (PF) 100 MCG/2ML 50 mcg 50 mcg Intravenous Not Given Radha Westbrook RN     01/01/2020 1654 fentanyl citrate (PF) 100 MCG/2ML 50 mcg 50 mcg Intravenous Given Sandro Villalobos RN     01/01/2020 1415 fentanyl citrate (PF) 100 MCG/2ML 50 mcg 50 mcg Intravenous Given Sandro Villalobos RN     01/01/2020 1154 fentanyl citrate (PF) 100 MCG/2ML 50 mcg 50 mcg Intravenous Given Sandro Villalobos RN     01/01/2020 0843 fentanyl citrate (PF) 100 MCG/2ML 50 mcg 50 mcg Intravenous Given Sandro Villalobos RN     01/01/2020 0800 calcium gluconate 2 g in sodium chloride 0 9% 100 mL (premix) 0 g Intravenous Stopped Sandro Villalobos RN     01/01/2020 1542 NxStage K 4/Ca 3 dialysis solution (RFP-401) 20,000 mL 0 mL Dialysis Stopped Sandro Villalobos RN     01/01/2020 1109 NxStage K 4/Ca 3 dialysis solution (RFP-401) 20,000 mL 20,000 mL Dialysis [address] Sandro Villalobos RN     01/02/2020 0416 norepinephrine (LEVOPHED) 8,000 mcg in dextrose 5 % 250 mL infusion 56 3 mL/hr Intravenous [address] Radha Westbrook, [address] [place] [address]     01/01/2020 2342 norepinephrine (LEVOPHED) 8,000 mcg in dextrose 5 % 250 mL infusion 56 3 mL/hr Intravenous [address] Radha Westbrook, [address] [place] [address]     01/01/2020 1902 norepinephrine (LEVOPHED) 8,000 mcg in dextrose 5 % 250 mL infusion 56 3 mL/hr Intravenous [address] Radha Westbrook, [address] [place] [address]     01/01/2020 1406 norepinephrine (LEVOPHED) 8,000 mcg in dextrose 5 % 250 mL infusion 56 3 mL/hr Intravenous [address] Sandro Villalobos RN     01/01/2020 1320 dextrose 50 % IV solution 25 mL 25 mL Intravenous Given Sandro Villalobos RN     01/02/2020 1683 NxStage K 2/Ca 3 dialysis solution (RFP-400) 20,000 mL 20,000 mL Dialysis [facility], RN     01/01/2020 2225 NxStage K 2/Ca 3 dialysis solution (RFP-400) 20,000 mL 20,000 mL [facility] Radha Westbrook, [facility]     01/01/2020 1543 NxStage K 2/Ca 3 dialysis solution (RFP-400) 20,000 mL 20,000 mL Dialysis New Bag Sandro Villalobos RN     01/01/2020 1500 calcium gluconate 1 g in sodium chloride 0 9% 50 mL (premix) 0 g Intravenous Stopped Sandro Villaolbos RN     01/01/2020 1350 calcium gluconate 1 g in sodium chloride 0 9% 50 mL (premix) 1 g Intravenous [address] Yazan Paul, PERRY     01/01/2020 2010 calcium gluconate 1 g in sodium chloride 0 9% 50 mL (premix) 0 g Intravenous Stopped Yahir Villagran, PERRY     01/01/2020 1938 calcium gluconate 1 g in sodium chloride 0 9% 50 mL (premix) 1 g Intravenous [address] Yahir Muñoz, [facility]     01/02/2020 0207 calcium gluconate 1 g in sodium chloride 0 9% 50 mL (premix) 0 g Intravenous Stopped Yahir Villagran RN     01/02/2020 1990 calcium gluconate 1 g in sodium chloride 0 9% 50 mL (premix) 1 g Intravenous [address] Yahir Tyshawn, [address] [place] [address]     01/02/2020 7348 Ketamine HCl 50 mg 50 mg Intravenous Given Yahir Villagran, PERRY     01/02/2020 7375 calcium gluconate 1 g in sodium chloride 0 9% 50 mL (premix) 1 g Intravenous New Bag Yahir Villagran, PERRY          Objective:     Vitals: Blood pressure (!) 72/43, pulse 98, temperature 98 2 °F (36 8 °C), resp  rate (!) 6, height 6' 6" (1 981 m), weight 95 4 kg (210 lb 5 1 oz), SpO2 98 %  ,Body mass index is 24 3 kg/m²  Intake/Output Summary (Last 24 hours) at 1/2/2020 0736  Last data filed at 1/2/2020 6884  Gross per 24 hour   Intake 7698 76 ml   Output 7386 ml   Net 312 76 ml       Physical Exam:   General Appearance:   Intubated, sedated   HEENT:    scleral icterus   Neck:  Supple, symmetrical, trachea midline   Lungs:   Clear to auscultation bilaterally; no rales, rhonchi or wheezing; respirations unlabored    Heart:   Regular rate and rhythm; no murmur, rub, or gallop     Abdomen:   Soft, non tender, previous surgical scar    Genitalia:   Deferred    Rectal:   Deferred    Extremities:  No cyanosis, clubbing or edema    Pulses:  2+ and symmetric all extremities    Skin:   jaundice   Lymph nodes:  No palpable cervical lymphadenopathy        Invasive Devices     Central Venous Catheter Line            CVC Central Lines 01/01/20 Triple 20cm less than 1 day          Peripheral Intravenous Line            Peripheral IV 12/31/19 Right; Upper Arm 1 day          Arterial Line            Arterial Line 12/29/19 Radial 4 days          Hemodialysis Catheter            HD Temporary Double Catheter 4 days          Drain            Urethral Catheter Temperature probe 4 days              Lab Results:  No results displayed because visit has over 200 results  Imaging Studies: I have personally reviewed pertinent imaging studies        ---------------------------------------------------  Note Electronically Signed By:    MD Sanna De León 73 Gastroenterology Fellow PGY-4  PI #: [id number]

## 2020-01-02 NOTE — PROGRESS NOTES
Progress Note - Infectious Disease   Nely Davis 34 y o  male MRN: [id number]  [facility] Encounter: [id number]      Impression/Recommendations:  1   Septic shock, POA   Hypothermia, tachycardia, profound leukocytosis, refractory hypotension   Due to # 2   No other appreciable source of infection   On appropriate antibiotics   Continued hemodynamic derangements, leukocytosis likely due to profound multiorgan system failure   Consider additional role of inflammatory reaction to recent chemotherapy  Remains critically ill on multiple pressors, CVVHD  Rec:  ? Continue ampicillin for now  ? Continue stress-dose steroids per primary (which should also have anti-inflammatory effect)  ? Follow temperatures closely  ? Recheck CBC in a m   ? Supportive care as per the primary service     2   Enterococcal bacteremia   Suspect GI versus biliary source in setting of progressive hepatocellular carcinoma   No piero abscess or biliary obstruction noted on CT   Consider endocarditis   No indwelling lines   Repeat blood cultures positive but with late growth and drawn after <36 hours of antibiotics  Additional set now negative times 24 hours  TTE negative but technically difficult  Rec:  ? Continue ampicillin for now  ? Follow-up repeat blood cultures     3   AZALIA   With hyperkalemia and acidosis   Likely multifactorial due to infection, profound dehydration   Consider also hepatorenal   Consider role of inflammatory reaction to recent chemotherapy  On CVVHD  Rec:  ? Dose adjust antibiotics for CVVHD  ? Close renal follow-up ongoing     4   Shock liver   Likely multifactorial due to infection, profound hypotension and dehydration   In the setting of # 8   Transaminases improving slightly since admission but remain quite elevated and total bili rising  Rec:  ? Supportive care as per the primary service  ?  Recheck CMP in a m      5   Acute encephalopathy   Likely multifactorial due to all of above   CT head negative   Remains encephalopathic  Rec:  ? Supportive care as per the primary service  ? Follow mental status closely     6   SBO   No NG tube placed due to coagulopathy   Conservative management ongoing per surgery      7   History of C  Diff   High risk for recurrence   On prophylactic IV Flagyl      8   Progressive hepatocellular carcinoma   Recent CT with progressive disease   On palliative Ramucircumab  Rec:  · Continue to readdress goals of care     The above impression and plan was discussed in detail with patient's parents at the bedside      Antibiotics:  Ampicillin #4  Antibiotics #6  IV Flagyl #5  Steroids #6    Subjective:  Patient seen on AM rounds  Unable to provide review of systems due to encephalopathy  Mother is at bedside and helps provide a history    24 Hour Events: Worsening hypoxia and hypotension overnight  Increased pressors and required high-flow nasal cannula  No documented fevers  WBC worsening  Transaminases improving but total bili remains high  Repeat blood cultures negative times 24 hours  Objective:  Vitals:  Temp:  [96 4 °F (35 8 °C)-99 °F (37 2 °C)] 97 5 °F (36 4 °C)  HR:  [] 98  Resp:  [5-22] 13  SpO2:  [74 %-100 %] 97 %  Temp (24hrs), Av 4 °F (36 3 °C), Min:96 4 °F (35 8 °C), Max:99 °F (37 2 °C)  Current: Temperature: 97 5 °F (36 4 °C)    Physical Exam:   General:  Encephalopathic grossly jaundiced  Eyes:  Normal lids, conjunctivae all edema, scleral icterus  ENT:  Normal external ears and nose  Neck:  Neck symmetric with midline trachea  Pulmonary:  Agonal breathing without accessory muscle use  Cardiovascular:  Tachycardic with a regular rhythm; mild generalized edema  Gastrointestinal:  No tenderness or distention  Musculoskeletal:  No digital clubbing or cyanosis  Skin:  No visible rashes or nodules  Neurologic:  Limited due to encephalopathy  Psychiatric:  Encephalopathic    Lab Results:  I have personally reviewed pertinent labs    Results from last 7 days   Lab Units 01/02/20  0609 01/02/20 0428 01/02/20 0408 01/01/20 2337 01/01/20  1811  01/01/20 0552  12/31/19 0436  12/28/19 2330 12/28/19  2145   POTASSIUM mmol/L 4 8  --   --  5 1 4 7   < > 4 8   < > 3 9   < >  --   --    CHLORIDE mmol/L 106  --   --  106 106   < > 107   < > 109*   < >  --   --    CO2 mmol/L 20*  --   --  21 20*   < > 17*   < > 20*   < >  --   --    CO2, I-STAT mmol/L  --   --  23  --   --   --   --   --   --   --  12* 12*   BUN mg/dL 17  --   --  16 16   < > 16   < > 19   < >  --   --    CREATININE mg/dL 2 48*  --   --  2 44* 2 15*   < > 2 44*   < > 2 71*   < >  --   --    EGFR ml/min/1 73sq m 34  --   --  34 40   < > 34   < > 30   < > 16 14   GLUCOSE, ISTAT mg/dl  --   --  125  --   --   --   --   --   --   --  95 40*   CALCIUM mg/dL 8 5  --   --  8 6 8 6   < > 9 1   < > 8 6   < >  --   --    AST U/L  --  2,423*  --   --   --   --  3,477*  --  5,445*   < >  --   --    ALT U/L  --  980*  --   --   --   --  1,274*  --  1,614*   < >  --   --    ALK PHOS U/L  --  1,038*  --   --   --   --  1,061*  --  1,151*   < >  --   --     < > = values in this interval not displayed  Results from last 7 days   Lab Units 01/02/20 0428 01/02/20 0408 01/01/20 0552 12/31/19  0436   WBC Thousand/uL 80 22*  --  75 44* 54 54*   HEMOGLOBIN g/dL 7 8*  --  7 9* 8 1*   I STAT HEMOGLOBIN g/dl  --  8 8*  --   --    PLATELETS Thousands/uL 182  --  335 466*     Results from last 7 days   Lab Units 01/01/20  1440 01/01/20  0442 12/30/19  0544 12/30/19  0534 12/28/19  2246 12/28/19  2230   BLOOD CULTURE  Received in Microbiology Lab  Culture in Progress  Received in Microbiology Lab  Culture in Progress   Enterococcus faecalis*  --  Enterococcus faecalis* Enterococcus faecalis*   GRAM STAIN RESULT   --   --  Gram positive cocci in chains* Gram positive cocci in pairs and chains* Gram positive cocci in pairs and chains* Gram positive cocci in pairs and chains*       Imaging Studies:   I have personally reviewed pertinent imaging study reports and images in PACS  EKG, Pathology, and Other Studies:   I have personally reviewed pertinent reports

## 2020-01-02 NOTE — PROGRESS NOTES
Daily Progress Note - Critical Care   Urban Wilda Davis 34 y o  male MRN: [id number]  Unit/Bed#: [facility] 06 Encounter: [id number]        ----------------------------------------------------------------------------------------  HPI/24hr events:   · Femoral TLC placed  · Cultures sent  · Pt with increased hemodynamic instability overnight with EPI restarted and titrated between 2-10 mcg  · Transient hypoxemia (SaO2 70%) with NRB transient  · AB //21  · PRN Ketamine and PRN fentanyl overnight  · TTE: EF 65%, no RWMA, RV nl, mild TR no pericardial effusion  · Resp Rate decreased   ---------------------------------------------------------------------------------------  SUBJECTIVE  Pt with acute encephalopathy, overnight events discussed with RN and pt's mom at this Bedside    Review of Systems   Unable to perform ROS: Mental status change     ---------------------------------------------------------------------------------------  Assessment and Plan:  Neuro:  Diagnosis: Hepatic encephalopathy, pain, suspected delirium, hyperammonemia  Plan:  · Management of PAD  · Analgesia  · Sedation plan: Fentanyl 100 mcg/min,  Ketamine 0 2 mcg/kg/hr and Precedex 0 7 mcg/kg/hr   · PRN Ketamine 50 mg x 4 doses/24 hours  · PRN Fentanyl 50 mcg x 9 doses/24 hours  · RASS goal:  -1 Drowsy and 0 Alert and Calm  · Delirium monitoring/management  · Regulate Sleep-wake cycle/CAM-ICU daily  · Serial Neuro Exams  Cardiac  Diagnosis: Septic Shock  Plan:  · Cardiac infusions: Epinephrine, 2 mcg/min, Levophed, 30 mcg/min, Neosynephrine, 180 mcg/min, Vasopressin, 0 04 Units/min  · Wean Epi as able  · MAP goal > 65  · Rhythm: Sinus Tachycardia  · Follow rhythm on telemetry  · Marick protocol to complete thiamine and folic acid today:Continue Solumedrol 50mg IV Q 6 hours  Pulmonary  Diagnosis: Acute Respiratory insufficiency  Plan:  · Chlorhexidine ordered: no  · Pulmonary toileting  · Wean FiO2 as able  · Obtain CXR today  Gastrointestinal  Diagnosis: SBO, Acute on Chronic Liver failure, 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, bile duct rxn, LND, JH, RYJ, multiple rounds of chemo, TACE x 2, Ramucirumad LD 12/17  Plan:  · Appreciate GI recommendations  · Completed NAC for ALISA-LACIE  · Serology pending: TC, anti smooth muscle antibody IgG, antimitochondrial antibody,   · Negative acute hepatitis panel  · Pending HSV, CMB, EBV  · Obtain bedside KUB today, concern for risk benefit of CT scan  FEN  Diagnosis: Protein caloric malnutrition secondary to lack of enteral access  Plan:  · Fluid/Diuretic plan: net even to positive  · Goal 24 hour fluid balance: CVVHD  · Nutrition/diet plan: NPO due to lack of enteral access  · Lytes replaced per CVVH protocol  Genitourinary  Diagnosis: AZALIA, multifactorial on CVVHD   Plan:  · Nephrology following  · Indwelling Heart present: yes   · Trend UOP and BUN/creat  · Strict I and O  · Avoid nephrotoxic agents    Infectious Diease  Diagnosis: Septic Shock, Enterococcal bacteremia, hx of Cdiff, increasing leukocytosis  Plan:  ID following  · Abx ordered: Ampicillin D#4, Flagyl D#5, abx day #6  · Repeat Cultures Pending from 1/1  · Trend temps and WBC count  Procalcitonin:  Results from last 7 days   Lab Units 12/30/19  0757   PROCALCITONIN ng/ml 36 01*       Heme:   Diagnosis: Anemia and coagulopathy  Plan:  · Trend hgb and plts  · Transfuse as needed for goal hgb >7  Endo:   Diagnosis: Hypoglycemia secondary to hepatic dysfunction  Plan:  · D 10 infusion BS 97-147mg/dL  MSK/Skin:  · Early Mobility/Exercise  · PT consult: not applicable  · OT consult: not applicable  · Turn and position patient Q2 hours  · Off load pressure points  · Allevyn preventative per protocol    Family:  · Family updated within 24 hours: yes, pt's mom at bedside    Disposition: Continue ICU care  Code Status: Level 3 - DNAR and DNI  ---------------------------------------------------------------------------------------  ICU CORE MEASURES    Prophylaxis   VTE Pharmacologic Prophylaxis: Pharmacologic VTE Prophylaxis contraindicated due to Coagulopathy  VTE Mechanical Prophylaxis: sequential compression device  Stress Ulcer Prophylaxis: Pantoprazole IV     ABCDE Protocol (if indicated)  Plan to perform spontaneous awakening trial today? Not applicable  Plan to perform spontaneous breathing trial today? Not applicable  Obvious barriers to extubation? Not applicable  CAM-ICU: EMMANUEL due to ecephalopathy    Invasive Devices Review  Invasive Devices     Central Venous Catheter Line            CVC Central Lines 01/01/20 Triple 20cm less than 1 day          Peripheral Intravenous Line            Peripheral IV 12/31/19 Right;Upper Arm 1 day          Arterial Line            Arterial Line 12/29/19 Radial 4 days          Hemodialysis Catheter            HD Temporary Double Catheter 4 days          Drain            Urethral Catheter Temperature probe 4 days              Can any invasive devices be discontinued today? No (provide explanation): IV access and CVVHD  ---------------------------------------------------------------------------------------  OBJECTIVE    Physical Exam   Constitutional: He appears lethargic  He appears cachectic  Nasal cannula in place  Temporal wasting noted   HENT:   Mouth/Throat: Uvula is midline  Mucous membranes are dry  Eyes: Right eye exhibits chemosis  Right eye exhibits no exudate  Left eye exhibits chemosis  Left eye exhibits no exudate  Scleral icterus is present  Pupils 2 mm sluggish reactive   Neck: No JVD present  Cardiovascular: Normal rate, regular rhythm and normal heart sounds  Pulses:       Radial pulses are 1+ on the right side, and 1+ on the left side  Dorsalis pedis pulses are 1+ on the right side, and 1+ on the left side  Pulmonary/Chest: Bradypnea noted   He has decreased breath sounds in the right lower field and the left lower field  He has rhonchi in the left middle field  Abdominal: Normal appearance  Bowel sounds are decreased  Neurological: He appears lethargic  GCS eye subscore is 4  GCS verbal subscore is 2  GCS motor subscore is 4  Skin: Skin is warm and dry  Capillary refill takes less than 2 seconds  No cyanosis  Nails show no clubbing  Jaundice  Edema 1+-2+ pitting edea       Vitals   Vitals:    20 0500 20 0547 20 0600 20 0700   BP:       BP Location:       Pulse: 104  102 98   Resp: (!) 8  (!) 8 (!) 6   Temp: 98 2 °F (36 8 °C)      TempSrc:       SpO2: 98%  99% 98%   Weight:  95 4 kg (210 lb 5 1 oz)     Height:         Temp (24hrs), Av 3 °F (36 3 °C), Min:96 4 °F (35 8 °C), Max:99 °F (37 2 °C)  Current: Temperature: 98 2 °F (36 8 °C)  /56 (72)    Respiratory:  O2 Flow Rate (L/min): 2 L/min    Height and Weights   Height: 6' 6" (198 1 cm)  IBW: 91 4 kg  Body mass index is 24 3 kg/m²  Weight (last 2 days)     Date/Time   Weight    20 0547   95 4 (210 32)    20 0530   95 8 (211 2)    19 0843   94 3 (207 89)    19 0557   94 3 (207 89)              Intake and Output  I/O        07 -  0700  07 -  0700    I V  (mL/kg) 5453 2 (56 9) 5283 1 (55 4)    IV Piggyback 3910 2 2415 7    Total Intake(mL/kg) 9363 4 (97 7) 7698 8 (80 7)    Urine (mL/kg/hr) 5 (0) 15 (0)    Other 7362 7371    Total Output 7367 7356    Net + 4 +312 8                Nutrition       Diet Orders   (From admission, onward)             Start     Ordered    19 0212  Diet NPO  Diet effective now     Question Answer Comment   Diet Type NPO    RD to adjust diet per protocol?  Yes        19 0213                  Laboratory and Diagnostics:  Results from last 7 days   Lab Units 20  0428 20  0408 20  0552 19  0436 19  0532 19  1005 19  0608 19  0422  19  2144   WBC Thousand/uL 80 22*  --  75 44* 54 54* 45 98*  --  46 36*  --   --  43 88*   HEMOGLOBIN g/dL 7 8*  --  7 9* 8 1* 8 1* 8 6* 8 6*  --   --  10 2*   I STAT HEMOGLOBIN g/dl  --  8 8*  --   --   --   --   --   --    < >  --    HEMATOCRIT % 23 2*  --  23 7* 23 8* 23 5* 25 2* 26 2*  --   --  32 6*   HEMATOCRIT, ISTAT %  --  26*  --   --   --   --   --   --    < >  --    PLATELETS Thousands/uL 182  --  335 466* 567*  --  667* 656*  --  811*   BANDS PCT %  --   --  3  --   --   --   --   --   --  10*   MONO PCT %  --   --  8 6 3*  --  9  --   --  7    < > = values in this interval not displayed       Results from last 7 days   Lab Units 01/02/20  0609 01/02/20  0428 01/02/20  0408 01/01/20  2337 01/01/20  1811 01/01/20  1210 01/01/20 2012 01/01/20  0005 12/31/19  1738  12/31/19  0436  12/30/19  0532  12/29/19  1802  12/29/19  0222   SODIUM mmol/L 137  --   --  139 136 137 138 141 141   < > 139   < > 136  136   < > 135*   < > 126*   POTASSIUM mmol/L 4 8  --   --  5 1 4 7 4 9 4 8 4 8 4 4   < > 3 9   < > 4 1  4 1   < > 4 6   < > 6 7*   CHLORIDE mmol/L 106  --   --  106 106 107 107 107 110*   < > 109*   < > 105  105   < > 102   < > 94*   CO2 mmol/L 20*  --   --  21 20* 20* 17* 17* 19*   < > 20*   < > 18*  17*   < > 15*   < > 12*   CO2, I-STAT mmol/L  --   --  23  --   --   --   --   --   --   --   --   --   --   --   --   --   --    ANION GAP mmol/L 11  --   --  12 10 10 14* 17* 12   < > 10   < > 13  14*   < > 18*   < > 20*   BUN mg/dL 17  --   --  16 16 16 16 18 19   < > 19   < > 24  25   < > 32*   < > 53*   CREATININE mg/dL 2 48*  --   --  2 44* 2 15* 2 25* 2 44* 2 45* 2 39*   < > 2 71*   < > 2 98*  2 98*   < > 3 24*   < > 4 56*   CALCIUM mg/dL 8 5  --   --  8 6 8 6 8 9 9 1 8 1* 8 4   < > 8 6   < > 8 4  8 4   < > 7 7*   < > 6 7*   GLUCOSE RANDOM mg/dL 129  --   --  118 97 57* 79 79 97   < > 88   < > 75  78   < > 77   < > 39*   ALT U/L  --  980*  --   --   --   --  1,274*  --   --   --  1,614*  --  2,005*  --  2,246*  --  2,338* AST U/L  --  2,423*  --   --   --   --  3,477*  --   --   --  5,445*  --  8,433*  --  11,037*  --  13,166*   ALK PHOS U/L  --  1,038*  --   --   --   --  1,061*  --   --   --  1,151*  --  1,205*  --  1,411*  --  1,521*   ALBUMIN g/dL  --  1 9*  --   --   --   --  2 1*  --   --   --  1 8*  --  1 9*  --  2 0*  --  2 1*   TOTAL BILIRUBIN mg/dL  --  28 45*  --   --   --   --  29 08*  --   --   --  26 05*  --  26 74*  --  26 85*  --  32 51*    < > = values in this interval not displayed  Results from last 7 days   Lab Units 01/02/20  0609 01/01/20  2337 01/01/20  1809 01/01/20  1210 01/01/20  0552 01/01/20  0005 12/31/19  1738   MAGNESIUM mg/dL 2 0 2 0 2 0 2 0 2 2 1 8 2 0   PHOSPHORUS mg/dL 4 2 3 7 3 9 3 9 3 8 3 5 3 3      Results from last 7 days   Lab Units 01/02/20  0428 01/01/20  0552 12/31/19  0436 12/30/19  0532 12/29/19  1215 12/29/19  0422 12/28/19  2144   INR  4 56* 4 82* 6 60* 10 30* >15 00* >15 00*  --    PTT seconds  --   --   --   --  85* 87* 83*        Results from last 7 days   Lab Units 01/01/20  0920 12/29/19  2052 12/29/19  1802 12/29/19  1526 12/29/19  1214 12/29/19  0423 12/29/19  0008   LACTIC ACID mmol/L 6 7* 7 7* 7 5* 7 5* 7 3* 10 1* 8 4*       Results from last 7 days   Lab Units 12/30/19  0757   PROCALCITONIN ng/ml 36 01*       Micro  Results from last 7 days   Lab Units 01/01/20  1440 01/01/20  0442 12/30/19  0544 12/30/19  0534 12/28/19  2246 12/28/19  2230   BLOOD CULTURE  Received in Microbiology Lab  Culture in Progress  Received in Microbiology Lab  Culture in Progress   Enterococcus faecalis*  --  Enterococcus faecalis* Enterococcus faecalis*   GRAM STAIN RESULT   --   --  Gram positive cocci in chains* Gram positive cocci in pairs and chains* Gram positive cocci in pairs and chains* Gram positive cocci in pairs and chains*     Ammonia 57  EKG: ST on tele  Imaging: none in past 24 hours    Active Medications  Scheduled Meds:    Current Facility-Administered Medications:  ampicillin 2,000 mg Intravenous Q6H Stevie Terrell MD Last Rate: Stopped (01/02/20 0410)   artificial tear  Both Eyes BID Radha Veronica PA-C    ascorbic acid in sodium chloride 0 9% 100 mL IVPB 1,500 mg Intravenous Q6H Cinthya Greene PA-C Last Rate: Stopped (01/02/20 0500)   dexmedetomidine 0 1-0 7 mcg/kg/hr Intravenous Titrated Jeniffer Garcia MD Last Rate: 0 7 mcg/kg/hr (01/02/20 0436)   epinephrine 1-10 mcg/min Intravenous Titrated Jeniffer Garcia MD Last Rate: 2 mcg/min (01/02/20 0617)   fentaNYL 100 mcg/hr Intravenous Continuous Jeniffer Garcia MD Last Rate: 100 mcg/hr (01/01/20 2336)   fentanyl citrate (PF) 50 mcg Intravenous Q1H PRN Jeniffer Garcia MD    hydrocortisone sodium succinate 50 mg Intravenous Q6H Aamir Veronica PA-C    ketamine 0 2 mg/kg/hr Intravenous Continuous Josesito Garcia MD Last Rate: 0 2 mg/kg/hr (01/02/20 0452)   Ketamine HCl 50 mg Intravenous Q2H PRN Maxwell Guerra MD    metroNIDAZOLE 500 mg Intravenous BID Maxwell Guerra MD Last Rate: Stopped (01/01/20 1900)   IV infusion builder 56 3 mL/hr Intravenous Titrated SHANA Macedo Last Rate: 56 3 mL/hr (01/02/20 0416)   NxStage K 2/Ca 3 20,000 mL Dialysis Continuous Tiffany K Rubi, DO    pantoprazole 40 mg Intravenous Q24H [address] Aamir Veronica PA-C    phenylephine  mcg/min Intravenous Titrated Radha Veronica PA-C Last Rate: 180 mcg/min (01/02/20 0544)   dextrose 10 % and sodium chloride 0 9 % 100 mL/hr Intravenous Continuous Jeniffer Garcia MD Last Rate: 100 mL/hr (01/02/20 0140)   vasopressin (PITRESSIN) in 0 9 % sodium chloride 100 mL 0 04 Units/min Intravenous Continuous Josesito Garcia MD Last Rate: 0 04 Units/min (01/02/20 0728)     Continuous Infusions:    dexmedetomidine 0 1-0 7 mcg/kg/hr Last Rate: 0 7 mcg/kg/hr (01/02/20 0436)   epinephrine 1-10 mcg/min Last Rate: 2 mcg/min (01/02/20 0617)   fentaNYL 100 mcg/hr Last Rate: 100 mcg/hr (01/01/20 2336)   ketamine 0 2 mg/kg/hr Last Rate: 0 2 mg/kg/hr (01/02/20 0452)   IV infusion builder 56 3 mL/hr Last Rate: 56 3 mL/hr (01/02/20 0416)   NxStage K 2/Ca 3 20,000 mL    phenylephine  mcg/min Last Rate: 180 mcg/min (01/02/20 0544)   dextrose 10 % and sodium chloride 0 9 % 100 mL/hr Last Rate: 100 mL/hr (01/02/20 0140)   vasopressin (PITRESSIN) in 0 9 % sodium chloride 100 mL 0 04 Units/min Last Rate: 0 04 Units/min (01/02/20 0728)     PRN Meds:     fentanyl citrate (PF) 50 mcg Q1H PRN   Ketamine HCl 50 mg Q2H PRN       Allergies   No Known Allergies    Advance Directive and Living Will:      Power of :    POLST:      Counseling / Coordination of Care  37 mins of critical care time, excludes procedures and time spent with family    SHANA Guadalupe      Portions of the record may have been created with voice recognition software  Occasional wrong word or "sound a like" substitutions may have occurred due to the inherent limitations of voice recognition software    Read the chart carefully and recognize, using context, where substitutions have occurred

## 2020-01-02 NOTE — SOCIAL WORK
LSW provided emotional support to patient's mother, father, daughter, aunt, and niece  Family is appropriately grieving over the patient's most recent decline  Counseling / Coordination of Care  Total floor / unit time spent today 60 minutes  Greater than 50% of total time was spent with the patient and / or family counseling and / or coordination of care   A description of the counseling / coordination of care: support

## 2020-01-02 NOTE — PROGRESS NOTES
Procedure Note - Nephrology   Myla Davis 34 y o  male MRN: [id number]  Unit/Bed#: MICU 06 Encounter: [id number]    Procedure note-CRRT(20314)  Assessment / Plan:  1  Severe acute kidney injury, present on admission, baseline serum creatinine 0 9 and December 19, 2019  -serum creatinine 5 on admission  -Flavio  likely multifactorial in the setting of prerenal/ATN/hepatorenal syndrome versus questionable TMA in the setting of VEGF inhibitor versus recent use of Nivolumab and Abemaciclib from October 16th to December 4, 2019  Nivolumab can cause immune mediated nephritis   Could also potentially have Coumadin nephropathy vs TLS  -continue Heart to monitor I/O  -patient remains in lactic acidosis with only slight improvement this morning   -patient seen examined by me on CVVHD today at 10:15am   On even UF, 2K bath   CVVHD was started on December 29, 2019  Will continue current prescription   Would consider change in potassium bath if potassium in serum < 4  -consent on file which mother has provided  -urinalysis with microscopy shows significant interference from bilirubin with occasional bacteria, 2 to 4 RBCs, 2-4 WBCs  -still anuric     2  Lactic acidosis in setting of hypotension, liver dysfunction, malignancy - last lactate 6 7, will avoid UF for now, likely d/t underlying HCC/hepatoblastoma and liver failure       3  Severe life-threatening hyperkalemia-potassium is significantly improved on CRRT   Will continue 2 potassium bath for now as above   Initial wide complex tachycardia overall improved with calcium gluconate, medical management     4  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 - hemolysis smear showed positive schistocytes 12/29/19, Hgb 7 8, transfuse prn Hgb < 7  Of note, platelets normal range      5  Severe shock-on multiple pressors, monitoring per ICU     6   Altered mental status in the setting of concern for hepatic encephalopathy versus sepsis-monitor on dialysis, management per primary team  Ammonia level stable in 50s       7  Elevated anion gap metabolic acidosis - AG normal, bicarb 20, continue increased rate of dialysate at 3000ml/hr to correct this  May be in part driven by lactic acidosis       8  Elevated LFTs - improving slowly but bilirubin very high  Poor prognosis  Now level 3 DNR/DNI        Subjective:   Patient seen and examined by me on CRRT at approximately 10:15 a m  Jose [place] Patient on 2 K bath, even UF  Blood pressure 134/60 on multiple pressors  Unable to elicit HPI or review of systems due to altered mental status  Objective:     Vitals: Blood pressure (!) 72/43, pulse 88, temperature (!) 96 8 °F (36 °C), resp  rate (!) 5, height 6' 6" (1 981 m), weight 95 4 kg (210 lb 5 1 oz), SpO2 99 %  ,Body mass index is 24 3 kg/m²  Temp (24hrs), Av 4 °F (36 3 °C), Min:96 4 °F (35 8 °C), Max:99 °F (37 2 °C)      Weight (last 2 days)     Date/Time   Weight    20 0547   95 4 (210 32)    20 0530   95 8 (211 2)    19 0843   94 3 (207 89)    19 0557   94 3 (207 89)                Intake/Output Summary (Last 24 hours) at 2020 1146  Last data filed at 2020 1100  Gross per 24 hour   Intake 7927 46 ml   Output 7738 ml   Net 189 46 ml     I/O last 24 hours: In: 9354 5 [P O :12; I V :6776 8; IV Piggyback:2565 7]  Out: 9100 [Urine:15; Other:9085]        Physical Exam:   Physical Exam   Constitutional: No distress  Thin, jaundiced, ill appearing, cachectic   HENT:   Head: Normocephalic and atraumatic  Mouth/Throat: No oropharyngeal exudate  Eyes: Right eye exhibits no discharge  Left eye exhibits no discharge  Scleral icterus is present  Neck: Neck supple  No thyromegaly present  Cardiovascular: Normal rate, regular rhythm and normal heart sounds  Pulmonary/Chest: Effort normal and breath sounds normal  He has no wheezes  He has no rales  Abdominal: Soft  Bowel sounds are normal  He exhibits no distension  There is no tenderness     Genitourinary:   Genitourinary Comments: +Heart   Musculoskeletal: Normal range of motion  He exhibits edema (b/l LE pitting)  Neurological:   Lethargic, nonverbal   Skin: Skin is warm and dry  No rash noted  He is not diaphoretic  Psychiatric:   Unable to elicit due to AMS   Vitals reviewed        Invasive Devices     Central Venous Catheter Line            CVC Central Lines 01/01/20 Triple 20cm less than 1 day          Peripheral Intravenous Line            Peripheral IV 12/31/19 Right;Upper Arm 1 day          Arterial Line            Arterial Line 12/29/19 Radial 4 days          Hemodialysis Catheter            HD Temporary Double Catheter 4 days          Drain            Urethral Catheter Temperature probe 4 days                Medications:    Scheduled Meds:  Current Facility-Administered Medications:  acetylcysteine 100 mg/kg Intravenous Continuous SHANA Walker Last Rate: 9,540 mg (01/02/20 1018)   ampicillin 2,000 mg Intravenous Q6H Yvette Flores MD Last Rate: Stopped (01/02/20 1000)   artificial tear  Both Eyes BID Deven Veronica PA-C    dexmedetomidine 0 1-0 7 mcg/kg/hr Intravenous Titrated Duyen Garcia MD Last Rate: 0 7 mcg/kg/hr (01/02/20 1041)   epinephrine 1-10 mcg/min Intravenous Titrated Duyen Garcia MD Last Rate: Stopped (01/02/20 3631)   fentaNYL 100 mcg/hr Intravenous Continuous Duyen Garcia MD Last Rate: Stopped (01/02/20 0956)   fentanyl citrate (PF) 50 mcg Intravenous Q1H PRN Duyen Garcia MD    hydrocortisone sodium succinate 50 mg Intravenous Q6H Aamir Veronica PA-C    ketamine 0 2 mg/kg/hr Intravenous Continuous Josesito Garcia MD Last Rate: 0 2 mg/kg/hr (01/02/20 0452)   Ketamine HCl 50 mg Intravenous Q2H PRN Doug Hanson MD    metroNIDAZOLE 500 mg Intravenous BID Doug Hanson MD Last Rate: 500 mg (01/02/20 0809)   norepinephrine 1-30 mcg/min Intravenous Titrated Ant Be DO Last Rate: 24 mcg/min (01/02/20 1051)   NxStage K 2/Ca 3 20,000 mL Dialysis Continuous Tiffany K Rubi DO    pantoprazole 40 mg Intravenous Q24H [address] Aamir Veronica PA-C    phenylephine  mcg/min Intravenous Titrated Barbie Cola, DO Last Rate: 180 mcg/min (01/02/20 1027)   dextrose 10 % and sodium chloride 0 9 % 100 mL/hr Intravenous Continuous Erma Garcia MD Last Rate: 100 mL/hr (01/02/20 0140)   vasopressin (PITRESSIN) in 0 9 % sodium chloride 100 mL 0 04 Units/min Intravenous Continuous Josesito Garcia MD Last Rate: 0 04 Units/min (01/02/20 0728)       PRN Meds: fentanyl citrate (PF)    Ketamine HCl    Continuous Infusions:  acetylcysteine 100 mg/kg Last Rate: 9,540 mg (01/02/20 1018)   dexmedetomidine 0 1-0 7 mcg/kg/hr Last Rate: 0 7 mcg/kg/hr (01/02/20 1041)   epinephrine 1-10 mcg/min Last Rate: Stopped (01/02/20 0927)   fentaNYL 100 mcg/hr Last Rate: Stopped (01/02/20 0956)   ketamine 0 2 mg/kg/hr Last Rate: 0 2 mg/kg/hr (01/02/20 0452)   norepinephrine 1-30 mcg/min Last Rate: 24 mcg/min (01/02/20 1051)   NxStage K 2/Ca 3 20,000 mL    phenylephine  mcg/min Last Rate: 180 mcg/min (01/02/20 1027)   dextrose 10 % and sodium chloride 0 9 % 100 mL/hr Last Rate: 100 mL/hr (01/02/20 0140)   vasopressin (PITRESSIN) in 0 9 % sodium chloride 100 mL 0 04 Units/min Last Rate: 0 04 Units/min (01/02/20 0728)           LAB RESULTS:      Results from last 7 days   Lab Units 01/02/20  0609 01/02/20  0428 01/02/20  0408 01/01/20  2337 01/01/20  1811 01/01/20  1809 01/01/20  1210 01/01/20  0552 01/01/20  0005 12/31/19  1738  12/31/19  0436  12/30/19  0532  12/29/19  1802  12/29/19  1005 12/29/19  0608 12/29/19  0422 12/29/19 0222 12/28/19  2330 12/28/19 2145 12/28/19 2144   WBC Thousand/uL  --  80 22*  --   --   --   --   --  75 44*  --   --   --  54 54*  --  45 98*  --   --   --   --  46 36*  --   --   --   --   --   --  43 88*   HEMOGLOBIN g/dL  --  7 8*  --   --   --   --   --  7 9*  --   --   --  8 1*  --  8 1*  --   --   --  8 6* 8 6*  --   --   --   --   --   --  10 2*   I STAT HEMOGLOBIN g/dl  --   --  8 8*  --   --   --   -- --   --   --   --   --   --   --   --   --   --   --   --   --   --   --  9 2* 11 9*   < >  --    HEMATOCRIT %  --  23 2*  --   --   --   --   --  23 7*  --   --   --  23 8*  --  23 5*  --   --   --  25 2* 26 2*  --   --   --   --   --   --  32 6*   HEMATOCRIT, ISTAT %  --   --  26*  --   --   --   --   --   --   --   --   --   --   --   --   --   --   --   --   --   --   --  27* 35*   < >  --    PLATELETS Thousands/uL  --  182  --   --   --   --   --  335  --   --   --  466*  --  567*  --   --   --   --  667* 656*  --   --   --   --   --  811*   LYMPHO PCT %  --  4*  --   --   --   --   --  1*  --   --   --  10*  --  2*  --   --   --   --  9*  --   --   --   --   --   --  6*   MONO PCT %  --  1*  --   --   --   --   --  8  --   --   --  6  --  3*  --   --   --   --  9  --   --   --   --   --   --  7   EOS PCT %  --  0  --   --   --   --   --  0  --   --   --  1  --  0  --   --   --   --  0  --   --   --   --   --   --  0   POTASSIUM mmol/L 4 8  --   --  5 1 4 7  --  4 9 4 8 4 8 4 4   < > 3 9   < > 4 1  4 1   < > 4 6   < >  --  6 2*  --  6 7*   < >  --   --   --   --    CHLORIDE mmol/L 106  --   --  106 106  --  107 107 107 110*   < > 109*   < > 105  105   < > 102   < >  --  97*  --  94*   < >  --   --   --   --    CO2 mmol/L 20*  --   --  21 20*  --  20* 17* 17* 19*   < > 20*   < > 18*  17*   < > 15*   < >  --  12*  --  12*   < >  --   --   --   --    CO2, I-STAT mmol/L  --   --  23  --   --   --   --   --   --   --   --   --   --   --   --   --   --   --   --   --   --   --  12* 12*   < >  --    BUN mg/dL 17  --   --  16 16  --  16 16 18 19   < > 19   < > 24  25   < > 32*   < >  --  49*  --  53*   < >  --   --   --   --    CREATININE mg/dL 2 48*  --   --  2 44* 2 15*  --  2 25* 2 44* 2 45* 2 39*   < > 2 71*   < > 2 98*  2 98*   < > 3 24*   < >  --  4 07*  --  4 56*   < >  --   --   --   --    CALCIUM mg/dL 8 5  --   --  8 6 8 6  --  8 9 9 1 8 1* 8 4   < > 8 6   < > 8 4  8 4   < > 7 7*   < >  --  6 9*  -- 6 7*   < >  --   --   --   --    ALK PHOS U/L  --  1,038*  --   --   --   --   --  1,061*  --   --   --  1,151*  --  1,205*  --  1,411*  --   --   --   --  1,521*  --   --   --   --   --    ALT U/L  --  980*  --   --   --   --   --  1,274*  --   --   --  1,614*  --  2,005*  --  2,246*  --   --   --   --  2,338*  --   --   --   --   --    AST U/L  --  2,423*  --   --   --   --   --  3,477*  --   --   --  5,445*  --  9,602*  --  11,037*  --   --   --   --  13,166*  --   --   --   --   --    GLUCOSE, ISTAT mg/dl  --   --  125  --   --   --   --   --   --   --   --   --   --   --   --   --   --   --   --   --   --   --  95 40*  --   --    MAGNESIUM mg/dL 2 0  --   --  2 0  --  2 0 2 0 2 2 1 8 2 0   < >  --    < > 2 0   < > 2 0   < >  --   --   --  2 5  --   --   --   --   --    PHOSPHORUS mg/dL 4 2  --   --  3 7  --  3 9 3 9 3 8 3 5 3 3   < >  --    < > 4 9*   < > 6 5*   < >  --   --   --  12 6*  --   --   --   --  17 3*    < > = values in this interval not displayed  CUTURES:  Lab Results   Component Value Date    BLOODCX Received in Microbiology Lab  Culture in Progress  01/01/2020    BLOODCX Received in Microbiology Lab  Culture in Progress  01/01/2020    BLOODCX Enterococcus faecalis (A) 12/30/2019    BLOODCX Alpha Hemolytic Streptococcus (A) 12/30/2019    BLOODCX Enterococcus faecalis (A) 12/28/2019    BLOODCX Enterococcus faecalis (A) 12/28/2019                 Portions of the record may have been created with voice recognition software  Occasional wrong word or "sound a like" substitutions may have occurred due to the inherent limitations of voice recognition software  Read the chart carefully and recognize, using context, where substitutions have occurred  If you have any questions, please contact the dictating provider

## 2020-01-02 NOTE — QUICK NOTE
Called to evaluate the patient secondary to hypoxia, hypotension  Upon evaluation, patient was noted to be hypoxic with saturations in the 70s and hypotensive with maps in the 30s to 40s  He was subsequently placed a non-rebreather and his epinephrine was increased  Following this, patient was saturating % and was again transitioned back to nasal cannula  His blood pressure also improved and remained greater than 60  An i-STAT ABG was performed which demonstrated a pH of 7 31, pCO2 42 5, and PO2 of 92  Patient appears to have returned to his current baseline, will continue to monitor closely

## 2020-01-03 NOTE — DEATH NOTE
INPATIENT DEATH NOTE  Zaida Davis 34 y o  male MRN: [id number]  [facility]: [id number]           Called to bedside by RN  Patient is identified visually and identification confirmed with identification bracelet  Patient unresponsive to stimuli  No spontaneous respirations  No palpable pulse or audible heart sounds  Pupils fixed bilaterally  Asystolic on two contiguous leads  Time of death: 200    Family present at bedside  Attending notified  [place] office called by RN       Primary Service Attending Physician notified?:  yes - Other: Dr Flores Borne

## 2020-01-03 NOTE — PROGRESS NOTES
Family requested compassionate withdrawal from care  Resident made aware and will put comfort care orders in  Ketamine, precedex, and fentanyl infusions contd  All other drips d/c  Will continue with comfort measures

## 2020-01-03 NOTE — DISCHARGE SUMMARY
Discharge Summary - Whit Davis 34 y o  male MRN: [id number]    Unit/Bed#: MICU 06 Encounter: [id number] PCP: Jayce Jeffery MD    Admission Date:   Admission Orders (From admission, onward)     Ordered        12/29/19 0202  Inpatient Admission  Once                     Admitting Diagnosis: Multiple organ failure with liver failure ([facility] [address] ) [[id number]]    HPI: Per H&P "Stevie Harada is a 34 y o  male who presents as a transfer from [facility]  Patient initially presented to [facility] on 12/28 with chief complaint of altered mental status  Per the patient's mother, he was brought in secondary to worsening confusion and the fact that he did not have any urine output over the prior 12 hours  The patient has hepatocellular carcinoma initially diagnosed in 2013  He has since been on chemotherapy and most recently has been on Ramucirumab       When he arrived to the emergency department, he was found to have severe renal failure, acidosis, hyperkalemia, and anuria  After 4 L of IV fluids, patient still remained anuric  Nephrology was consulted and the decision was made to transfer the patient to [facility] for CVVHD  Prior to transfer, a CT head, chest, abdomen, and pelvis were performed  CT head was negative for any acute intracranial abnormality  CT chest abdomen and pelvis demonstrated a diffusely enlarged liver with a numeral focal low density lesions seen throughout  There are multiple moderately dilated loops of small bowel, with some relative underdistention of the distal small bowel and the colon suspicious for small-bowel obstruction      When the patient arrived to [facility], he was noted to be confused, but redirectable    A temporary HD catheter was placed in the right IJ and see CVVHD was initiated  "    Procedures Performed:   Orders Placed This Encounter   Procedures    Central Line    Temporary HD Catheter       Summary of Hospital Course:     Susan was admitted to the ICU in a state of profound shock  He required 4 pressors for blood pressure support and  was initiated on the [employer] protocol with solu-cortef, vitamin C and thiamine as well as continuous dialysis for acute renal failure  Blood cultures the following day grew Enterococcus, the likely source was intraabdominal from a small bowel obstruction and general surgery was consulted  They recommended NPO status and no surgical intervention at that time  His continued in a profound shock state and developed worsening acute on chronic liver failure and recurrent positive blood cultures  [facility] at [facility] was contacted and recommended no further escalation of care nor pulse dose steroids given continued bacteremia  Ultimately family decided to make patient comfort care status and patient  with family at bedside  Significant Findings, Care, Treatment and Services Provided:      CT Head: Negative acute findings   Blood cultures grew E Faecalis   CT Abdomen and Pelvis: diffusely enlarged liver w/ innumerable hepatic lesions, moderately dilated loops small bowel and underdistension of distal small bowel- concern for SBO, partially distended bladder  : Blood cultures grew E Faecalis   Hepatic Duplex Enlarged liver containing numerous masses in keeping with provided history of [address]  Difficult Doppler evaluation due to marked liver abnormality  The portal and hepatic veins are not identified and could be occluded or severely extrinsically compressed by liver masses  There is reversal of flow within the patent splenic and superior   mesenteric veins  The hepatic artery is patent with normal arterial waveform on spectral Doppler imaging   TTE: EF 65% no RWM, echogenic density in the RA, may correlate with central catheter   No vegetations   : L Fem TLC   Blood cultures- no growth at 24 hours    Disposition:      Final Diagnosis: Principal Problem:    Liver failure [facility])  Active Problems:    Shock ([facility] [address] )    Hepatoblastoma ([facility] [address] )    Hypoglycemia    Small bowel obstruction ([facility] [address] )    Hypothermia    Transaminitis    Hepatic encephalopathy (HCC)    Elevated troponin    AZALIA (acute kidney injury) ([facility] )    Metabolic acidosis    Lactic acid acidosis    Elevated INR    Leukocytosis    Anuria    Anemia        Resolved Problems  Date Reviewed: 1/2/2020          Resolved    Hyperkalemia 12/31/2019     Resolved by  SHANA Ruelas    Hyponatremia 12/30/2019     Resolved by  Sandro Miller DO    Hyperuricemia 12/31/2019     Resolved by  Sandro Miller DO          Condition at Time of Death: Unstable    Date, Time and Cause of Death    Date of Death:  1/3/20  Time of Death:   8:12 AM  Preliminary Cause of Death:  Liver failure ([facility] )  Entered by:  Viji Pruett PA-C[EB1 1]     Attribution     EB1 1 Jose Clay PA-C 01/03/20 13:05

## 2020-01-06 ENCOUNTER — TRANSITIONAL CARE MANAGEMENT (OUTPATIENT)
Dept: INTERNAL MEDICINE CLINIC | Facility: CLINIC | Age: 30
End: 2020-01-06

## 2020-01-06 LAB
BACTERIA BLD CULT: ABNORMAL
BACTERIA BLD CULT: NORMAL
BACTERIA BLD CULT: NORMAL
GRAM STN SPEC: ABNORMAL

## 2020-01-13 NOTE — UTILIZATION REVIEW
Notification of Discharge  This is a Notification of Discharge from our facility [facility]  Please be advised that this patient has been discharge from our facility  Below you will find the admission and discharge date and time including the patients disposition  PRESENTATION DATE: 2019 12:39 AM  OBS ADMISSION DATE:   IP ADMISSION DATE: 19   DISCHARGE DATE: 1/3/2020  1:46 PM  DISPOSITION:     Admission Orders listed below:  Admission Orders (From admission, onward)     Ordered        19  Inpatient Admission  Once                   Please contact the UR Department if additional information is required to close this patient's authorization/case  [address] Utilization Review Department  Main: [phone] x carefully listen to the prompts  All voicemails are confidential   [email or web address]  Send all requests for admission clinical reviews, approved or denied determinations and any other requests to dedicated fax number below belonging to the campus where the patient is receiving treatment   List of dedicated fax numbers:  [address] DENIALS (Administrative/Medical Necessity) [phone]   [address] (Maternity/NICU/Pediatrics) [phone] [address] [facility] [phone]   [facility] [phone]   [facility] Valera [phone]   [address] [phone]   [facility]  [phone]   [address] [facility]  [address] [address] [address] [facility] [phone]